# Patient Record
Sex: FEMALE | Race: WHITE | NOT HISPANIC OR LATINO | Employment: OTHER | URBAN - NONMETROPOLITAN AREA
[De-identification: names, ages, dates, MRNs, and addresses within clinical notes are randomized per-mention and may not be internally consistent; named-entity substitution may affect disease eponyms.]

---

## 2018-07-03 ENCOUNTER — HISTORICAL (OUTPATIENT)
Dept: ADMINISTRATIVE | Facility: HOSPITAL | Age: 73
End: 2018-07-03

## 2019-11-13 ENCOUNTER — HISTORICAL (OUTPATIENT)
Dept: ADMINISTRATIVE | Facility: HOSPITAL | Age: 74
End: 2019-11-13

## 2021-11-15 ENCOUNTER — HISTORICAL (OUTPATIENT)
Dept: ADMINISTRATIVE | Facility: HOSPITAL | Age: 76
End: 2021-11-15

## 2022-04-10 ENCOUNTER — HISTORICAL (OUTPATIENT)
Dept: ADMINISTRATIVE | Facility: HOSPITAL | Age: 77
End: 2022-04-10

## 2022-04-29 VITALS
SYSTOLIC BLOOD PRESSURE: 128 MMHG | BODY MASS INDEX: 21.56 KG/M2 | WEIGHT: 129.44 LBS | HEIGHT: 65 IN | DIASTOLIC BLOOD PRESSURE: 60 MMHG

## 2023-03-24 ENCOUNTER — HOSPITAL ENCOUNTER (OUTPATIENT)
Dept: RADIOLOGY | Facility: HOSPITAL | Age: 78
Discharge: HOME | End: 2023-03-24
Attending: FAMILY MEDICINE
Payer: MEDICARE

## 2023-03-24 DIAGNOSIS — M54.16 LUMBAR RADICULOPATHY: ICD-10-CM

## 2023-03-24 PROCEDURE — 72148 MRI LUMBAR SPINE W/O DYE: CPT | Mod: TC

## 2023-06-04 ENCOUNTER — HOSPITAL ENCOUNTER (INPATIENT)
Facility: HOSPITAL | Age: 78
LOS: 3 days | Discharge: NURSING, REHAB OR LONG-TERM CARE | DRG: 481 | End: 2023-06-08
Attending: STUDENT IN AN ORGANIZED HEALTH CARE EDUCATION/TRAINING PROGRAM | Admitting: INTERNAL MEDICINE
Payer: MEDICARE

## 2023-06-04 DIAGNOSIS — Z01.818 PRE-OP EXAM: ICD-10-CM

## 2023-06-04 DIAGNOSIS — S72.142A: Primary | ICD-10-CM
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
## 2023-06-04 DIAGNOSIS — Z01.818 PRE-OP EVALUATION: ICD-10-CM

## 2023-06-04 DIAGNOSIS — S72.8X2A OTHER FRACTURE OF LEFT FEMUR, INITIAL ENCOUNTER FOR CLOSED FRACTURE: ICD-10-CM

## 2023-06-04 PROCEDURE — 96374 THER/PROPH/DIAG INJ IV PUSH: CPT

## 2023-06-04 PROCEDURE — 99285 EMERGENCY DEPT VISIT HI MDM: CPT

## 2023-06-04 PROCEDURE — 63600175 PHARM REV CODE 636 W HCPCS: Performed by: NURSE PRACTITIONER

## 2023-06-04 PROCEDURE — 96375 TX/PRO/DX INJ NEW DRUG ADDON: CPT

## 2023-06-04 RX ORDER — HYDROMORPHONE HYDROCHLORIDE 2 MG/ML
1 INJECTION, SOLUTION INTRAMUSCULAR; INTRAVENOUS; SUBCUTANEOUS
Status: COMPLETED | OUTPATIENT
Start: 2023-06-04 | End: 2023-06-04

## 2023-06-04 RX ORDER — ONDANSETRON 2 MG/ML
4 INJECTION INTRAMUSCULAR; INTRAVENOUS
Status: COMPLETED | OUTPATIENT
Start: 2023-06-04 | End: 2023-06-04

## 2023-06-04 RX ADMIN — ONDANSETRON 4 MG: 2 INJECTION INTRAMUSCULAR; INTRAVENOUS at 11:06

## 2023-06-04 RX ADMIN — HYDROMORPHONE HYDROCHLORIDE 1 MG: 2 INJECTION INTRAMUSCULAR; INTRAVENOUS; SUBCUTANEOUS at 11:06

## 2023-06-05 ENCOUNTER — ANESTHESIA (OUTPATIENT)
Dept: SURGERY | Facility: HOSPITAL | Age: 78
DRG: 481 | End: 2023-06-05
Payer: MEDICARE

## 2023-06-05 ENCOUNTER — ANESTHESIA EVENT (OUTPATIENT)
Dept: SURGERY | Facility: HOSPITAL | Age: 78
DRG: 481 | End: 2023-06-05
Payer: MEDICARE

## 2023-06-05 PROBLEM — S72.8X2A OTHER FRACTURE OF LEFT FEMUR, INITIAL ENCOUNTER FOR CLOSED FRACTURE: Status: ACTIVE | Noted: 2023-06-05

## 2023-06-05 LAB
ABORH RETYPE: NORMAL
ALBUMIN SERPL-MCNC: 3.3 G/DL (ref 3.4–4.8)
ALBUMIN/GLOB SERPL: 1.4 RATIO (ref 1.1–2)
ALP SERPL-CCNC: 64 UNIT/L (ref 40–150)
ALT SERPL-CCNC: 16 UNIT/L (ref 0–55)
APTT PPP: 27.5 SECONDS (ref 23.2–33.7)
AST SERPL-CCNC: 12 UNIT/L (ref 5–34)
BASOPHILS # BLD AUTO: 0.04 X10(3)/MCL
BASOPHILS NFR BLD AUTO: 0.3 %
BILIRUBIN DIRECT+TOT PNL SERPL-MCNC: 0.3 MG/DL
BUN SERPL-MCNC: 18.3 MG/DL (ref 9.8–20.1)
CALCIUM SERPL-MCNC: 8.6 MG/DL (ref 8.4–10.2)
CHLORIDE SERPL-SCNC: 100 MMOL/L (ref 98–107)
CO2 SERPL-SCNC: 24 MMOL/L (ref 23–31)
CREAT SERPL-MCNC: 1.02 MG/DL (ref 0.55–1.02)
EOSINOPHIL # BLD AUTO: 0.1 X10(3)/MCL (ref 0–0.9)
EOSINOPHIL NFR BLD AUTO: 0.7 %
ERYTHROCYTE [DISTWIDTH] IN BLOOD BY AUTOMATED COUNT: 12.4 % (ref 11.5–17)
GFR SERPLBLD CREATININE-BSD FMLA CKD-EPI: 56 MLS/MIN/1.73/M2
GLOBULIN SER-MCNC: 2.4 GM/DL (ref 2.4–3.5)
GLUCOSE SERPL-MCNC: 240 MG/DL (ref 82–115)
GROUP & RH: NORMAL
HCT VFR BLD AUTO: 36.1 % (ref 37–47)
HGB BLD-MCNC: 12.3 G/DL (ref 12–16)
IMM GRANULOCYTES # BLD AUTO: 0.08 X10(3)/MCL (ref 0–0.04)
IMM GRANULOCYTES NFR BLD AUTO: 0.6 %
INDIRECT COOMBS GEL: NORMAL
INR BLD: 0.92 (ref 0–1.3)
LYMPHOCYTES # BLD AUTO: 2.5 X10(3)/MCL (ref 0.6–4.6)
LYMPHOCYTES NFR BLD AUTO: 18.4 %
MCH RBC QN AUTO: 31 PG (ref 27–31)
MCHC RBC AUTO-ENTMCNC: 34.1 G/DL (ref 33–36)
MCV RBC AUTO: 90.9 FL (ref 80–94)
MONOCYTES # BLD AUTO: 0.96 X10(3)/MCL (ref 0.1–1.3)
MONOCYTES NFR BLD AUTO: 7.1 %
NEUTROPHILS # BLD AUTO: 9.93 X10(3)/MCL (ref 2.1–9.2)
NEUTROPHILS NFR BLD AUTO: 72.9 %
NRBC BLD AUTO-RTO: 0 %
PLATELET # BLD AUTO: 270 X10(3)/MCL (ref 130–400)
PMV BLD AUTO: 8.4 FL (ref 7.4–10.4)
POCT GLUCOSE: 129 MG/DL (ref 70–110)
POCT GLUCOSE: 196 MG/DL (ref 70–110)
POCT GLUCOSE: 314 MG/DL (ref 70–110)
POTASSIUM SERPL-SCNC: 4.2 MMOL/L (ref 3.5–5.1)
PROT SERPL-MCNC: 5.7 GM/DL (ref 5.8–7.6)
PROTHROMBIN TIME: 12.3 SECONDS (ref 12.5–14.5)
RBC # BLD AUTO: 3.97 X10(6)/MCL (ref 4.2–5.4)
SODIUM SERPL-SCNC: 135 MMOL/L (ref 136–145)
SPECIMEN OUTDATE: NORMAL
WBC # SPEC AUTO: 13.61 X10(3)/MCL (ref 4.5–11.5)

## 2023-06-05 PROCEDURE — D9220A PRA ANESTHESIA: ICD-10-PCS | Mod: ANES,,, | Performed by: ANESTHESIOLOGY

## 2023-06-05 PROCEDURE — 80053 COMPREHEN METABOLIC PANEL: CPT | Performed by: NURSE PRACTITIONER

## 2023-06-05 PROCEDURE — 27245 PR OPEN FIX INTER/SUBTROCH FX,IMPLNT: ICD-10-PCS | Mod: LT,,, | Performed by: ORTHOPAEDIC SURGERY

## 2023-06-05 PROCEDURE — 25000003 PHARM REV CODE 250: Performed by: PHYSICIAN ASSISTANT

## 2023-06-05 PROCEDURE — 36000710: Performed by: ORTHOPAEDIC SURGERY

## 2023-06-05 PROCEDURE — 11000001 HC ACUTE MED/SURG PRIVATE ROOM

## 2023-06-05 PROCEDURE — D9220A PRA ANESTHESIA: ICD-10-PCS | Mod: CRNA,,, | Performed by: NURSE ANESTHETIST, CERTIFIED REGISTERED

## 2023-06-05 PROCEDURE — 85025 COMPLETE CBC W/AUTO DIFF WBC: CPT | Performed by: NURSE PRACTITIONER

## 2023-06-05 PROCEDURE — 25000003 PHARM REV CODE 250: Performed by: NURSE ANESTHETIST, CERTIFIED REGISTERED

## 2023-06-05 PROCEDURE — 27245 TREAT THIGH FRACTURE: CPT | Mod: AS,LT,, | Performed by: PHYSICIAN ASSISTANT

## 2023-06-05 PROCEDURE — 93005 ELECTROCARDIOGRAM TRACING: CPT

## 2023-06-05 PROCEDURE — D9220A PRA ANESTHESIA: Mod: CRNA,,, | Performed by: NURSE ANESTHETIST, CERTIFIED REGISTERED

## 2023-06-05 PROCEDURE — 63600175 PHARM REV CODE 636 W HCPCS: Performed by: INTERNAL MEDICINE

## 2023-06-05 PROCEDURE — 27201423 OPTIME MED/SURG SUP & DEVICES STERILE SUPPLY: Performed by: ORTHOPAEDIC SURGERY

## 2023-06-05 PROCEDURE — 85610 PROTHROMBIN TIME: CPT | Performed by: NURSE PRACTITIONER

## 2023-06-05 PROCEDURE — C1713 ANCHOR/SCREW BN/BN,TIS/BN: HCPCS | Performed by: ORTHOPAEDIC SURGERY

## 2023-06-05 PROCEDURE — 36000711: Performed by: ORTHOPAEDIC SURGERY

## 2023-06-05 PROCEDURE — 63600175 PHARM REV CODE 636 W HCPCS: Performed by: PHYSICIAN ASSISTANT

## 2023-06-05 PROCEDURE — 85730 THROMBOPLASTIN TIME PARTIAL: CPT | Performed by: NURSE PRACTITIONER

## 2023-06-05 PROCEDURE — 63600175 PHARM REV CODE 636 W HCPCS: Performed by: NURSE ANESTHETIST, CERTIFIED REGISTERED

## 2023-06-05 PROCEDURE — 27245 TREAT THIGH FRACTURE: CPT | Mod: LT,,, | Performed by: ORTHOPAEDIC SURGERY

## 2023-06-05 PROCEDURE — 63600175 PHARM REV CODE 636 W HCPCS: Performed by: NURSE PRACTITIONER

## 2023-06-05 PROCEDURE — 63600175 PHARM REV CODE 636 W HCPCS: Performed by: ANESTHESIOLOGY

## 2023-06-05 PROCEDURE — 71000033 HC RECOVERY, INTIAL HOUR: Performed by: ORTHOPAEDIC SURGERY

## 2023-06-05 PROCEDURE — 63600175 PHARM REV CODE 636 W HCPCS

## 2023-06-05 PROCEDURE — 27000221 HC OXYGEN, UP TO 24 HOURS

## 2023-06-05 PROCEDURE — 93010 EKG 12-LEAD: ICD-10-PCS | Mod: ,,, | Performed by: INTERNAL MEDICINE

## 2023-06-05 PROCEDURE — D9220A PRA ANESTHESIA: Mod: ANES,,, | Performed by: ANESTHESIOLOGY

## 2023-06-05 PROCEDURE — 37000009 HC ANESTHESIA EA ADD 15 MINS: Performed by: ORTHOPAEDIC SURGERY

## 2023-06-05 PROCEDURE — 86923 COMPATIBILITY TEST ELECTRIC: CPT | Performed by: INTERNAL MEDICINE

## 2023-06-05 PROCEDURE — 27245 PR OPEN FIX INTER/SUBTROCH FX,IMPLNT: ICD-10-PCS | Mod: AS,LT,, | Performed by: PHYSICIAN ASSISTANT

## 2023-06-05 PROCEDURE — 25000003 PHARM REV CODE 250: Performed by: INTERNAL MEDICINE

## 2023-06-05 PROCEDURE — 86900 BLOOD TYPING SEROLOGIC ABO: CPT | Performed by: ORTHOPAEDIC SURGERY

## 2023-06-05 PROCEDURE — 99223 PR INITIAL HOSPITAL CARE,LEVL III: ICD-10-PCS | Mod: 57,,, | Performed by: ORTHOPAEDIC SURGERY

## 2023-06-05 PROCEDURE — 25000003 PHARM REV CODE 250

## 2023-06-05 PROCEDURE — 93010 ELECTROCARDIOGRAM REPORT: CPT | Mod: ,,, | Performed by: INTERNAL MEDICINE

## 2023-06-05 PROCEDURE — C1769 GUIDE WIRE: HCPCS | Performed by: ORTHOPAEDIC SURGERY

## 2023-06-05 PROCEDURE — 63600175 PHARM REV CODE 636 W HCPCS: Performed by: ORTHOPAEDIC SURGERY

## 2023-06-05 PROCEDURE — 99223 1ST HOSP IP/OBS HIGH 75: CPT | Mod: 57,,, | Performed by: ORTHOPAEDIC SURGERY

## 2023-06-05 PROCEDURE — 37000008 HC ANESTHESIA 1ST 15 MINUTES: Performed by: ORTHOPAEDIC SURGERY

## 2023-06-05 DEVICE — SCREW LOK XL25 5X44MM: Type: IMPLANTABLE DEVICE | Site: HIP | Status: FUNCTIONAL

## 2023-06-05 DEVICE — SCREW XL25 IM NAIL 40X5MM: Type: IMPLANTABLE DEVICE | Site: HIP | Status: FUNCTIONAL

## 2023-06-05 RX ORDER — PIOGLITAZONEHYDROCHLORIDE 15 MG/1
15 TABLET ORAL NIGHTLY
COMMUNITY
Start: 2023-04-20

## 2023-06-05 RX ORDER — HYDROCHLOROTHIAZIDE 12.5 MG/1
12.5 TABLET ORAL DAILY
Status: DISCONTINUED | OUTPATIENT
Start: 2023-06-05 | End: 2023-06-07

## 2023-06-05 RX ORDER — PHENYLEPHRINE HCL IN 0.9% NACL 1 MG/10 ML
SYRINGE (ML) INTRAVENOUS
Status: DISCONTINUED | OUTPATIENT
Start: 2023-06-05 | End: 2023-06-05

## 2023-06-05 RX ORDER — HYDROMORPHONE HYDROCHLORIDE 2 MG/ML
1 INJECTION, SOLUTION INTRAMUSCULAR; INTRAVENOUS; SUBCUTANEOUS
Status: COMPLETED | OUTPATIENT
Start: 2023-06-05 | End: 2023-06-05

## 2023-06-05 RX ORDER — HYDROMORPHONE HYDROCHLORIDE 2 MG/ML
1 INJECTION, SOLUTION INTRAMUSCULAR; INTRAVENOUS; SUBCUTANEOUS EVERY 4 HOURS PRN
Status: DISCONTINUED | OUTPATIENT
Start: 2023-06-05 | End: 2023-06-05

## 2023-06-05 RX ORDER — ACETAMINOPHEN 325 MG/1
650 TABLET ORAL EVERY 4 HOURS PRN
Status: DISCONTINUED | OUTPATIENT
Start: 2023-06-05 | End: 2023-06-07

## 2023-06-05 RX ORDER — ACETAMINOPHEN 325 MG/1
650 TABLET ORAL EVERY 4 HOURS PRN
Status: DISCONTINUED | OUTPATIENT
Start: 2023-06-05 | End: 2023-06-08 | Stop reason: HOSPADM

## 2023-06-05 RX ORDER — METOPROLOL SUCCINATE 25 MG/1
25 TABLET, EXTENDED RELEASE ORAL DAILY
Status: DISCONTINUED | OUTPATIENT
Start: 2023-06-05 | End: 2023-06-08 | Stop reason: HOSPADM

## 2023-06-05 RX ORDER — FENTANYL CITRATE 50 UG/ML
INJECTION, SOLUTION INTRAMUSCULAR; INTRAVENOUS
Status: DISCONTINUED | OUTPATIENT
Start: 2023-06-05 | End: 2023-06-05

## 2023-06-05 RX ORDER — MAG HYDROX/ALUMINUM HYD/SIMETH 200-200-20
30 SUSPENSION, ORAL (FINAL DOSE FORM) ORAL EVERY 4 HOURS PRN
Status: DISCONTINUED | OUTPATIENT
Start: 2023-06-05 | End: 2023-06-08 | Stop reason: HOSPADM

## 2023-06-05 RX ORDER — DOCUSATE SODIUM 100 MG/1
100 CAPSULE, LIQUID FILLED ORAL DAILY
Status: DISCONTINUED | OUTPATIENT
Start: 2023-06-05 | End: 2023-06-08

## 2023-06-05 RX ORDER — HYDROMORPHONE HYDROCHLORIDE 2 MG/ML
0.2 INJECTION, SOLUTION INTRAMUSCULAR; INTRAVENOUS; SUBCUTANEOUS EVERY 5 MIN PRN
Status: DISCONTINUED | OUTPATIENT
Start: 2023-06-05 | End: 2023-06-05 | Stop reason: HOSPADM

## 2023-06-05 RX ORDER — VANCOMYCIN HYDROCHLORIDE 1 G/20ML
INJECTION, POWDER, LYOPHILIZED, FOR SOLUTION INTRAVENOUS
Status: DISCONTINUED | OUTPATIENT
Start: 2023-06-05 | End: 2023-06-05 | Stop reason: HOSPADM

## 2023-06-05 RX ORDER — LOSARTAN POTASSIUM AND HYDROCHLOROTHIAZIDE 12.5; 1 MG/1; MG/1
1 TABLET ORAL DAILY
Status: DISCONTINUED | OUTPATIENT
Start: 2023-06-05 | End: 2023-06-05

## 2023-06-05 RX ORDER — ISOSORBIDE MONONITRATE 30 MG/1
30 TABLET, EXTENDED RELEASE ORAL DAILY
Status: DISCONTINUED | OUTPATIENT
Start: 2023-06-05 | End: 2023-06-08 | Stop reason: HOSPADM

## 2023-06-05 RX ORDER — AMITRIPTYLINE HYDROCHLORIDE 25 MG/1
25 TABLET, FILM COATED ORAL NIGHTLY
Status: DISCONTINUED | OUTPATIENT
Start: 2023-06-05 | End: 2023-06-08 | Stop reason: HOSPADM

## 2023-06-05 RX ORDER — INSULIN ASPART 100 [IU]/ML
1-10 INJECTION, SOLUTION INTRAVENOUS; SUBCUTANEOUS
Status: DISCONTINUED | OUTPATIENT
Start: 2023-06-05 | End: 2023-06-08 | Stop reason: HOSPADM

## 2023-06-05 RX ORDER — LEVOTHYROXINE SODIUM 88 UG/1
88 TABLET ORAL EVERY MORNING
Status: DISCONTINUED | OUTPATIENT
Start: 2023-06-05 | End: 2023-06-08 | Stop reason: HOSPADM

## 2023-06-05 RX ORDER — GLIMEPIRIDE 2 MG/1
2 TABLET ORAL 2 TIMES DAILY
COMMUNITY
Start: 2023-05-08

## 2023-06-05 RX ORDER — SODIUM CHLORIDE, SODIUM LACTATE, POTASSIUM CHLORIDE, CALCIUM CHLORIDE 600; 310; 30; 20 MG/100ML; MG/100ML; MG/100ML; MG/100ML
INJECTION, SOLUTION INTRAVENOUS CONTINUOUS
Status: DISCONTINUED | OUTPATIENT
Start: 2023-06-05 | End: 2023-06-06

## 2023-06-05 RX ORDER — SODIUM CHLORIDE 0.9 % (FLUSH) 0.9 %
10 SYRINGE (ML) INJECTION
Status: DISCONTINUED | OUTPATIENT
Start: 2023-06-05 | End: 2023-06-05 | Stop reason: HOSPADM

## 2023-06-05 RX ORDER — AMITRIPTYLINE HYDROCHLORIDE 25 MG/1
25 TABLET, FILM COATED ORAL
Status: ON HOLD | COMMUNITY
Start: 2023-05-02 | End: 2023-06-08 | Stop reason: SDUPTHER

## 2023-06-05 RX ORDER — MORPHINE SULFATE 4 MG/ML
2 INJECTION, SOLUTION INTRAMUSCULAR; INTRAVENOUS EVERY 4 HOURS PRN
Status: DISCONTINUED | OUTPATIENT
Start: 2023-06-05 | End: 2023-06-05

## 2023-06-05 RX ORDER — LOSARTAN POTASSIUM AND HYDROCHLOROTHIAZIDE 12.5; 1 MG/1; MG/1
1 TABLET ORAL
COMMUNITY
Start: 2023-06-02

## 2023-06-05 RX ORDER — OXYCODONE HYDROCHLORIDE 5 MG/1
5 TABLET ORAL EVERY 6 HOURS PRN
Status: DISCONTINUED | OUTPATIENT
Start: 2023-06-05 | End: 2023-06-08 | Stop reason: HOSPADM

## 2023-06-05 RX ORDER — HYDROMORPHONE HYDROCHLORIDE 2 MG/ML
INJECTION, SOLUTION INTRAMUSCULAR; INTRAVENOUS; SUBCUTANEOUS
Status: COMPLETED
Start: 2023-06-05 | End: 2023-06-05

## 2023-06-05 RX ORDER — PROPOFOL 10 MG/ML
VIAL (ML) INTRAVENOUS
Status: DISCONTINUED | OUTPATIENT
Start: 2023-06-05 | End: 2023-06-05

## 2023-06-05 RX ORDER — ONDANSETRON HYDROCHLORIDE 2 MG/ML
INJECTION, SOLUTION INTRAMUSCULAR; INTRAVENOUS
Status: DISCONTINUED | OUTPATIENT
Start: 2023-06-05 | End: 2023-06-05

## 2023-06-05 RX ORDER — METHOCARBAMOL 500 MG/1
500 TABLET, FILM COATED ORAL 3 TIMES DAILY
Status: DISCONTINUED | OUTPATIENT
Start: 2023-06-05 | End: 2023-06-08 | Stop reason: HOSPADM

## 2023-06-05 RX ORDER — ISOSORBIDE MONONITRATE 30 MG/1
TABLET, EXTENDED RELEASE ORAL
Status: ON HOLD | COMMUNITY
Start: 2023-04-20 | End: 2023-06-08 | Stop reason: SDUPTHER

## 2023-06-05 RX ORDER — ONDANSETRON 2 MG/ML
4 INJECTION INTRAMUSCULAR; INTRAVENOUS EVERY 4 HOURS PRN
Status: DISCONTINUED | OUTPATIENT
Start: 2023-06-05 | End: 2023-06-08 | Stop reason: HOSPADM

## 2023-06-05 RX ORDER — METFORMIN HYDROCHLORIDE 500 MG/1
1000 TABLET, EXTENDED RELEASE ORAL 2 TIMES DAILY
COMMUNITY
Start: 2023-04-20

## 2023-06-05 RX ORDER — METHOCARBAMOL 100 MG/ML
1000 INJECTION, SOLUTION INTRAMUSCULAR; INTRAVENOUS
Status: COMPLETED | OUTPATIENT
Start: 2023-06-05 | End: 2023-06-05

## 2023-06-05 RX ORDER — ENOXAPARIN SODIUM 100 MG/ML
40 INJECTION SUBCUTANEOUS EVERY 24 HOURS
Status: DISCONTINUED | OUTPATIENT
Start: 2023-06-05 | End: 2023-06-08 | Stop reason: HOSPADM

## 2023-06-05 RX ORDER — SODIUM CHLORIDE 0.9 % (FLUSH) 0.9 %
10 SYRINGE (ML) INJECTION
Status: DISCONTINUED | OUTPATIENT
Start: 2023-06-05 | End: 2023-06-08 | Stop reason: HOSPADM

## 2023-06-05 RX ORDER — LEVOTHYROXINE SODIUM 88 UG/1
88 TABLET ORAL EVERY MORNING
COMMUNITY
Start: 2023-05-06

## 2023-06-05 RX ORDER — LIDOCAINE HYDROCHLORIDE 20 MG/ML
INJECTION, SOLUTION EPIDURAL; INFILTRATION; INTRACAUDAL; PERINEURAL
Status: DISCONTINUED | OUTPATIENT
Start: 2023-06-05 | End: 2023-06-05

## 2023-06-05 RX ORDER — MORPHINE SULFATE 4 MG/ML
4 INJECTION, SOLUTION INTRAMUSCULAR; INTRAVENOUS EVERY 6 HOURS PRN
Status: DISCONTINUED | OUTPATIENT
Start: 2023-06-05 | End: 2023-06-08 | Stop reason: HOSPADM

## 2023-06-05 RX ORDER — PROCHLORPERAZINE EDISYLATE 5 MG/ML
5 INJECTION INTRAMUSCULAR; INTRAVENOUS EVERY 6 HOURS PRN
Status: DISCONTINUED | OUTPATIENT
Start: 2023-06-05 | End: 2023-06-08 | Stop reason: HOSPADM

## 2023-06-05 RX ORDER — OXYCODONE HYDROCHLORIDE 10 MG/1
10 TABLET ORAL EVERY 4 HOURS PRN
Status: DISCONTINUED | OUTPATIENT
Start: 2023-06-05 | End: 2023-06-08 | Stop reason: HOSPADM

## 2023-06-05 RX ORDER — ROCURONIUM BROMIDE 10 MG/ML
INJECTION, SOLUTION INTRAVENOUS
Status: DISCONTINUED | OUTPATIENT
Start: 2023-06-05 | End: 2023-06-05

## 2023-06-05 RX ORDER — PRAVASTATIN SODIUM 40 MG/1
TABLET ORAL
Status: ON HOLD | COMMUNITY
Start: 2023-04-20 | End: 2023-06-08 | Stop reason: SDUPTHER

## 2023-06-05 RX ORDER — PRAVASTATIN SODIUM 40 MG/1
40 TABLET ORAL NIGHTLY
Status: DISCONTINUED | OUTPATIENT
Start: 2023-06-05 | End: 2023-06-08 | Stop reason: HOSPADM

## 2023-06-05 RX ORDER — CEFAZOLIN SODIUM 2 G/50ML
2 SOLUTION INTRAVENOUS
Status: COMPLETED | OUTPATIENT
Start: 2023-06-05 | End: 2023-06-06

## 2023-06-05 RX ORDER — LOSARTAN POTASSIUM 50 MG/1
100 TABLET ORAL DAILY
Status: DISCONTINUED | OUTPATIENT
Start: 2023-06-05 | End: 2023-06-07

## 2023-06-05 RX ORDER — METOPROLOL SUCCINATE 25 MG/1
25 TABLET, EXTENDED RELEASE ORAL
Status: ON HOLD | COMMUNITY
Start: 2023-05-09 | End: 2023-06-08 | Stop reason: SDUPTHER

## 2023-06-05 RX ADMIN — ROCURONIUM BROMIDE 5 MG: 10 SOLUTION INTRAVENOUS at 01:06

## 2023-06-05 RX ADMIN — OXYCODONE HYDROCHLORIDE 10 MG: 10 TABLET ORAL at 09:06

## 2023-06-05 RX ADMIN — Medication 100 MCG: at 02:06

## 2023-06-05 RX ADMIN — CEFAZOLIN SODIUM 2 G: 2 SOLUTION INTRAVENOUS at 02:06

## 2023-06-05 RX ADMIN — ONDANSETRON 4 MG: 2 INJECTION INTRAMUSCULAR; INTRAVENOUS at 06:06

## 2023-06-05 RX ADMIN — INSULIN ASPART 4 UNITS: 100 INJECTION, SOLUTION INTRAVENOUS; SUBCUTANEOUS at 10:06

## 2023-06-05 RX ADMIN — ROCURONIUM BROMIDE 45 MG: 10 SOLUTION INTRAVENOUS at 01:06

## 2023-06-05 RX ADMIN — Medication 150 MCG: at 02:06

## 2023-06-05 RX ADMIN — OXYCODONE HYDROCHLORIDE 10 MG: 10 TABLET ORAL at 04:06

## 2023-06-05 RX ADMIN — HYDROMORPHONE HYDROCHLORIDE 0.2 MG: 2 INJECTION INTRAMUSCULAR; INTRAVENOUS; SUBCUTANEOUS at 03:06

## 2023-06-05 RX ADMIN — PRAVASTATIN SODIUM 40 MG: 40 TABLET ORAL at 09:06

## 2023-06-05 RX ADMIN — SODIUM CHLORIDE, SODIUM GLUCONATE, SODIUM ACETATE, POTASSIUM CHLORIDE AND MAGNESIUM CHLORIDE: 526; 502; 368; 37; 30 INJECTION, SOLUTION INTRAVENOUS at 01:06

## 2023-06-05 RX ADMIN — FENTANYL CITRATE 50 MCG: 50 INJECTION, SOLUTION INTRAMUSCULAR; INTRAVENOUS at 01:06

## 2023-06-05 RX ADMIN — SODIUM CHLORIDE, POTASSIUM CHLORIDE, SODIUM LACTATE AND CALCIUM CHLORIDE: 600; 310; 30; 20 INJECTION, SOLUTION INTRAVENOUS at 06:06

## 2023-06-05 RX ADMIN — CEFAZOLIN SODIUM 2 G: 2 SOLUTION INTRAVENOUS at 09:06

## 2023-06-05 RX ADMIN — SUGAMMADEX 100 MG: 100 INJECTION, SOLUTION INTRAVENOUS at 03:06

## 2023-06-05 RX ADMIN — ONDANSETRON HYDROCHLORIDE 4 MG: 2 INJECTION, SOLUTION INTRAMUSCULAR; INTRAVENOUS at 02:06

## 2023-06-05 RX ADMIN — SODIUM CHLORIDE, POTASSIUM CHLORIDE, SODIUM LACTATE AND CALCIUM CHLORIDE: 600; 310; 30; 20 INJECTION, SOLUTION INTRAVENOUS at 07:06

## 2023-06-05 RX ADMIN — MORPHINE SULFATE 4 MG: 4 INJECTION, SOLUTION INTRAMUSCULAR; INTRAVENOUS at 01:06

## 2023-06-05 RX ADMIN — PROPOFOL 120 MG: 10 INJECTION, EMULSION INTRAVENOUS at 01:06

## 2023-06-05 RX ADMIN — HYDROMORPHONE HYDROCHLORIDE 1 MG: 2 INJECTION INTRAMUSCULAR; INTRAVENOUS; SUBCUTANEOUS at 01:06

## 2023-06-05 RX ADMIN — HYDROMORPHONE HYDROCHLORIDE 1 MG: 2 INJECTION, SOLUTION INTRAMUSCULAR; INTRAVENOUS; SUBCUTANEOUS at 08:06

## 2023-06-05 RX ADMIN — ENOXAPARIN SODIUM 40 MG: 40 INJECTION SUBCUTANEOUS at 04:06

## 2023-06-05 RX ADMIN — LIDOCAINE HYDROCHLORIDE 80 MG: 20 INJECTION, SOLUTION EPIDURAL; INFILTRATION; INTRACAUDAL; PERINEURAL at 01:06

## 2023-06-05 RX ADMIN — AMITRIPTYLINE HYDROCHLORIDE 25 MG: 25 TABLET, FILM COATED ORAL at 09:06

## 2023-06-05 RX ADMIN — METHOCARBAMOL 1000 MG: 100 INJECTION INTRAMUSCULAR; INTRAVENOUS at 01:06

## 2023-06-05 RX ADMIN — MORPHINE SULFATE 2 MG: 4 INJECTION INTRAVENOUS at 06:06

## 2023-06-05 RX ADMIN — METHOCARBAMOL 500 MG: 500 TABLET ORAL at 09:06

## 2023-06-05 NOTE — TRANSFER OF CARE
"Anesthesia Transfer of Care Note    Patient: Vickie Rahman    Procedure(s) Performed: Procedure(s) (LRB):  INSERTION, INTRAMEDULLARY JAMAL, FEMUR (Left)    Patient location: PACU    Anesthesia Type: general    Transport from OR: Transported from OR on room air with adequate spontaneous ventilation    Post pain: adequate analgesia    Post assessment: no apparent anesthetic complications    Post vital signs: stable    Level of consciousness: responds to stimulation and awake    Nausea/Vomiting: no nausea/vomiting    Complications: none    Transfer of care protocol was followed      Last vitals:   Visit Vitals  BP (!) 160/84   Pulse 84   Temp 36.5 °C (97.7 °F) (Oral)   Resp 18   Ht 5' 6" (1.676 m)   Wt 59 kg (130 lb)   SpO2 (!) 93%   BMI 20.98 kg/m²     "

## 2023-06-05 NOTE — ED PROVIDER NOTES
Encounter Date: 6/4/2023       History     Chief Complaint   Patient presents with    Fall     Pt fell down 3 steps, -LOC, c/o left hip pain, no shortening or rotation noted. +PMS. No other injuries or pain noted. No bleeding noted.      See MDM    The history is provided by the patient. No  was used.   Review of patient's allergies indicates:  No Known Allergies  Past Medical History:   Diagnosis Date    Coronary artery disease     Diabetes mellitus     Hypercholesteremia     Hypertension      Past Surgical History:   Procedure Laterality Date    BLADDER SURGERY      cardiac stents      EXCISIONAL HEMORRHOIDECTOMY      SHOULDER SURGERY       History reviewed. No pertinent family history.  Social History     Tobacco Use    Smoking status: Never    Smokeless tobacco: Never   Substance Use Topics    Alcohol use: Not Currently    Drug use: Not Currently     Review of Systems   Constitutional:  Negative for fever.   Respiratory:  Negative for cough and shortness of breath.    Cardiovascular:  Negative for chest pain.   Gastrointestinal:  Negative for abdominal pain.   Genitourinary:  Negative for difficulty urinating and dysuria.   Musculoskeletal:  Negative for gait problem.   Skin:  Negative for color change.   Neurological:  Negative for dizziness, speech difficulty and headaches.   Psychiatric/Behavioral:  Negative for hallucinations and suicidal ideas.    All other systems reviewed and are negative.    Physical Exam     Initial Vitals [06/04/23 2254]   BP Pulse Resp Temp SpO2   (!) 168/65 90 16 98.2 °F (36.8 °C) 96 %      MAP       --         Physical Exam    Nursing note and vitals reviewed.  Constitutional: She appears well-developed and well-nourished.   HENT:   Head: Normocephalic.   Eyes: EOM are normal.   Neck:   Normal range of motion.  Cardiovascular:  Normal rate, regular rhythm, normal heart sounds and intact distal pulses.           Pulmonary/Chest: Breath sounds normal. No  respiratory distress.   Abdominal: Abdomen is soft. Bowel sounds are normal. There is no abdominal tenderness.   Musculoskeletal:      Cervical back: Normal range of motion.      Comments: Tenderness left hip     Neurological: She is alert and oriented to person, place, and time.   Skin: Skin is warm and dry.   Psychiatric: She has a normal mood and affect. Her behavior is normal. Judgment and thought content normal.       ED Course   Procedures  Labs Reviewed   APTT   CBC W/ AUTO DIFFERENTIAL    Narrative:     The following orders were created for panel order CBC auto differential.  Procedure                               Abnormality         Status                     ---------                               -----------         ------                     CBC with Differential[[id number]]                                                         Please view results for these tests on the individual orders.   COMPREHENSIVE METABOLIC PANEL   PROTIME-INR   CBC WITH DIFFERENTIAL          Imaging Results              X-Ray Chest AP Portable (In process)                      X-Ray Hip 2 or 3 views Left (with Pelvis when performed) (In process)                      Medications   HYDROmorphone (PF) injection 1 mg (1 mg Intravenous Given 6/4/23 2331)   ondansetron injection 4 mg (4 mg Intravenous Given 6/4/23 2331)     Medical Decision Making:   Initial Assessment:   Historian:  Patient.  Patient is a 78-year-old female  that presents with fall that has been present tonight. Associated symptoms left hip pain. Surrounding information is nothing. Exacerbated by movement. Relieved by nothing. Patient treatment prior to arrival none. Risk factors include none. Other history pertaining to this complaint nothing.   Assessment:  See physical exam.    Differential Diagnosis:   Hip contusion, hip fracture, pelvic fracture, femur fracture  ED Management:  History was obtained.  Physical performed.  X-ray does show a proximal femur  fracture.  I did speak to orthopedic surgeon Dr. Vogt.  He would like patient to be NPO.  He was see patient in the morning.  He stated they could be admitted to Hospital Medicine.  Hospital Medicine was paged.  Spoke to Sadia REY with Hospital Medicine and they accept admission.  I did speak to the emergency room physician Dr. Oliver Corral.  He agrees with admission and will perform a face-to-face interaction with patient.  No social determinants that affect healthcare were noted.                        Clinical Impression:   Final diagnoses:  [W19.XXXA] Fall  [Z01.818] Pre-op exam        ED Disposition Condition    Admit Stable                Hardik Tai, ZANDRA  06/05/23 0056

## 2023-06-05 NOTE — OP NOTE
OPERATIVE REPORT    Patient: Vickie Rahman   : 1945    MRN: [id number]  Date: 2023      Surgeon:Ace Del Cid DO  Assistant: Tyrone Butterfield was essential, part of the procedure including deep hardware placement and deep closure.  No senior assistant was availible  Preoperative Diagnosis: : Closed left intertrochanteric femur fracture  Postoperative Diagnosis: Same  Procedure:  Treatment left intertrochanteric femur fracture with intramedullary nail CPT 17375  Anesthesiologist: No responsible provider has been recorded for the case.  OR Staff: Circulator: Edwin Finney RN; Phuong Keenan RN  Physician Assistant: Che Butterfield PA-C  Scrub Person: Divya Herbert  X-Ray Technologist: RT Cindy  Implants:   Implant Name Type Inv. Item Serial No.  Lot No. LRB No. Used Action   TI LAWRENCE TFNA     [id number] Left 1 Implanted   SCREW XL25 IM NAIL 40X5MM - [id number]  SCREW XL25 IM NAIL 40X5MM  [employer] [id number] Left 1 Implanted   SCREW HOLLIS XL25 5X44MM - [id number]  SCREW HOLLIS XL25 5X44MM  [employer] [id number] Left 1 Implanted     EBL: 100cc  Complications: None  Disposition: To PACU, stable    Indications: Vickie Rahman is a 78 y.o. female presenting with the aforementioned injuries. The procedure is indicated to best obtain and maintain stability of the femur while allowing early ROM.  The patient is awake and alert. After thorough discussion of the risks, benefits, expected outcomes, and alternatives to surgical intervention, the patient agreed to proceed with surgical treatment.  Specific risks discussed included, but were not limited to: superficial or deep infection, wound healing complications, DVT/PE, significant bleeding requiring transfusion, damage to named anatomic structures in the immediate area including named neurovascular structures, implant failure, and general risks of anesthesia.  The patient voiced understanding and written as well as verbal consent was  obtained by myself prior to the procedure.    Procedure in Detail:  The patient's left lower extremity was marked by me in the preoperative area.  She was taken to the operating room, and after satisfactory induction of anesthesia, the patient was placed in the supine position with a small bump under the ipsilateral hip.   The ipsilateral lower extremity was then sterilely prepped and draped in the usual sterile fashion.  Standard time out procedure was performed confirming the correct surgeon, site, side, patient ID, procedure, and administration of antibiotics.       A 3 cm longitudinal incision was made just proximal to the greater trochanter.  The subcutaneous tissue and gluteal fascia were incised.  A threaded guide pin was then placed in the tip of the greater trochanter and extended and introduced into the proximal femur under AP and lateral fluoroscopy.  The Synthes one-step reamer was then used to ream proximally. A ball-tipped guide phani was then placed through the entry site down to the physeal scar of the femur.  This was measured and then was reamed .  A Synthes TFN  appropiate size  was then passed over the guidewire, which was subsequently removed.  The proximal interlocking device was then placed and a 2-cm longitudinal incision was made over the lateral aspect of the proximal thigh and the fascia obinna was incised.  A threaded guide pin was then placed through the lateral cortex of the femur through the femoral neck and into the femoral head in the center-center position.  A helical blade was then placed under fluoroscopy and satisfactory position was noted on AP and lateral fluoroscopy and the setscrew was then set. The proximal interlocking device was then removed.    Distal interlocking screws were done with  two single lateral to medial interlocking screw under fluoroscopic guidance.  All wounds were then thoroughly irrigated.  Subcutaneous tissues were closed with interrupted inverted of 2-0  Monocryl.  Skin was approximated using skin staples.  Sterile dressing was applied.  General anesthesia was reversed and she was returned to the Postanesthesia Care Unit in stable condition.      All sponge and needle counts were correct at the end of the case.  I was present and participated in all aspects of the procedure.    Prognosis:  The patient will be kept WBAT on the ipsilateral extremity.  DVT prophylaxis is indicated for this patient and procedure.  The patient is at risk of pain, infection, and nonunion, and we will watch for all of these, amongst other issues, as the patient continues to heal.      This note/OR report was created with the assistance of  voice recognition software or phone  dictation.  There may be transcription errors as a result of using this technology however minimal. Effort has been made to assure accuracy of transcription but any obvious errors or omissions should be clarified with the author of the document.       Ace Del Cid, DO  Orthopedic Trauma Surgery

## 2023-06-05 NOTE — ANESTHESIA PROCEDURE NOTES
Intubation    Date/Time: 6/5/2023 1:58 PM  Performed by: Mckenzie Mo CRNA  Authorized by: Yann Holguin DO     Intubation:     Induction:  Intravenous    Intubated:  Postinduction    Mask Ventilation:  Easy mask    Attempts:  1    Attempted By:  CRNA    Method of Intubation:  Direct    Blade:  Stark 2    Laryngeal View Grade: Grade I - full view of cords      Difficult Airway Encountered?: No      Complications:  None    Airway Device:  Oral endotracheal tube    Airway Device Size:  7.0    Style/Cuff Inflation:  Cuffed (inflated to minimal occlusive pressure)    Tube secured:  22    Secured at:  The lips    Placement Verified By:  Capnometry    Complicating Factors:  None    Findings Post-Intubation:  BS equal bilateral and atraumatic/condition of teeth unchanged

## 2023-06-05 NOTE — ANESTHESIA PREPROCEDURE EVALUATION
06/05/2023  Vickie Rahman is a 78 y.o., female w/ history of CAD/stent 7 years ago, diabetes, hypertension and hyperlipidemia and CKD 3A present to the ED after a mechanical ground level fall with complaint of left hip pain.  Imaging revealed left intertrochanteric femur fracture, labs notable for reactive leukocytosis otherwise unremarkable.    .  Other Medical History   Coronary artery disease Diabetes mellitus   Hypertension Hypercholesteremia     Surgical History    cardiac stents BLADDER SURGERY   EXCISIONAL HEMORRHOIDECTOMY SHOULDER SURGERY         Pre-op Assessment    I have reviewed the Patient Summary Reports.     I have reviewed the Nursing Notes. I have reviewed the NPO Status.   I have reviewed the Medications.     Review of Systems  Anesthesia Hx:  No problems with previous Anesthesia    Social:  Non-Smoker    Cardiovascular:   Hypertension CAD  CABG/stent  ECG has been reviewed.    Endocrine:   Diabetes        Physical Exam  General: Well nourished, Cooperative, Alert and Oriented    Airway:  Mallampati: II   Mouth Opening: Normal  TM Distance: Normal  Neck ROM: Normal ROM    Dental:  Edentulous    Chest/Lungs:  Clear to auscultation, Normal Respiratory Rate    Heart:  Rate: Normal  Rhythm: Regular Rhythm  Sounds: Normal    Abdomen:  Normal, Soft, Nontender        Anesthesia Plan  Type of Anesthesia, risks & benefits discussed:    Anesthesia Type: Gen ETT  Intra-op Monitoring Plan: Standard ASA Monitors  Post Op Pain Control Plan: multimodal analgesia  Induction:  IV  Airway Plan: Direct  Informed Consent: Informed consent signed with the Patient and all parties understand the risks and agree with anesthesia plan.  All questions answered.   ASA Score: 3  Day of Surgery Review of History & Physical: H&P Update referred to the surgeon/provider.    Ready For Surgery From Anesthesia Perspective.      .

## 2023-06-05 NOTE — ANESTHESIA POSTPROCEDURE EVALUATION
Anesthesia Post Evaluation    Patient: Vickie Rahman    Procedure(s) Performed: Procedure(s) (LRB):  INSERTION, INTRAMEDULLARY JAMAL, FEMUR (Left)    Final Anesthesia Type: general      Patient location during evaluation: PACU  Patient participation: Yes- Able to Participate  Level of consciousness: awake and alert  Post-procedure vital signs: reviewed and stable  Pain management: adequate  Airway patency: patent  SOMMER mitigation strategies: Multimodal analgesia  PONV status at discharge: No PONV  Anesthetic complications: no      Cardiovascular status: blood pressure returned to baseline and hemodynamically stable  Respiratory status: unassisted  Hydration status: euvolemic  Follow-up not needed.          Vitals Value Taken Time   /55 06/05/23 1542   Temp  06/05/23 1546   Pulse 82 06/05/23 1545   Resp 21 06/05/23 1545   SpO2 98 % 06/05/23 1545   Vitals shown include unvalidated device data.      No case tracking events are documented in the log.      Pain/Carri Score: Pain Rating Prior to Med Admin: 8 (6/5/2023  1:16 PM)

## 2023-06-05 NOTE — H&P
[facility] Hospital Medicine   History & Physical Note      Patient Name: Vickie Rahman  : 1945  MRN: [id number]  PCP: Mau Dominique Iii, MD  Admitting Service: Hospital Medicine  Attending Physician: Taty Cuellar MD  Admission Date: 2023 - IP- Inpatient   Length of Stay: 0  History source: EMR, patient and/or patient's family  Code status: Full    Chief Complaint   Fall (Pt fell down 3 steps, -LOC, c/o left hip pain, no shortening or rotation noted. +PMS. No other injuries or pain noted. No bleeding noted. )      History of Present Illness   Mrs. Rahman is a 79 yo female with pmhx HTN, HLD, DM2, CAD/stent 7 yrs ago on ASA 81mg daily who presents to the ED with c/o left hip pain after sustaining a ground level fall, pt states she was walking up the stairs to her house when she tripped and felling landing on her left hip on the cement, no head trauma or LOC. She reports immediate pain to the area. At baseline, she ambulates independently and is independent of IADLs. She denies ROBERTO, CP, and is able to walk long distances and do simple house chores without getting SOB.     She was hemodynamically stable upon arrival, XR showed a left proximal femur fracture. Dr. Vogt was consulted and plans to take pt to OR today. Pt admitted to Hospitalist due to multiple medical comrobidities.         ROS   Except as documented, all other systems reviewed and negative    Past Medical History   Hypothyroidism  DM2  HTN  CAD/stent 7 yrs ago  HLD  CKD Stage IIIA    Past Surgical History     Past Surgical History:   Procedure Laterality Date    BLADDER SURGERY      cardiac stents      EXCISIONAL HEMORRHOIDECTOMY      SHOULDER SURGERY         Social History     Social History     Tobacco Use    Smoking status: Never    Smokeless tobacco: Never   Substance Use Topics    Alcohol use: Not Currently        Family History   Reviewed and negative    Allergies   Patient has no known allergies.    Home  "Medications     Prior to Admission medications    Not on File          Physical Exam   Vital Signs  Temp:  [97.7 °F (36.5 °C)-98.2 °F (36.8 °C)]   Pulse:  [83-90]   Resp:  [16-21]   BP: (106-174)/(58-88)   SpO2:  [95 %-97 %]    General: Appears comfortable  HEENT: NC/AT  Neck:  No JVD  Chest: CTABL  CVS: Regular rhythm. Normal S1/S2.  Abdomen: nondistended, normoactive BS, soft and non-tender.  MSK" left leg shortened and externally rotated, good cap refill, good pedal pulse  Skin: Warm and dry  Neuro: AAOx3, no focal neurological deficit  Psych: Cooperative    Labs     Recent Labs     06/05/23  0059   WBC 13.61*   RBC 3.97*   HGB 12.3   HCT 36.1*   MCV 90.9   MCH 31.0   MCHC 34.1   RDW 12.4        Recent Labs     06/05/23  0059   PROTIME 12.3*   INR 0.92   PTT 27.5      Recent Labs     06/05/23  0059   *   K 4.2   CHLORIDE 100   CO2 24   BUN 18.3   CREATININE 1.02   EGFRNORACEVR 56   GLUCOSE 240*   CALCIUM 8.6   ALBUMIN 3.3*   GLOBULIN 2.4   ALKPHOS 64   ALT 16   AST 12   BILITOT 0.3     No results for input(s): LACTIC in the last 72 hours.  No results for input(s): CPK, TROPONINI in the last 72 hours.       Microbiology Results (last 7 days)       ** No results found for the last 168 hours. **           Imaging     X-Ray Hip 2 or 3 views Left (with Pelvis when performed)    (Results Pending)   X-Ray Chest AP Portable    (Results Pending)   X-Ray Femur 2 View Left    (Results Pending)     Assessment & Plan   Left Proximal Femur Fracture s/p GLF  Leukocytosis, likely stress response  Essential HTN  DM Type II  Hypothyroidism  CAD s/p stent 7 yrs ago on ASA 81 mg daily    PLAN:  - NPO, analgesics. EKG pending  - Ortho consulted, plans to take to OR today  - Unable to verify home meds, nursing staff notified to complete home med rec  - AM Labs  - VTE Prophylaxis:SCD to unaffected leg    I, Sadia Ash, FNP-C have discussed this patients case with Dr. Cuellar who agrees with the diagnosis and treatment " plan.

## 2023-06-05 NOTE — CONSULTS
[facility] - Emergency Dept  Orthopedic Trauma  Consult Note    Patient Name: Vickie Rahman  MRN: [id number]  Admission Date: 6/4/2023  Hospital Length of Stay: 0 days  Attending Provider: George Ray MD  Primary Care Provider: Mau Dominique Iii, MD        Inpatient consult to Orthopedic Surgery  Consult performed by: Ace Del Cid DO  Consult ordered by: ZANDRA Holly      Subjective:         Chief Complaint:   Chief Complaint   Patient presents with    Fall     Pt fell down 3 steps, -LOC, c/o left hip pain, no shortening or rotation noted. +PMS. No other injuries or pain noted. No bleeding noted.         HPI:  Patient has Left hip pain status post ground level fall.  She is diagnosed with a intertrochanteric proximal femur fracture dull achy pain to the hip without radiation no previous injury.  Patient has no numbness or tingling.  Pain medication makes it better rest makes it better movement makes it worse.    Past Medical History:   Diagnosis Date    Coronary artery disease     Diabetes mellitus     Hypercholesteremia     Hypertension        Past Surgical History:   Procedure Laterality Date    BLADDER SURGERY      cardiac stents      EXCISIONAL HEMORRHOIDECTOMY      SHOULDER SURGERY         Review of patient's allergies indicates:  No Known Allergies    Current Facility-Administered Medications   Medication    acetaminophen tablet 650 mg    acetaminophen tablet 650 mg    aluminum-magnesium hydroxide-simethicone 200-200-20 mg/5 mL suspension 30 mL    HYDROmorphone (PF) injection 1 mg    insulin aspart U-100 injection 1-10 Units    lactated ringers infusion    morphine injection 2 mg    ondansetron injection 4 mg    prochlorperazine injection Soln 5 mg    sodium chloride 0.9% flush 10 mL    trazodone split tablet 25 mg     Current Outpatient Medications   Medication Sig    amitriptyline (ELAVIL) 25 MG tablet Take 25 mg by mouth.    glimepiride (AMARYL) 2 MG tablet Take 2 mg by mouth 2  "(two) times daily.    isosorbide mononitrate (IMDUR) 30 MG 24 hr tablet     losartan-hydrochlorothiazide 100-12.5 mg (HYZAAR) 100-12.5 mg Tab Take 1 tablet by mouth.    metFORMIN (GLUCOPHAGE-XR) 500 MG ER 24hr tablet     metoprolol succinate (TOPROL-XL) 25 MG 24 hr tablet Take 25 mg by mouth.    pioglitazone (ACTOS) 15 MG tablet     pravastatin (PRAVACHOL) 40 MG tablet     SYNTHROID 88 mcg tablet Take 88 mcg by mouth every morning.     Family History    None       Tobacco Use    Smoking status: Never    Smokeless tobacco: Never   Substance and Sexual Activity    Alcohol use: Not Currently    Drug use: Not Currently    Sexual activity: Not on file       ROS:  Constitutional: Denies fever chills  Eyes: No change in vision  ENT: No ringing or current infections  CV: No chest pain  Resp: No labored breathing  MSK: Pain evident at site of injury located in HPI,   Integ: No signs of abrasions or lacerations  Neuro: No numbness or tingling  Lymphatic: No swelling outside the area of injury   Objective:     Vital Signs (Most Recent):  Temp: 97.7 °F (36.5 °C) (06/05/23 0330)  Pulse: 82 (06/05/23 0633)  Resp: 18 (06/05/23 0633)  BP: 105/69 (06/05/23 0633)  SpO2: 98 % (06/05/23 0633) Vital Signs (24h Range):  Temp:  [97.7 °F (36.5 °C)-98.2 °F (36.8 °C)] 97.7 °F (36.5 °C)  Pulse:  [82-90] 82  Resp:  [16-21] 18  SpO2:  [95 %-98 %] 98 %  BP: (105-174)/(58-88) 105/69     Weight: 59 kg (130 lb)  Height: 5' 6" (167.6 cm)  Body mass index is 20.98 kg/m².    No intake or output data in the 24 hours ending 06/05/23 0718    Ortho/SPM Exam  General the patient is alert and oriented x3 no acute distress nontoxic-appearing appropriate affect.    Constitutional: Vital signs are examined and stable.  Resp: No signs of labored breathing               LLE: -Skin: painful log roll No signs of new abrasions or lacerations, no scars           -MSK: EHL/FHL, Gastroc/Tib anterior Strength 5/5           -Neuro:  Sensation intact to light touch L3-S1 " dermatomes           -Lymphatic: No signs of lymphadenopathy           -CV: Capillary refill is less than 2 seconds. DP/PT pulses 2/4. Compartments soft and compressible       Significant Labs:   Recent Lab Results         06/05/23  0556   06/05/23  0059        Albumin/Globulin Ratio   1.4       Albumin   3.3       Alkaline Phosphatase   64       ALT   16       aPTT   27.5  Comment: For Minimal Heparin Infusion, the goal aPTT 64-85 seconds corresponds to an anti-Xa of 0.3-0.5.    For Low Intensity and High Intensity Heparin, the goal aPTT  seconds corresponds to an anti-Xa of 0.3-0.7       AST   12       Baso #   0.04       Basophil %   0.3       BILIRUBIN TOTAL   0.3       BUN   18.3       Calcium   8.6       Chloride   100       CO2   24       Creatinine   1.02       eGFR   56       Eos #   0.10       Eosinophil %   0.7       Globulin, Total   2.4       Glucose   240       Hematocrit   36.1       Hemoglobin   12.3       Immature Grans (Abs)   0.08       Immature Granulocytes   0.6       INR   0.92       Lymph #   2.50       LYMPH %   18.4       MCH   31.0       MCHC   34.1       MCV   90.9       Mono #   0.96       Mono %   7.1       MPV   8.4       Neut #   9.93       Neut %   72.9       nRBC   0.0       Platelets   270       POCT Glucose 196         Potassium   4.2       PROTEIN TOTAL   5.7       Protime   12.3       RBC   3.97       RDW   12.4       Sodium   135       WBC   13.61             All pertinent labs within the past 24 hours have been reviewed.  Recent Lab Results         06/05/23  0556   06/05/23  0059        Albumin/Globulin Ratio   1.4       Albumin   3.3       Alkaline Phosphatase   64       ALT   16       aPTT   27.5  Comment: For Minimal Heparin Infusion, the goal aPTT 64-85 seconds corresponds to an anti-Xa of 0.3-0.5.    For Low Intensity and High Intensity Heparin, the goal aPTT  seconds corresponds to an anti-Xa of 0.3-0.7       AST   12       Baso #   0.04       Basophil %    0.3       BILIRUBIN TOTAL   0.3       BUN   18.3       Calcium   8.6       Chloride   100       CO2   24       Creatinine   1.02       eGFR   56       Eos #   0.10       Eosinophil %   0.7       Globulin, Total   2.4       Glucose   240       Hematocrit   36.1       Hemoglobin   12.3       Immature Grans (Abs)   0.08       Immature Granulocytes   0.6       INR   0.92       Lymph #   2.50       LYMPH %   18.4       MCH   31.0       MCHC   34.1       MCV   90.9       Mono #   0.96       Mono %   7.1       MPV   8.4       Neut #   9.93       Neut %   72.9       nRBC   0.0       Platelets   270       POCT Glucose 196         Potassium   4.2       PROTEIN TOTAL   5.7       Protime   12.3       RBC   3.97       RDW   12.4       Sodium   135       WBC   13.61                Significant Imaging: I have reviewed all pertinent imaging results/findings.  X-Ray Chest AP Portable    Result Date: 6/5/2023  EXAMINATION: XR CHEST AP PORTABLE CLINICAL HISTORY: Unspecified fall, initial encounter TECHNIQUE: Single view of the chest COMPARISON: 10/17/2020 FINDINGS: No focal opacification, pleural effusion, or pneumothorax. The cardiomediastinal silhouette is within normal limits. No acute osseous abnormality.     No acute cardiopulmonary process. Electronically signed by: Zachary Damon Date:    06/05/2023 Time:    06:35       Assessment/Plan:     Active Diagnoses:    Diagnosis Date Noted POA    PRINCIPAL PROBLEM:  Other fracture of left femur, initial encounter for closed fracture [S72.8X2A] 06/05/2023 Unknown      Problems Resolved During this Admission:           Patient has a left intertrochanteric femur fracture meeting surgical indications for stabilization.  Patient and family understand risks best procedure stated below.  This will allow immediate weight-bearing and excellent pain control.  We will get the patient to surgery within 24 hour window.   I explained that surgery and the nature of their condition are not without  risks. These include, but are not limited to, bleeding, infection, neurovascular compromise, malunion, nonunion, hardware complications, wound complications, scarring, cosmetic defects, need for later and/or repeated surgeries, pain, loss of ROM, loss of function, PTOA, deformity, stance/gait and/or functional abnormalities, thromboembolic complications, compartment syndrome, loss of limb, loss of life, anesthetic complications, and other imponderables. I explained that these can occur despite the adequacy of treatments rendered, and that their risks are heightened given the nature of their condition. They verbalized understanding. They would like to continue with surgery at this time. If appropriate family was involved with surgical discussion.           This note/OR report was created with the assistance of  voice recognition software or phone  dictation.  There may be transcription errors as a result of using this technology however minimal. Effort has been made to assure accuracy of transcription but any obvious errors or omissions should be clarified with the author of the document.       Ace Del Cid, DO   Orthopedic Trauma Surgery  [facility] - Emergency Dept

## 2023-06-06 LAB
ALBUMIN SERPL-MCNC: 2.5 G/DL (ref 3.4–4.8)
ALBUMIN/GLOB SERPL: 1.2 RATIO (ref 1.1–2)
ALP SERPL-CCNC: 49 UNIT/L (ref 40–150)
ALT SERPL-CCNC: 12 UNIT/L (ref 0–55)
AST SERPL-CCNC: 14 UNIT/L (ref 5–34)
BASOPHILS # BLD AUTO: 0.04 X10(3)/MCL
BASOPHILS NFR BLD AUTO: 0.5 %
BILIRUBIN DIRECT+TOT PNL SERPL-MCNC: 0.3 MG/DL
BUN SERPL-MCNC: 20.3 MG/DL (ref 9.8–20.1)
CALCIUM SERPL-MCNC: 7.6 MG/DL (ref 8.4–10.2)
CHLORIDE SERPL-SCNC: 101 MMOL/L (ref 98–107)
CO2 SERPL-SCNC: 24 MMOL/L (ref 23–31)
CREAT SERPL-MCNC: 1.2 MG/DL (ref 0.55–1.02)
EOSINOPHIL # BLD AUTO: 0.01 X10(3)/MCL (ref 0–0.9)
EOSINOPHIL NFR BLD AUTO: 0.1 %
ERYTHROCYTE [DISTWIDTH] IN BLOOD BY AUTOMATED COUNT: 13 % (ref 11.5–17)
GFR SERPLBLD CREATININE-BSD FMLA CKD-EPI: 46 MLS/MIN/1.73/M2
GLOBULIN SER-MCNC: 2.1 GM/DL (ref 2.4–3.5)
GLUCOSE SERPL-MCNC: 220 MG/DL (ref 82–115)
HCT VFR BLD AUTO: 28.1 % (ref 37–47)
HGB BLD-MCNC: 9.1 G/DL (ref 12–16)
IMM GRANULOCYTES # BLD AUTO: 0.03 X10(3)/MCL (ref 0–0.04)
IMM GRANULOCYTES NFR BLD AUTO: 0.4 %
LYMPHOCYTES # BLD AUTO: 1.46 X10(3)/MCL (ref 0.6–4.6)
LYMPHOCYTES NFR BLD AUTO: 17.7 %
MCH RBC QN AUTO: 30.4 PG (ref 27–31)
MCHC RBC AUTO-ENTMCNC: 32.4 G/DL (ref 33–36)
MCV RBC AUTO: 94 FL (ref 80–94)
MONOCYTES # BLD AUTO: 0.83 X10(3)/MCL (ref 0.1–1.3)
MONOCYTES NFR BLD AUTO: 10.1 %
NEUTROPHILS # BLD AUTO: 5.87 X10(3)/MCL (ref 2.1–9.2)
NEUTROPHILS NFR BLD AUTO: 71.2 %
NRBC BLD AUTO-RTO: 0 %
PLATELET # BLD AUTO: 235 X10(3)/MCL (ref 130–400)
PMV BLD AUTO: 9.1 FL (ref 7.4–10.4)
POCT GLUCOSE: 162 MG/DL (ref 70–110)
POCT GLUCOSE: 177 MG/DL (ref 70–110)
POCT GLUCOSE: 212 MG/DL (ref 70–110)
POTASSIUM SERPL-SCNC: 4.6 MMOL/L (ref 3.5–5.1)
PROT SERPL-MCNC: 4.6 GM/DL (ref 5.8–7.6)
RBC # BLD AUTO: 2.99 X10(6)/MCL (ref 4.2–5.4)
SODIUM SERPL-SCNC: 133 MMOL/L (ref 136–145)
WBC # SPEC AUTO: 8.24 X10(3)/MCL (ref 4.5–11.5)

## 2023-06-06 PROCEDURE — 97162 PT EVAL MOD COMPLEX 30 MIN: CPT

## 2023-06-06 PROCEDURE — 63600175 PHARM REV CODE 636 W HCPCS: Performed by: INTERNAL MEDICINE

## 2023-06-06 PROCEDURE — 25000003 PHARM REV CODE 250: Performed by: PHYSICIAN ASSISTANT

## 2023-06-06 PROCEDURE — 25000003 PHARM REV CODE 250: Performed by: INTERNAL MEDICINE

## 2023-06-06 PROCEDURE — 85025 COMPLETE CBC W/AUTO DIFF WBC: CPT | Performed by: PHYSICIAN ASSISTANT

## 2023-06-06 PROCEDURE — 63600175 PHARM REV CODE 636 W HCPCS: Performed by: PHYSICIAN ASSISTANT

## 2023-06-06 PROCEDURE — 80053 COMPREHEN METABOLIC PANEL: CPT | Performed by: PHYSICIAN ASSISTANT

## 2023-06-06 PROCEDURE — 51701 INSERT BLADDER CATHETER: CPT

## 2023-06-06 PROCEDURE — 25000003 PHARM REV CODE 250: Performed by: NURSE PRACTITIONER

## 2023-06-06 PROCEDURE — 63600175 PHARM REV CODE 636 W HCPCS: Performed by: NURSE PRACTITIONER

## 2023-06-06 PROCEDURE — 51798 US URINE CAPACITY MEASURE: CPT

## 2023-06-06 PROCEDURE — 11000001 HC ACUTE MED/SURG PRIVATE ROOM

## 2023-06-06 PROCEDURE — 97166 OT EVAL MOD COMPLEX 45 MIN: CPT

## 2023-06-06 PROCEDURE — 27000221 HC OXYGEN, UP TO 24 HOURS

## 2023-06-06 RX ORDER — SODIUM CHLORIDE 9 MG/ML
INJECTION, SOLUTION INTRAVENOUS CONTINUOUS
Status: ACTIVE | OUTPATIENT
Start: 2023-06-06 | End: 2023-06-06

## 2023-06-06 RX ORDER — GLIMEPIRIDE 1 MG/1
2 TABLET ORAL 2 TIMES DAILY
Status: DISCONTINUED | OUTPATIENT
Start: 2023-06-06 | End: 2023-06-06

## 2023-06-06 RX ORDER — GLIMEPIRIDE 1 MG/1
2 TABLET ORAL
Status: DISCONTINUED | OUTPATIENT
Start: 2023-06-06 | End: 2023-06-08 | Stop reason: HOSPADM

## 2023-06-06 RX ORDER — KETOROLAC TROMETHAMINE 30 MG/ML
15 INJECTION, SOLUTION INTRAMUSCULAR; INTRAVENOUS EVERY 6 HOURS
Status: DISCONTINUED | OUTPATIENT
Start: 2023-06-06 | End: 2023-06-08 | Stop reason: HOSPADM

## 2023-06-06 RX ADMIN — KETOROLAC TROMETHAMINE 15 MG: 30 INJECTION, SOLUTION INTRAMUSCULAR; INTRAVENOUS at 11:06

## 2023-06-06 RX ADMIN — SODIUM CHLORIDE, POTASSIUM CHLORIDE, SODIUM LACTATE AND CALCIUM CHLORIDE: 600; 310; 30; 20 INJECTION, SOLUTION INTRAVENOUS at 05:06

## 2023-06-06 RX ADMIN — KETOROLAC TROMETHAMINE 15 MG: 30 INJECTION, SOLUTION INTRAMUSCULAR; INTRAVENOUS at 05:06

## 2023-06-06 RX ADMIN — METHOCARBAMOL 500 MG: 500 TABLET ORAL at 09:06

## 2023-06-06 RX ADMIN — ENOXAPARIN SODIUM 40 MG: 40 INJECTION SUBCUTANEOUS at 04:06

## 2023-06-06 RX ADMIN — DOCUSATE SODIUM 100 MG: 100 CAPSULE, LIQUID FILLED ORAL at 09:06

## 2023-06-06 RX ADMIN — ACETAMINOPHEN 650 MG: 325 TABLET ORAL at 09:06

## 2023-06-06 RX ADMIN — INSULIN ASPART 4 UNITS: 100 INJECTION, SOLUTION INTRAVENOUS; SUBCUTANEOUS at 11:06

## 2023-06-06 RX ADMIN — CEFAZOLIN SODIUM 2 G: 2 SOLUTION INTRAVENOUS at 05:06

## 2023-06-06 RX ADMIN — LEVOTHYROXINE SODIUM 88 MCG: 88 TABLET ORAL at 07:06

## 2023-06-06 RX ADMIN — OXYCODONE HYDROCHLORIDE 5 MG: 5 TABLET ORAL at 03:06

## 2023-06-06 RX ADMIN — AMITRIPTYLINE HYDROCHLORIDE 25 MG: 25 TABLET, FILM COATED ORAL at 09:06

## 2023-06-06 RX ADMIN — SODIUM CHLORIDE: 9 INJECTION, SOLUTION INTRAVENOUS at 11:06

## 2023-06-06 RX ADMIN — GLIMEPIRIDE 2 MG: 1 TABLET ORAL at 05:06

## 2023-06-06 RX ADMIN — OXYCODONE HYDROCHLORIDE 5 MG: 5 TABLET ORAL at 09:06

## 2023-06-06 RX ADMIN — METOPROLOL SUCCINATE 25 MG: 25 TABLET, EXTENDED RELEASE ORAL at 09:06

## 2023-06-06 RX ADMIN — PRAVASTATIN SODIUM 40 MG: 40 TABLET ORAL at 09:06

## 2023-06-06 RX ADMIN — INSULIN ASPART 2 UNITS: 100 INJECTION, SOLUTION INTRAVENOUS; SUBCUTANEOUS at 04:06

## 2023-06-06 NOTE — PT/OT/SLP EVAL
Physical Therapy Evaluation    Patient Name:  Vickie Rahman   MRN:  [id number]    Recommendations:     Discharge Recommendations: rehabilitation facility   Discharge Equipment Recommendations: walker, rolling   Barriers to discharge: Impaired mobility    Assessment:     Vickie Rahman is a 78 y.o. female admitted with a medical diagnosis of fall with proximal L femur fx s/p IMN. She presents with the following impairments/functional limitations: weakness, impaired endurance, impaired functional mobility, gait instability, impaired balance, pain. Patient tolerated PT evaluation well, mobilizing with modA and increased time for bed mobility, transfers, and steps at EOB. Patient is appropriate for continued acute PT services with recommended d/c to inpatient rehab facility.     Rehab Prognosis: Good; patient would benefit from acute skilled PT services to address these deficits and reach maximum level of function.    Recent Surgery: Procedure(s) (LRB):  INSERTION, INTRAMEDULLARY JAMAL, FEMUR (Left) 1 Day Post-Op    Plan:     During this hospitalization, patient to be seen 6 x/week to address the identified rehab impairments via gait training, therapeutic activities, therapeutic exercises, neuromuscular re-education and progress toward the following goals:    Plan of Care Expires:  06/30/23    Subjective     Chief Complaint: pain at surgical site  Patient/Family Comments/goals: to get stronger  Pain/Comfort:  Pain Rating 1: 8/10  Location - Side 1: Left  Location 1: leg  Pain Addressed 1: Pre-medicate for activity, Reposition, Distraction    Patients cultural, spiritual, Confucianist conflicts given the current situation: no    Living Environment:  Pt lives with family in [place], 5 steps to enter with rail on L side.  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  Equipment used at home: none.  DME owned (not currently used): none.  Upon discharge, patient will have assistance from  family.    Objective:     Communicated with RN prior to session.  Patient found HOB elevated with PureWick, peripheral IV  upon PT entry to room.    General Precautions: Standard, fall  Orthopedic Precautions:LLE weight bearing as tolerated (LLE ROMAT)   Braces: N/A  Respiratory Status: Nasal cannula, flow 1.5 L/min  Blood Pressure: 108/57, HR: 98      Exams:  Sensation: -       Intact  RLE ROM: WNL  RLE Strength: WNL  LLE ROM: appears to be WFL, limited by pain  LLE Strength: appears to be 3 to 4/5, limited by pain  Skin integrity: Visible skin intact      Functional Mobility:  Bed Mobility:  Supine to Sit: moderate assistance  Transfers:  Sit to Stand:  moderate assistance with rolling walker  Gait: patient ambulated x2 side steps with modA, RW and increased time- limited by pain      AM-PAC 6 CLICK MOBILITY  Total Score:11       Treatment & Education:    Patient provided with verbal education regarding PT POC.  Understanding was verbalized.     Patient left HOB elevated with all lines intact, call button in reach, and RN notified.    GOALS:   Multidisciplinary Problems       Physical Therapy Goals          Problem: Physical Therapy    Goal Priority Disciplines Outcome Goal Variances Interventions   Physical Therapy Goal     PT, PT/OT Ongoing, Progressing     Description: Goals to be met by: 23     Patient will increase functional independence with mobility by performin. Supine to sit with Modified [place]  2. Sit to stand transfer with Stand-by Assistance  3. Gait  x 100 feet with Stand-by Assistance using Rolling Walker.                          History:     Past Medical History:   Diagnosis Date    Coronary artery disease     Diabetes mellitus     Hypercholesteremia     Hypertension        Past Surgical History:   Procedure Laterality Date    BLADDER SURGERY      cardiac stents      EXCISIONAL HEMORRHOIDECTOMY      INTRAMEDULLARY RODDING OF FEMUR Left 2023    Procedure: INSERTION,  INTRAMEDULLARY JAMAL, FEMUR;  Surgeon: Ace Del Cid DO;  Location: [facility];  Service: Orthopedics;  Laterality: Left;  supine hana table synthes c arm    SHOULDER SURGERY         Time Tracking:     PT Received On: 06/06/23  PT Start Time: 1022     PT Stop Time: 1046  PT Total Time (min): 24 min     Billable Minutes: Evaluation Mod complexity      06/06/2023

## 2023-06-06 NOTE — PLAN OF CARE
Problem: Physical Therapy  Goal: Physical Therapy Goal  Description: Goals to be met by: 23     Patient will increase functional independence with mobility by performin. Supine to sit with Modified [place]  2. Sit to stand transfer with Stand-by Assistance  3. Gait  x 100 feet with Stand-by Assistance using Rolling Walker.     Outcome: Ongoing, Progressing

## 2023-06-06 NOTE — PROGRESS NOTES
[facility] Medicine Progress Note        Chief Complaint: Inpatient Follow-up    HPI:   78-year-old female with significant history of HTN, HLD, chronic kidney disease stage IIIA, CAD status post PCI, type 2 diabetes mellitus, hypothyroidism presented to the ED complaining of left hip pain after sustaining a ground level fall.  Patient tripped and fell while walking up the stairs in her house landing on her left hip on cement.  At baseline patient is able to ambulate independently and is independent of all activities of daily living paid patient was afebrile, hemodynamically stable in the ED.  Imaging revealed left proximal femoral fracture.  Orthopedics consulted and they planned to take her to OR on 6/5.  Patient was taken to OR on 06/05 and she underwent intramedullary nailing.  Postop recommendations per Orthopedics paid on multimodal pain control.  Closely monitoring labs.      Interval Hx:   Patient seen at bedside.  Comfortably laying in bed.  Pain under control.  Hemodynamics stable.  No acute events overnight.  Awake, alert and fully oriented.    Objective/physical exam:  General: In no acute distress, afebrile  Chest: Clear to auscultation bilaterally  Heart: S1, S2, no appreciable murmur  Abdomen: Soft, nontender, BS +  MSK: Warm, no lower extremity edema, no clubbing or cyanosis  Neurologic: Alert and oriented x4,   VITAL SIGNS: 24 HRS MIN & MAX LAST   Temp  Min: 97.9 °F (36.6 °C)  Max: 98.6 °F (37 °C) 98.2 °F (36.8 °C)   BP  Min: 91/53  Max: 160/84 (!) 103/53   Pulse  Min: 80  Max: 193  105   Resp  Min: 14  Max: 23 18   SpO2  Min: 64 %  Max: 100 % 95 %       Recent Labs   Lab 06/06/23  0405   WBC 8.24   RBC 2.99*   HGB 9.1*   HCT 28.1*   MCV 94.0   MCH 30.4   MCHC 32.4*   RDW 13.0      MPV 9.1         Recent Labs   Lab 06/06/23  0405   *   K 4.6   CO2 24   BUN 20.3*   CREATININE 1.20*   CALCIUM 7.6*   ALBUMIN 2.5*   ALKPHOS 49   ALT 12   AST 14    BILITOT 0.3          Microbiology Results (last 7 days)       ** No results found for the last 168 hours. **             Scheduled Med:   amitriptyline  25 mg Oral QHS    docusate sodium  100 mg Oral Daily    enoxparin  40 mg Subcutaneous Q24H (prophylaxis, 1700)    losartan  100 mg Oral Daily    And    hydroCHLOROthiazide  12.5 mg Oral Daily    isosorbide mononitrate  30 mg Oral Daily    levothyroxine  88 mcg Oral QAM    methocarbamoL  500 mg Oral TID    metoprolol succinate  25 mg Oral Daily    pravastatin  40 mg Oral QHS          Assessment/Plan:    Closed left intertrochanteric femoral fracture status post intramedullary nailing 6/5, postop day 1.   Mechanical fall leading to above   Acute postop blood-loss anemia   Mild acute kidney injury   History of CAD status post PCI  Essential HTN-stable   HLD  Type 2 diabetes mellitus with hyperglycemia   LEONOR on CKD stage IIIA  Prophylaxis    Postop recommendations per Orthopedics  Continue multimodal pain control   Continue PT/OT   Continue bowel regimen   Hemoglobin is more than 9   No indication for transfusion   Mild acute kidney injury noted  DC Ringer lactate given hyperglycemia   Utilize normal saline at 75 cc/hour x1 bag  Sliding scale for diabetes mellitus  Resume glimepiride 2 mg b.i.d. which is her home dose  Continue Imdur, losartan HCTZ, Toprol-XL for hypertension, blood pressure stable   Will be cautious with losartan HCTZ given mild acute kidney injury   Continue other home meds-statin, amitriptyline, levothyroxine   DVT prophylaxis-Lovenox    Await PT/OT recommendations, will most likely need inpatient rehab    Yeny Dumont MD   06/06/2023

## 2023-06-06 NOTE — PLAN OF CARE
Pt fell at home fx hip now needs rehab  Has no equipment at home  PCP Dominique  Son and pt live together in mobile home in [place]  [phone]  FOC signed  Referral sent through [facility] and spoke with Shara. She will review and update me with their decision.

## 2023-06-06 NOTE — NURSING
Nurses Note -- 4 Eyes      6/5/23   1700 PM      Skin assessed during: Admit      [x] No Altered Skin Integrity Present    [x]Prevention Measures Documented      [] Yes- Altered Skin Integrity Present or Discovered   [] LDA Added if Not in Epic (Describe Wound)   [] New Altered Skin Integrity was Present on Admit and Documented in LDA   [] Wound Image Taken    Wound Care Consulted? No    Attending Nurse:  Nickie Lugo RN     Second RN/Staff Member:  Brianna Denis RN

## 2023-06-06 NOTE — PT/OT/SLP EVAL
Occupational Therapy  Evaluation    Name: Vickie Rahman  MRN: [id number]  Admitting Diagnosis: Fall: L femur intertrochanteric fx, s/p IMN  Recent Surgery: Procedure(s) (LRB):  INSERTION, INTRAMEDULLARY JAMAL, FEMUR (Left) 1 Day Post-Op    Recommendations:     Discharge Recommendations:rehab  Discharge Equipment Recommendations:  walker, rolling  Barriers to discharge:  None    Assessment:     Vickie Rahman is a 78 y.o. female with a medical diagnosis of  Fall: L femur intertrochanteric fx, s/p IMN. Performance deficits affecting function: weakness, impaired functional mobility, impaired endurance, impaired self care skills, pain.  Pt appeared motivated to participate in therapy today. Pt reported her daughter would come to [facility] to provide assistance upon d/c. Pt would benefit from rehab services upon d/c.     Rehab Prognosis: Good; patient would benefit from acute skilled OT services to address these deficits and reach maximum level of function.       Plan:     Patient to be seen 6 x/week to address the above listed problems via self-care/home management, therapeutic activities, therapeutic exercises  Plan of Care Expires: 06/20/23  Plan of Care Reviewed with: patient    Subjective     Patient/Family Comments/goals: to return home    Occupational Profile:  Living Environment: Pt reported she lives in a mobile home c her son. Pt reported she has ~5-6 steps to enter c a rail on L side. Pt has a standard tub/shower combo at home  Previous level of function: Independent in all ADLs and mobility  Equipment Used at Home: none  Assistance upon Discharge: Pt reported daughter could provide assistance for a few days upon d/c. Pt would benefit from rehab services upon d/c.     Pain/Comfort:  Pain Rating 1: 0/10    Patients cultural, spiritual, [employer]aritan conflicts given the current situation: no    Objective:     Patient found HOB elevated with telemetry, PureWick upon OT entry to room.    General Precautions:  Standard, fall  Orthopedic Precautions: LLE weight bearing as tolerated  Braces: N/A  Respiratory Status: Nasal cannula, flow 1 L/min    Occupational Performance:    Bed Mobility:    Patient completed Rolling/Turning to Left with  moderate assistance  Patient completed Scooting/Bridging with minimum assistance  Patient completed Supine to Sit with moderate assistance  Patient completed Sit to Supine with moderate assistance  Pt required mod A for bed mobility d/t resistance.     Functional Mobility/Transfers:  Patient completed Sit <> Stand Transfer with moderate assistance  with  rolling walker   Patient completed Bed <> Chair Transfer using Stand Pivot technique with moderate assistance with rolling walker  Patient completed Toilet Transfer Stand Pivot technique with maximal assistance with  rolling walker  Functional Mobility: Pt unable to ambulate d/t increased pain in standing. Pt transferred to chair with mod A to get to the bathroom.     Activities of Daily Living:  Lower Body Dressing: Pt attempted donning/doffing socks at bedside. Pt able to sanjay/doff R sock in figure 4 position independently. Pt unable to complete figure 4 position of LLE and unable to hinge forward to manipulate socks.     Toileting: contact guard assistance Pt completed toilet hygiene with CGA. Pt urinated at time of OT eval.     Cognitive/Visual Perceptual:  Cognitive/Psychosocial Skills:     -       Oriented to: Person, Place, Time, and Situation   -       Follows Commands/attention:Follows multistep  commands  -       Safety awareness/insight to disability: intact   -       Mood/Affect/Coping skills/emotional control: Appropriate to situation, Cooperative, Pleasant, and Guarded    Physical Exam:  Upper Extremity Range of Motion:     -       Right Upper Extremity: WFL  -       Left Upper Extremity: WFL  Upper Extremity Strength:    -       Right Upper Extremity: WFL  -       Left Upper Extremity: WFL    Therapeutic Positioning  Risk  for acquired pressure injuries is decreased due to ability to get to BSC/toilet with assist.    OT interventions performed during the course of today's session in an effort to prevent and/or reduce acquired pressure injuries:   Therapeutic positioning completed       OT recommendations for therapeutic positioning throughout hospitalization:   Follow [facility] Pressure Injury Prevention Protocol      Patient Education:  Patient provided with verbal education regarding OT role/goals/POC, post op precautions, fall prevention, safety awareness, and Discharge/DME recommendations.  Understanding was verbalized.     Patient left up in chair with all lines intact and call button in reach    GOALS:   Multidisciplinary Problems       Occupational Therapy Goals          Problem: Occupational Therapy    Goal Priority Disciplines Outcome Interventions   Occupational Therapy Goal     OT, PT/OT Ongoing, Progressing    Description: Goals to be met by: 6/20/23     Patient will increase functional independence with ADLs by performing:    UE Dressing with Modified [place].  LE Dressing with Modified [place].  Grooming while standing at sink with Modified [place].  Toileting from toilet with Modified [place] for hygiene and clothing management.   Toilet transfer to toilet with Modified [place].                         History:     Past Medical History:   Diagnosis Date    Coronary artery disease     Diabetes mellitus     Hypercholesteremia     Hypertension          Past Surgical History:   Procedure Laterality Date    BLADDER SURGERY      cardiac stents      EXCISIONAL HEMORRHOIDECTOMY      INTRAMEDULLARY RODDING OF FEMUR Left 6/5/2023    Procedure: INSERTION, INTRAMEDULLARY JAMAL, FEMUR;  Surgeon: Ace Del Cid DO;  Location: [facility];  Service: Orthopedics;  Laterality: Left;  supine hana table synthes c arm    SHOULDER SURGERY         Time Tracking:     OT Date of Treatment:    OT Start Time: 1429  OT Stop Time:  1455  OT Total Time (min): 26 min    Billable Minutes:Evaluation moderate complexity    6/6/2023

## 2023-06-06 NOTE — PLAN OF CARE
Problem: Occupational Therapy  Goal: Occupational Therapy Goal  Description: Goals to be met by: 6/20/23     Patient will increase functional independence with ADLs by performing:    UE Dressing with Modified [place].  LE Dressing with Modified [place].  Grooming while standing at sink with Modified [place].  Toileting from toilet with Modified [place] for hygiene and clothing management.   Toilet transfer to toilet with Modified [place].    Outcome: Ongoing, Progressing

## 2023-06-06 NOTE — PROGRESS NOTES
[facility]  Orthopedics  Progress Note    Patient Name: Vickie Rahman  MRN: [id number]  Admission Date: 6/4/2023  Hospital Length of Stay: 1 days  Attending Provider: Taty Cuellar MD  Primary Care Provider: Mau Dominique Iii, MD  Follow-up For: Procedure(s) (LRB):  INSERTION, INTRAMEDULLARY JAMAL, FEMUR (Left)    Post-Operative Day: 1 Day Post-Op  Subjective:     Principal Problem:Other fracture of left femur, initial encounter for closed fracture    Principal Orthopedic Problem: 1 Day Post-Op   IMN left IT femur fracture    Interval History: Patient doing well this morning. Endorses some pain at the surgical site. Pain improved since yesterday. Denies numbness and tingling distally. Has not been up to work with physical therapy.     Review of patient's allergies indicates:  No Known Allergies    Current Facility-Administered Medications   Medication    acetaminophen tablet 650 mg    acetaminophen tablet 650 mg    aluminum-magnesium hydroxide-simethicone 200-200-20 mg/5 mL suspension 30 mL    amitriptyline tablet 25 mg    docusate sodium capsule 100 mg    enoxaparin injection 40 mg    losartan tablet 100 mg    And    hydroCHLOROthiazide tablet 12.5 mg    insulin aspart U-100 injection 1-10 Units    isosorbide mononitrate 24 hr tablet 30 mg    lactated ringers infusion    levothyroxine tablet 88 mcg    methocarbamoL tablet 500 mg    metoprolol succinate (TOPROL-XL) 24 hr tablet 25 mg    morphine injection 4 mg    ondansetron injection 4 mg    oxyCODONE immediate release tablet 5 mg    oxyCODONE immediate release tablet Tab 10 mg    pravastatin tablet 40 mg    prochlorperazine injection Soln 5 mg    sodium chloride 0.9% flush 10 mL    trazodone split tablet 25 mg     Objective:     Vital Signs (Most Recent):  Temp: 98.2 °F (36.8 °C) (06/06/23 0700)  Pulse: 106 (06/06/23 0700)  Resp: 18 (06/06/23 0700)  BP: (!) 91/53 (06/06/23 0711)  SpO2: 95 % (06/06/23 0700) Vital Signs (24h Range):  Temp:  " [97.9 °F (36.6 °C)-98.6 °F (37 °C)] 98.2 °F (36.8 °C)  Pulse:  [] 106  Resp:  [14-23] 18  SpO2:  [64 %-100 %] 95 %  BP: ()/(47-84) 91/53     Weight: 59 kg (130 lb)  Height: 5' 6" (167.6 cm)  Body mass index is 20.98 kg/m².      Intake/Output Summary (Last 24 hours) at 6/6/2023 0838  Last data filed at 6/6/2023 0819  Gross per 24 hour   Intake 850 ml   Output 700 ml   Net 150 ml       Physical Exam:   Musculoskeletal:     Left lower extremity: Dressing CDI without obvious drainage; compartments are soft and compressible; Tolerates gentle passive ROM at the hip and knee; appropriate tenderness to palpation; dorsi/plantar flexes the foot; SILT distally; BCR distally; DP pulse palpable      Diagnostic Findings:   Significant Labs:   Recent Lab Results  (Last 5 results in the past 72 hours)        06/06/23  0405   06/05/23  2150   06/05/23  1519   06/05/23  1057   06/05/23  0720        Albumin/Globulin Ratio 1.2               ABO and RH       O POS         Albumin 2.5               Alkaline Phosphatase 49               ALT 12               AST 14               Baso # 0.04               Basophil % 0.5               BILIRUBIN TOTAL 0.3               BUN 20.3               Calcium 7.6               Chloride 101               CO2 24               Creatinine 1.20               eGFR 46               Eos # 0.01               Eosinophil % 0.1               Globulin, Total 2.1               Glucose 220               Group & Rh         O POS       Hematocrit 28.1               Hemoglobin 9.1               Immature Grans (Abs) 0.03               Immature Granulocytes 0.4               Indirect Steff GEL         NEG       Lymph # 1.46               LYMPH % 17.7               MCH 30.4               MCHC 32.4               MCV 94.0               Mono # 0.83               Mono % 10.1               MPV 9.1               Neut # 5.87               Neut % 71.2               nRBC 0.0               Platelets 235               " POCT Glucose   314   129           Potassium 4.6               PROTEIN TOTAL 4.6               RBC 2.99               RDW 13.0               Sodium 133               Specimen Outdate         06/08/2023 23:59       WBC 8.24                                       Significant Imaging: I have reviewed and interpreted all pertinent imaging results/findings.     Assessment/Plan:     Active Diagnoses:    Diagnosis Date Noted POA    PRINCIPAL PROBLEM:  Other fracture of left femur, initial encounter for closed fracture [S72.8X2A] 06/05/2023 Unknown      Problems Resolved During this Admission:   H&H stable following surgery. Doing very well overall.   WBAT and ROMAT LLE.   Daily dry dressing changes beginning tomorrow.   Work with therapy on mobilizing today. Likely DC to rehab facility   Lovenox for DVT ppx during stay. Okay for Xarelto upon discharge for DVT ppx.  Follow up with Dr. Del Cid in approx 3 weeks for wound check and repeat x rays.   Will continue to monitor through the acute post operative period.       The above findings, diagnostics, and treatment plan were discussed with Dr. Del Cid who is in agreement with the plan of care except as stated in additional documentation.      Che Butterfield PA-C  Orthopedic Trauma Surgery  [facility]

## 2023-06-07 LAB
ABO + RH BLD: NORMAL
ABO + RH BLD: NORMAL
ALBUMIN SERPL-MCNC: 2.3 G/DL (ref 3.4–4.8)
ALBUMIN/GLOB SERPL: 1 RATIO (ref 1.1–2)
ALP SERPL-CCNC: 65 UNIT/L (ref 40–150)
ALT SERPL-CCNC: 15 UNIT/L (ref 0–55)
AST SERPL-CCNC: 22 UNIT/L (ref 5–34)
BASOPHILS # BLD AUTO: 0.03 X10(3)/MCL
BASOPHILS NFR BLD AUTO: 0.4 %
BILIRUBIN DIRECT+TOT PNL SERPL-MCNC: 0.3 MG/DL
BLD PROD TYP BPU: NORMAL
BLD PROD TYP BPU: NORMAL
BLOOD UNIT EXPIRATION DATE: NORMAL
BLOOD UNIT EXPIRATION DATE: NORMAL
BLOOD UNIT TYPE CODE: 5100
BLOOD UNIT TYPE CODE: 5100
BUN SERPL-MCNC: 24.2 MG/DL (ref 9.8–20.1)
CALCIUM SERPL-MCNC: 7.5 MG/DL (ref 8.4–10.2)
CHLORIDE SERPL-SCNC: 103 MMOL/L (ref 98–107)
CO2 SERPL-SCNC: 24 MMOL/L (ref 23–31)
CREAT SERPL-MCNC: 1.16 MG/DL (ref 0.55–1.02)
CROSSMATCH INTERPRETATION: NORMAL
CROSSMATCH INTERPRETATION: NORMAL
DISPENSE STATUS: NORMAL
DISPENSE STATUS: NORMAL
EOSINOPHIL # BLD AUTO: 0.19 X10(3)/MCL (ref 0–0.9)
EOSINOPHIL NFR BLD AUTO: 2.7 %
ERYTHROCYTE [DISTWIDTH] IN BLOOD BY AUTOMATED COUNT: 12.6 % (ref 11.5–17)
GFR SERPLBLD CREATININE-BSD FMLA CKD-EPI: 48 MLS/MIN/1.73/M2
GLOBULIN SER-MCNC: 2.4 GM/DL (ref 2.4–3.5)
GLUCOSE SERPL-MCNC: 107 MG/DL (ref 82–115)
HCT VFR BLD AUTO: 23.3 % (ref 37–47)
HGB BLD-MCNC: 7.7 G/DL (ref 12–16)
IMM GRANULOCYTES # BLD AUTO: 0.04 X10(3)/MCL (ref 0–0.04)
IMM GRANULOCYTES NFR BLD AUTO: 0.6 %
LYMPHOCYTES # BLD AUTO: 2.25 X10(3)/MCL (ref 0.6–4.6)
LYMPHOCYTES NFR BLD AUTO: 31.8 %
MCH RBC QN AUTO: 30.7 PG (ref 27–31)
MCHC RBC AUTO-ENTMCNC: 33 G/DL (ref 33–36)
MCV RBC AUTO: 92.8 FL (ref 80–94)
MONOCYTES # BLD AUTO: 0.77 X10(3)/MCL (ref 0.1–1.3)
MONOCYTES NFR BLD AUTO: 10.9 %
NEUTROPHILS # BLD AUTO: 3.8 X10(3)/MCL (ref 2.1–9.2)
NEUTROPHILS NFR BLD AUTO: 53.6 %
NRBC BLD AUTO-RTO: 0 %
PLATELET # BLD AUTO: 210 X10(3)/MCL (ref 130–400)
PMV BLD AUTO: 9.1 FL (ref 7.4–10.4)
POCT GLUCOSE: 109 MG/DL (ref 70–110)
POCT GLUCOSE: 219 MG/DL (ref 70–110)
POTASSIUM SERPL-SCNC: 4.6 MMOL/L (ref 3.5–5.1)
PROT SERPL-MCNC: 4.7 GM/DL (ref 5.8–7.6)
RBC # BLD AUTO: 2.51 X10(6)/MCL (ref 4.2–5.4)
SODIUM SERPL-SCNC: 133 MMOL/L (ref 136–145)
UNIT NUMBER: NORMAL
UNIT NUMBER: NORMAL
WBC # SPEC AUTO: 7.08 X10(3)/MCL (ref 4.5–11.5)

## 2023-06-07 PROCEDURE — 25000003 PHARM REV CODE 250: Performed by: PHYSICIAN ASSISTANT

## 2023-06-07 PROCEDURE — 25000003 PHARM REV CODE 250: Performed by: INTERNAL MEDICINE

## 2023-06-07 PROCEDURE — 97530 THERAPEUTIC ACTIVITIES: CPT

## 2023-06-07 PROCEDURE — 25000003 PHARM REV CODE 250: Performed by: NURSE PRACTITIONER

## 2023-06-07 PROCEDURE — 80053 COMPREHEN METABOLIC PANEL: CPT | Performed by: PHYSICIAN ASSISTANT

## 2023-06-07 PROCEDURE — 63600175 PHARM REV CODE 636 W HCPCS: Performed by: INTERNAL MEDICINE

## 2023-06-07 PROCEDURE — 36430 TRANSFUSION BLD/BLD COMPNT: CPT

## 2023-06-07 PROCEDURE — 97535 SELF CARE MNGMENT TRAINING: CPT

## 2023-06-07 PROCEDURE — P9016 RBC LEUKOCYTES REDUCED: HCPCS | Performed by: INTERNAL MEDICINE

## 2023-06-07 PROCEDURE — 63600175 PHARM REV CODE 636 W HCPCS: Performed by: NURSE PRACTITIONER

## 2023-06-07 PROCEDURE — 85025 COMPLETE CBC W/AUTO DIFF WBC: CPT | Performed by: PHYSICIAN ASSISTANT

## 2023-06-07 PROCEDURE — 11000001 HC ACUTE MED/SURG PRIVATE ROOM

## 2023-06-07 RX ORDER — HYDROCODONE BITARTRATE AND ACETAMINOPHEN 500; 5 MG/1; MG/1
TABLET ORAL
Status: DISCONTINUED | OUTPATIENT
Start: 2023-06-07 | End: 2023-06-08 | Stop reason: HOSPADM

## 2023-06-07 RX ADMIN — DOCUSATE SODIUM 100 MG: 100 CAPSULE, LIQUID FILLED ORAL at 09:06

## 2023-06-07 RX ADMIN — METHOCARBAMOL 500 MG: 500 TABLET ORAL at 03:06

## 2023-06-07 RX ADMIN — GLIMEPIRIDE 2 MG: 1 TABLET ORAL at 03:06

## 2023-06-07 RX ADMIN — PRAVASTATIN SODIUM 40 MG: 40 TABLET ORAL at 09:06

## 2023-06-07 RX ADMIN — LEVOTHYROXINE SODIUM 88 MCG: 88 TABLET ORAL at 06:06

## 2023-06-07 RX ADMIN — KETOROLAC TROMETHAMINE 15 MG: 30 INJECTION, SOLUTION INTRAMUSCULAR; INTRAVENOUS at 06:06

## 2023-06-07 RX ADMIN — AMITRIPTYLINE HYDROCHLORIDE 25 MG: 25 TABLET, FILM COATED ORAL at 09:06

## 2023-06-07 RX ADMIN — OXYCODONE HYDROCHLORIDE 5 MG: 5 TABLET ORAL at 10:06

## 2023-06-07 RX ADMIN — ENOXAPARIN SODIUM 40 MG: 40 INJECTION SUBCUTANEOUS at 04:06

## 2023-06-07 RX ADMIN — METHOCARBAMOL 500 MG: 500 TABLET ORAL at 09:06

## 2023-06-07 RX ADMIN — GLIMEPIRIDE 2 MG: 1 TABLET ORAL at 06:06

## 2023-06-07 RX ADMIN — INSULIN ASPART 4 UNITS: 100 INJECTION, SOLUTION INTRAVENOUS; SUBCUTANEOUS at 05:06

## 2023-06-07 RX ADMIN — ACETAMINOPHEN 650 MG: 325 TABLET, FILM COATED ORAL at 09:06

## 2023-06-07 NOTE — PROGRESS NOTES
[facility]  Orthopedics  Progress Note    Patient Name: Vickie Rahman  MRN: [id number]  Admission Date: 6/4/2023  Hospital Length of Stay: 2 days  Attending Provider: Yeny Dumont MD  Primary Care Provider: Mau Dominique Iii, MD  Follow-up For: Procedure(s) (LRB):  INSERTION, INTRAMEDULLARY JAMAL, FEMUR (Left)    Post-Operative Day: 1 Day Post-Op  Subjective:     Principal Problem:Other fracture of left femur, initial encounter for closed fracture    Principal Orthopedic Problem: 2 Days Post-Op   IMN left IT femur fracture    Interval History: Patient doing well this morning. Endorses some pain at the surgical site which is improving. Denies numbness and tingling distally. Was able to work with therapy but states she wasn't able to walk much.    Review of patient's allergies indicates:  No Known Allergies    Current Facility-Administered Medications   Medication    0.9%  NaCl infusion (for blood administration)    acetaminophen tablet 650 mg    acetaminophen tablet 650 mg    aluminum-magnesium hydroxide-simethicone 200-200-20 mg/5 mL suspension 30 mL    amitriptyline tablet 25 mg    docusate sodium capsule 100 mg    enoxaparin injection 40 mg    glimepiride tablet 2 mg    insulin aspart U-100 injection 1-10 Units    isosorbide mononitrate 24 hr tablet 30 mg    ketorolac injection 15 mg    levothyroxine tablet 88 mcg    methocarbamoL tablet 500 mg    metoprolol succinate (TOPROL-XL) 24 hr tablet 25 mg    morphine injection 4 mg    ondansetron injection 4 mg    oxyCODONE immediate release tablet 5 mg    oxyCODONE immediate release tablet Tab 10 mg    pravastatin tablet 40 mg    prochlorperazine injection Soln 5 mg    sodium chloride 0.9% flush 10 mL    trazodone split tablet 25 mg     Objective:     Vital Signs (Most Recent):  Temp: 99.5 °F (37.5 °C) (06/07/23 1430)  Pulse: 106 (06/07/23 1430)  Resp: 18 (06/07/23 1430)  BP: 138/69 (06/07/23 1430)  SpO2: 96 % (06/07/23 0711) Vital Signs  "(24h Range):  Temp:  [97.7 °F (36.5 °C)-99.5 °F (37.5 °C)] 99.5 °F (37.5 °C)  Pulse:  [] 106  Resp:  [18-20] 18  SpO2:  [92 %-98 %] 96 %  BP: ()/(53-74) 138/69     Weight: 59 kg (130 lb)  Height: 5' 6" (167.6 cm)  Body mass index is 20.98 kg/m².      Intake/Output Summary (Last 24 hours) at 6/7/2023 1444  Last data filed at 6/7/2023 1345  Gross per 24 hour   Intake 938.33 ml   Output 500 ml   Net 438.33 ml         Physical Exam:   Musculoskeletal:     Left lower extremity: Dressing CDI without obvious drainage; compartments are soft and compressible; Tolerates gentle passive ROM at the hip and knee; appropriate tenderness to palpation; dorsi/plantar flexes the foot; SILT distally; BCR distally; DP pulse palpable      Diagnostic Findings:   Significant Labs:   Recent Lab Results  (Last 5 results in the past 72 hours)        06/07/23  0406   06/07/23  0405   06/06/23  2148   06/06/23  1624   06/06/23  1052        Albumin/Globulin Ratio   1.0             ABO and RH               Albumin   2.3             Alkaline Phosphatase   65             ALT   15             AST   22             Baso # 0.03               Basophil % 0.4               BILIRUBIN TOTAL   0.3             BUN   24.2             Calcium   7.5             Chloride   103             CO2   24             Creatinine   1.16             eGFR   48             Eos # 0.19               Eosinophil % 2.7               Globulin, Total   2.4             Glucose   107             Hematocrit 23.3               Hemoglobin 7.7               Immature Grans (Abs) 0.04               Immature Granulocytes 0.6               Lymph # 2.25               LYMPH % 31.8               MCH 30.7               MCHC 33.0               MCV 92.8               Mono # 0.77               Mono % 10.9               MPV 9.1               Neut # 3.80               Neut % 53.6               nRBC 0.0               Platelets 210               POCT Glucose     162   177   212       " Potassium   4.6             PROTEIN TOTAL   4.7             RBC 2.51               RDW 12.6               Sodium   133             WBC 7.08                                       Significant Imaging: I have reviewed and interpreted all pertinent imaging results/findings.     Assessment/Plan:     Active Diagnoses:    Diagnosis Date Noted POA    PRINCIPAL PROBLEM:  Other fracture of left femur, initial encounter for closed fracture [S72.8X2A] 06/05/2023 Unknown      Problems Resolved During this Admission:   H&H down to 7.7 this morning. Asymptomatic. Continue to monitor.   WBAT and ROMAT LLE.   Daily dry dressing changes beginning today as needed  Work with therapy on mobilizing today. Likely DC to rehab facility   Lovenox for DVT ppx during stay. Okay for Xarelto 10 mg once daily upon discharge for DVT ppx.  Follow up with Dr. Del Cid in approx 3 weeks for wound check and repeat x rays.   Will continue to monitor through the acute post operative period.       The above findings, diagnostics, and treatment plan were discussed with Dr. Del Cid who is in agreement with the plan of care except as stated in additional documentation.      Che Butterfield PA-C  Orthopedic Trauma Surgery  [facility]

## 2023-06-07 NOTE — PROGRESS NOTES
"Inpatient Nutrition Evaluation    Admit Date: 6/4/2023   Total duration of encounter: 3 days    Nutrition Recommendation/Prescription     - continue diabetic diet    Nutrition Assessment     Chart Review    Reason Seen: continuous nutrition monitoring    Malnutrition Screening Tool Results                Diagnosis:  Closed left intertrochanteric femoral fracture status post intramedullary nailing 6/5, postop day 2.   Mechanical fall leading to above   Acute postop blood-loss anemia -worse today  Mild acute kidney injury -improved    Relevant Medical History:  HTN, HLD, chronic kidney disease stage IIIA, CAD status post PCI, type 2 diabetes mellitus, hypothyroidism     Nutrition-Related Medications: docusate sodium, glimepiride    Nutrition-Related Labs:  6/7: Na 133, BUN 24.2, Crea1.16, GFR 48, Ca7.5    Diet Order: Diet diabetic  Oral Supplement Order: none  Appetite/Oral Intake: fair/50-75% of meals  Factors Affecting Nutritional Intake: none identified  Food/Yarsanism/Cultural Preferences: none reported  Food Allergies: no known food allergies    Skin Integrity: incision  Wound(s):       Comments    6/7 pt reports good appetite at home, currently eating fair; no unintentional weight loss; no GI concerns    Anthropometrics    Height: 5' 6" (167.6 cm) Height Method: Stated  Last Weight: 59 kg (130 lb) (06/04/23 2254) Weight Method: Stated  BMI (Calculated): 21  BMI Classification: normal (BMI 18.5-24.9)     Ideal Body Weight (IBW), Female: 130 lb     % Ideal Body Weight, Female (lb): 100 %                             Usual Weight Provided By: patient denies unintentional weight loss    Wt Readings from Last 5 Encounters:   06/04/23 59 kg (130 lb)   04/22/20 58.7 kg (129 lb 6.6 oz)     Weight Change(s) Since Admission:  Admit Weight: 59 kg (130 lb) (06/04/23 2254)      Patient Education    Not applicable.    Monitoring & Evaluation     Dietitian will monitor food and beverage intake.  Nutrition Risk/Follow-Up: low " (follow-up in 5-7 days)  Patients assigned 'low nutrition risk' status do not qualify for a full nutritional assessment but will be monitored and re-evaluated in a 5-7 day time period. Please consult if re-evaluation needed sooner.

## 2023-06-07 NOTE — PT/OT/SLP PROGRESS
Occupational Therapy   Treatment    Name: Vickie Rahman  MRN: [id number]  Admitting Diagnosis:   Fall: L femur intertrochanteric fx, s/p IMN 2 Days Post-Op    Recommendations:     Discharge Recommendations: rehab  Discharge Equipment Recommendations:  walker, rolling  Barriers to discharge:  None    Assessment:     Vickie Rahman is a 78 y.o. female with a medical diagnosis of Fall: L femur intertrochanteric fx, s/p IMN . Performance deficits affecting function are weakness, impaired functional mobility, impaired endurance, impaired self care skills, pain. Pt was found in bed eating banana upon entrance to room. Pt appeared motivated to participate in therapy today. Pt would benefit from  services upon d/c.      Rehab Prognosis:  Good; patient would benefit from acute skilled OT services to address these deficits and reach maximum level of function.       Plan:     Patient to be seen 6 x/week to address the above listed problems via self-care/home management, therapeutic activities, therapeutic exercises  Plan of Care Expires: 06/20/23  Plan of Care Reviewed with: patient    Subjective     Pain/Comfort:  Pain Rating 1: 0/10    Objective:     Patient found HOB elevated with telemetry upon OT entry to room.    General Precautions: Standard, fall    Orthopedic Precautions:N/A  Braces: N/A  Respiratory Status: Nasal cannula, flow 1 L/min     Occupational Performance:     Bed Mobility:    Patient completed Rolling/Turning to Left with  minimum assistance  Patient completed Scooting/Bridging with minimum assistance  Patient completed Supine to Sit with minimum assistance  Patient completed Sit to Supine with minimum assistance   Pt required min A for bed mobility to guide LLE.     Functional Mobility/Transfers:  Patient completed Sit <> Stand Transfer with moderate assistance  with  rolling walker   Patient completed Bed <> Chair Transfer using Step Transfer technique with moderate assistance with rolling walker  Functional  Mobility: Pt demonstrated pivoting movements of BLE when transferring to chair. Pt educated to try taking steps and moving ankles. Pt required mod A for STS and transfer to chair with rolling walker.     Activities of Daily Living:  Grooming: stand by assistance Pt completed hair combing while seated in chair with SBA.  Lower Body Dressing: modified independence Pt completed donning/doffing socks with use of sock aide and reacher while seated in chair.       Therapeutic Positioning    OT interventions performed during the course of today's session in an effort to prevent and/or reduce acquired pressure injuries:   Therapeutic positioning completed       Patient Education:  Patient provided with verbal education regarding OT role/goals/POC, post op precautions, fall prevention, safety awareness, and Discharge/DME recommendations.  Understanding was verbalized.      Patient left up in chair with all lines intact and call button in reach    GOALS:   Multidisciplinary Problems       Occupational Therapy Goals          Problem: Occupational Therapy    Goal Priority Disciplines Outcome Interventions   Occupational Therapy Goal     OT, PT/OT Ongoing, Progressing    Description: Goals to be met by: 6/20/23     Patient will increase functional independence with ADLs by performing:    UE Dressing with Modified [place].  LE Dressing with Modified [place].  Grooming while standing at sink with Modified [place].  Toileting from toilet with Modified [place] for hygiene and clothing management.   Toilet transfer to toilet with Modified [place].                         Time Tracking:     OT Date of Treatment: 06/07/23  OT Start Time: 1109  OT Stop Time: 1132  OT Total Time (min): 23 min    Billable Minutes:Self Care/Home Management 2 units    OT/TAMMY: OT     Number of TAMMY visits since last OT visit: 0    6/7/2023

## 2023-06-07 NOTE — PLAN OF CARE
Accepted [facility] Dr Clarence Ramirez. They will call nurse for report and pt is in ambulance will call. Therapy confirms ambulance is needed as pt has not been able to sit up just for a brief period due to pain.   Nursing will let me know when [facility] calls her for report and we will notify ambulance

## 2023-06-07 NOTE — PROGRESS NOTES
[facility]        Chief Complaint: Inpatient Follow-up    HPI:   78-year-old female with significant history of HTN, HLD, chronic kidney disease stage IIIA, CAD status post PCI, type 2 diabetes mellitus, hypothyroidism presented to the ED complaining of left hip pain after sustaining a ground level fall.  Patient tripped and fell while walking up the stairs in her house landing on her left hip on cement.  At baseline patient is able to ambulate independently and is independent of all activities of daily living paid patient was afebrile, hemodynamically stable in the ED.  Imaging revealed left proximal femoral fracture.  Orthopedics consulted and they planned to take her to OR on 6/5.  Patient was taken to OR on 06/05 and she underwent intramedullary nailing.  Postop recommendations per Orthopedics, on multimodal pain control.  Closely monitoring labs.  Postop anemia noted, but more than 9 on 06/06.       Interval Hx:   Patient seen at bedside.  Patient is comfortably laying in bed.  Pain is under control . hemodynamics stable today morning.  Had few episodes of borderline hypotension with systolic dropping to 90s.    Objective/physical exam:  General: In no acute distress, afebrile  Chest: Clear to auscultation bilaterally  Heart: S1, S2, no appreciable murmur  Abdomen: Soft, nontender, BS +  MSK: Warm, no lower extremity edema, no clubbing or cyanosis  Neurologic: Alert and oriented x4,   VITAL SIGNS: 24 HRS MIN & MAX LAST   Temp  Min: 97.7 °F (36.5 °C)  Max: 98.7 °F (37.1 °C) 97.8 °F (36.6 °C)   BP  Min: 91/53  Max: 111/74 110/62   Pulse  Min: 86  Max: 108  87   Resp  Min: 18  Max: 20 18   SpO2  Min: 92 %  Max: 98 % 98 %       Recent Labs   Lab 06/07/23  0406   WBC 7.08   RBC 2.51*   HGB 7.7*   HCT 23.3*   MCV 92.8   MCH 30.7   MCHC 33.0   RDW 12.6      MPV 9.1         Recent Labs   Lab 06/07/23  0405   *   K 4.6   CO2 24   BUN 24.2*    CREATININE 1.16*   CALCIUM 7.5*   ALBUMIN 2.3*   ALKPHOS 65   ALT 15   AST 22   BILITOT 0.3          Microbiology Results (last 7 days)       ** No results found for the last 168 hours. **             Scheduled Med:   amitriptyline  25 mg Oral QHS    docusate sodium  100 mg Oral Daily    enoxparin  40 mg Subcutaneous Q24H (prophylaxis, 1700)    glimepiride  2 mg Oral BID AC    losartan  100 mg Oral Daily    And    hydroCHLOROthiazide  12.5 mg Oral Daily    isosorbide mononitrate  30 mg Oral Daily    ketorolac  15 mg Intravenous Q6H    levothyroxine  88 mcg Oral QAM    methocarbamoL  500 mg Oral TID    metoprolol succinate  25 mg Oral Daily    pravastatin  40 mg Oral QHS          Assessment/Plan:    Closed left intertrochanteric femoral fracture status post intramedullary nailing 6/5, postop day 2.   Mechanical fall leading to above   Acute postop blood-loss anemia -worse today  Mild acute kidney injury -improved  History of CAD status post PCI  Essential HTN-stable   HLD  Type 2 diabetes mellitus with hyperglycemia -improving  LEONOR on CKD stage IIIA  Prophylaxis    Postop recommendations per Orthopedics  Continue multimodal pain control   Continue PT/OT   Continue bowel regimen   Hemoglobin dropped to less than 8 today and therefore I will transfuse with 2 units PRBC  Found him on IV fluids, DC IV fluids   Noted mild hyponatremia   I will hold losartan HCTZ for today   Patient is also on Imdur, Toprol-XL, will be cautious given intermittent borderline hypotension, hold for blood pressure less than 100/60  CBG is better on Sliding scale for diabetes mellitus and home glimepiride 2 mg b.i.d.  Continue other home meds-statin, amitriptyline, levothyroxine   DVT prophylaxis-Lovenox      Critical care time-35 minutes  Critical care diagnosis-acute postop blood-loss anemia requiring PRBC transfusion  Critical care interventions: Hands-on evaluation, review of labs/radiographs/records and discussions with patient      Case  management consult for inpatient rehab    Yeny Dumont MD   06/07/2023

## 2023-06-07 NOTE — PT/OT/SLP PROGRESS
Physical Therapy Treatment    Patient Name:  Vickie Rahman   MRN:  [id number]    Recommendations:     Discharge Recommendations: rehabilitation facility  Discharge Equipment Recommendations: walker, rolling  Barriers to discharge: Impaired mobility    Assessment:     Vickie Rahman is a 78 y.o. female admitted with a medical diagnosis of Other fracture of left femur, initial encounter for closed fracture, s/p IMN. She presents with the following impairments/functional limitations: weakness, impaired endurance, impaired functional mobility, gait instability, impaired balance, pain. Patient tolerated PT treatment well, progressing towards functional PT goals.     Rehab Prognosis: Good; patient would benefit from acute skilled PT services to address these deficits and reach maximum level of function.    Recent Surgery: Procedure(s) (LRB):  INSERTION, INTRAMEDULLARY JAMAL, FEMUR (Left) 2 Days Post-Op    Plan:     During this hospitalization, patient to be seen 6 x/week to address the identified rehab impairments via gait training, therapeutic activities, therapeutic exercises, neuromuscular re-education and progress toward the following goals:    Plan of Care Expires:  06/30/23    Subjective     Chief Complaint: none stated  Patient/Family Comments/goals: to get stronger  Pain/Comfort:  Pain Rating 1: 0/10      Objective:     Communicated with RN prior to session.  Patient found up in chair with peripheral IV upon PT entry to room.     General Precautions: Standard, fall  Orthopedic Precautions: LLE weight bearing as tolerated (LLE ROM as tolerated)  Braces: N/A  Respiratory Status: Room air  Skin Integrity: Visible skin intact    Therapeutic Activities/Exercises:  Patient found up in chair. Patient performed sit<>stand w/ modA and RW. Patient ambulated 4ft from chair to toilet, performed toilet transfer with modA. Patient ambulated 10ft with modA, RW, and step-to gait pattern. Patient performed sit<>stand and stand pivot  transfer from chair to bed with modA, cues for safety and technique. Pt returned to supine with HOB elevated with modA for positioning.     Education:  Patient provided with verbal education regarding PT POC, safety with mobility.  Understanding was verbalized.     Patient left HOB elevated with all lines intact, call button in reach, and RN notified.    GOALS:   Multidisciplinary Problems       Physical Therapy Goals          Problem: Physical Therapy    Goal Priority Disciplines Outcome Goal Variances Interventions   Physical Therapy Goal     PT, PT/OT Ongoing, Progressing     Description: Goals to be met by: 23     Patient will increase functional independence with mobility by performin. Supine to sit with Modified [place]  2. Sit to stand transfer with Stand-by Assistance  3. Gait  x 100 feet with Stand-by Assistance using Rolling Walker.                          Time Tracking:     PT Received On: 23  PT Start Time: 1245     PT Stop Time: 1308  PT Total Time (min): 23 min     Billable Minutes: Therapeutic Activity 23min    Treatment Type: Treatment  PT/PTA: PT     Number of PTA visits since last PT visit: 1     2023

## 2023-06-08 VITALS
WEIGHT: 130 LBS | HEART RATE: 90 BPM | OXYGEN SATURATION: 98 % | TEMPERATURE: 98 F | HEIGHT: 66 IN | DIASTOLIC BLOOD PRESSURE: 70 MMHG | SYSTOLIC BLOOD PRESSURE: 122 MMHG | RESPIRATION RATE: 20 BRPM | BODY MASS INDEX: 20.89 KG/M2

## 2023-06-08 LAB
ALBUMIN SERPL-MCNC: 2.1 G/DL (ref 3.4–4.8)
ALBUMIN/GLOB SERPL: 0.8 RATIO (ref 1.1–2)
ALP SERPL-CCNC: 95 UNIT/L (ref 40–150)
ALT SERPL-CCNC: 36 UNIT/L (ref 0–55)
AST SERPL-CCNC: 33 UNIT/L (ref 5–34)
BASOPHILS # BLD AUTO: 0.03 X10(3)/MCL
BASOPHILS NFR BLD AUTO: 0.5 %
BILIRUBIN DIRECT+TOT PNL SERPL-MCNC: 0.6 MG/DL
BUN SERPL-MCNC: 16.3 MG/DL (ref 9.8–20.1)
CALCIUM SERPL-MCNC: 7.5 MG/DL (ref 8.4–10.2)
CHLORIDE SERPL-SCNC: 104 MMOL/L (ref 98–107)
CO2 SERPL-SCNC: 25 MMOL/L (ref 23–31)
CREAT SERPL-MCNC: 0.94 MG/DL (ref 0.55–1.02)
EOSINOPHIL # BLD AUTO: 0.22 X10(3)/MCL (ref 0–0.9)
EOSINOPHIL NFR BLD AUTO: 3.8 %
ERYTHROCYTE [DISTWIDTH] IN BLOOD BY AUTOMATED COUNT: 13.5 % (ref 11.5–17)
GFR SERPLBLD CREATININE-BSD FMLA CKD-EPI: >60 MLS/MIN/1.73/M2
GLOBULIN SER-MCNC: 2.5 GM/DL (ref 2.4–3.5)
GLUCOSE SERPL-MCNC: 101 MG/DL (ref 82–115)
HCT VFR BLD AUTO: 29.8 % (ref 37–47)
HGB BLD-MCNC: 10.3 G/DL (ref 12–16)
IMM GRANULOCYTES # BLD AUTO: 0.05 X10(3)/MCL (ref 0–0.04)
IMM GRANULOCYTES NFR BLD AUTO: 0.9 %
LYMPHOCYTES # BLD AUTO: 1.89 X10(3)/MCL (ref 0.6–4.6)
LYMPHOCYTES NFR BLD AUTO: 33 %
MCH RBC QN AUTO: 31.3 PG (ref 27–31)
MCHC RBC AUTO-ENTMCNC: 34.6 G/DL (ref 33–36)
MCV RBC AUTO: 90.6 FL (ref 80–94)
MONOCYTES # BLD AUTO: 0.44 X10(3)/MCL (ref 0.1–1.3)
MONOCYTES NFR BLD AUTO: 7.7 %
NEUTROPHILS # BLD AUTO: 3.09 X10(3)/MCL (ref 2.1–9.2)
NEUTROPHILS NFR BLD AUTO: 54.1 %
NRBC BLD AUTO-RTO: 0 %
PLATELET # BLD AUTO: 227 X10(3)/MCL (ref 130–400)
PMV BLD AUTO: 8.4 FL (ref 7.4–10.4)
POTASSIUM SERPL-SCNC: 4.2 MMOL/L (ref 3.5–5.1)
PROT SERPL-MCNC: 4.6 GM/DL (ref 5.8–7.6)
RBC # BLD AUTO: 3.29 X10(6)/MCL (ref 4.2–5.4)
SODIUM SERPL-SCNC: 134 MMOL/L (ref 136–145)
WBC # SPEC AUTO: 5.72 X10(3)/MCL (ref 4.5–11.5)

## 2023-06-08 PROCEDURE — 97530 THERAPEUTIC ACTIVITIES: CPT | Mod: CO

## 2023-06-08 PROCEDURE — 85025 COMPLETE CBC W/AUTO DIFF WBC: CPT | Performed by: PHYSICIAN ASSISTANT

## 2023-06-08 PROCEDURE — 25000003 PHARM REV CODE 250: Performed by: INTERNAL MEDICINE

## 2023-06-08 PROCEDURE — 80053 COMPREHEN METABOLIC PANEL: CPT | Performed by: PHYSICIAN ASSISTANT

## 2023-06-08 PROCEDURE — 63600175 PHARM REV CODE 636 W HCPCS: Performed by: INTERNAL MEDICINE

## 2023-06-08 PROCEDURE — 97530 THERAPEUTIC ACTIVITIES: CPT | Mod: CQ

## 2023-06-08 PROCEDURE — 25000003 PHARM REV CODE 250: Performed by: PHYSICIAN ASSISTANT

## 2023-06-08 PROCEDURE — 27000221 HC OXYGEN, UP TO 24 HOURS

## 2023-06-08 RX ORDER — ISOSORBIDE MONONITRATE 30 MG/1
30 TABLET, EXTENDED RELEASE ORAL DAILY
Start: 2023-06-08

## 2023-06-08 RX ORDER — AMOXICILLIN 250 MG
2 CAPSULE ORAL NIGHTLY
Status: DISCONTINUED | OUTPATIENT
Start: 2023-06-08 | End: 2023-06-08 | Stop reason: HOSPADM

## 2023-06-08 RX ORDER — METHOCARBAMOL 500 MG/1
500 TABLET, FILM COATED ORAL 3 TIMES DAILY
Qty: 30 TABLET | Refills: 0
Start: 2023-06-08 | End: 2023-06-18

## 2023-06-08 RX ORDER — METOPROLOL SUCCINATE 25 MG/1
25 TABLET, EXTENDED RELEASE ORAL DAILY
Start: 2023-06-08

## 2023-06-08 RX ORDER — PRAVASTATIN SODIUM 40 MG/1
40 TABLET ORAL DAILY
Start: 2023-06-08

## 2023-06-08 RX ORDER — AMITRIPTYLINE HYDROCHLORIDE 25 MG/1
25 TABLET, FILM COATED ORAL NIGHTLY
Start: 2023-06-08

## 2023-06-08 RX ADMIN — OXYCODONE HYDROCHLORIDE 5 MG: 5 TABLET ORAL at 11:06

## 2023-06-08 RX ADMIN — DOCUSATE SODIUM 100 MG: 100 CAPSULE, LIQUID FILLED ORAL at 08:06

## 2023-06-08 RX ADMIN — METHOCARBAMOL 500 MG: 500 TABLET ORAL at 08:06

## 2023-06-08 RX ADMIN — METOPROLOL SUCCINATE 25 MG: 25 TABLET, EXTENDED RELEASE ORAL at 08:06

## 2023-06-08 RX ADMIN — GLIMEPIRIDE 2 MG: 1 TABLET ORAL at 06:06

## 2023-06-08 RX ADMIN — ISOSORBIDE MONONITRATE 30 MG: 30 TABLET, EXTENDED RELEASE ORAL at 08:06

## 2023-06-08 RX ADMIN — KETOROLAC TROMETHAMINE 15 MG: 30 INJECTION, SOLUTION INTRAMUSCULAR; INTRAVENOUS at 11:06

## 2023-06-08 RX ADMIN — LEVOTHYROXINE SODIUM 88 MCG: 88 TABLET ORAL at 06:06

## 2023-06-08 NOTE — NURSING
Patient to [facility] per [facility] staff via stretcher. No complaints voiced no distress noted patient denies pain discomfort SOB

## 2023-06-08 NOTE — PT/OT/SLP PROGRESS
Physical Therapy Treatment    Patient Name:  Vickie Rahman   MRN:  [id number]    Recommendations:     Discharge Recommendations: rehabilitation facility  Discharge Equipment Recommendations: walker, rolling  Barriers to discharge: Impaired mobility    Assessment:     Vickie Rahman is a 78 y.o. female admitted with a medical diagnosis of Other fracture of left femur, initial encounter for closed fracture.  She presents with the following impairments/functional limitations: weakness, impaired endurance, impaired functional mobility, gait instability, impaired balance, pain .    Rehab Prognosis: Good; patient would benefit from acute skilled PT services to address these deficits and reach maximum level of function.    Recent Surgery: Procedure(s) (LRB):  INSERTION, INTRAMEDULLARY JAMAL, FEMUR (Left) 3 Days Post-Op    Plan:     During this hospitalization, patient to be seen 6 x/week to address the identified rehab impairments via gait training, therapeutic activities, therapeutic exercises, neuromuscular re-education and progress toward the following goals:    Plan of Care Expires:  06/30/23    Subjective     Chief Complaint: pain in LLE  Patient/Family Comments/goals:   Pain/Comfort:  Pain Rating 1:  (began to c/o pain with mobility)  Location - Side 1: Left  Location 1: leg  Pain Addressed 1: Reposition, Distraction      Objective:     Communicated with RN prior to session.  Patient found HOB elevated with   upon PT entry to room.     General Precautions: Standard, fall  Orthopedic Precautions: LLE weight bearing as tolerated (LLE ROM as tolerated)  Braces: N/A  Respiratory Status: Nasal cannula, flow 2 L/min  Skin Integrity: Visible skin intact      Functional Mobility:  Bed Mobility:     Supine to Sit: maximal assistance  Transfers:     Sit to Stand:  moderate assistance with rolling walker  Gait: pt attempted to take steps during transfers however was not able to clear feet from floor to take step.     Therapeutic  Activities/Exercises:  Stand pivot from bed to BSC with RW, MIN A. (+) Voiding noted. Sit to stand activity to assist with pericare.   Pt attempted to take steps toward the bed however only able to pivot > bed. Attempted lateral steps near EOB.   Stand pivot from bed to chair with RW, MIN A.   Educated pt to tolerate at least 2hr sitting UIC with TAMMY to put back to bed.     Education:  Patient provided with verbal education regarding pt POC and skin breakdown prevention.  Understanding was verbalized.     Patient left up in chair with all lines intact, call button in reach, and TAMMY notified..    GOALS:   Multidisciplinary Problems       Physical Therapy Goals          Problem: Physical Therapy    Goal Priority Disciplines Outcome Goal Variances Interventions   Physical Therapy Goal     PT, PT/OT Ongoing, Progressing     Description: Goals to be met by: 23     Patient will increase functional independence with mobility by performin. Supine to sit with Modified [place]  2. Sit to stand transfer with Stand-by Assistance  3. Gait  x 100 feet with Stand-by Assistance using Rolling Walker.                          Time Tracking:     PT Received On:    PT Start Time: 1009     PT Stop Time: 1026  PT Total Time (min): 17 min     Billable Minutes: Therapeutic Activity 17    Treatment Type: Treatment  PT/PTA: PTA     Number of PTA visits since last PT visit: 2     2023

## 2023-06-08 NOTE — PT/OT/SLP PROGRESS
Occupational Therapy   Treatment    Name: Vickie Rahman  MRN: [id number]  Admitting Diagnosis:  Other fracture of left femur, initial encounter for closed fracture  3 Days Post-Op    Recommendations:     Discharge Recommendations: rehabilitation facility  Discharge Equipment Recommendations:  walker, rolling  Barriers to discharge:       Assessment:     Vickie Rahman is a 78 y.o. female with a medical diagnosis of Other fracture of left femur, initial encounter for closed fracture.  She presents with pain while sitting UIC. Offered to notify RN for pain meds, reluctant but agreeable. RN notified. Performance deficits affecting function are weakness, gait instability, impaired endurance, decreased lower extremity function, impaired self care skills, impaired functional mobility, pain, orthopedic precautions, decreased ROM.     Rehab Prognosis:  Good; patient would benefit from acute skilled OT services to address these deficits and reach maximum level of function.       Plan:     Patient to be seen 6 x/week to address the above listed problems via self-care/home management, therapeutic activities, therapeutic exercises  Plan of Care Expires: 06/20/23  Plan of Care Reviewed with: patient    Subjective     Pain/Comfort:  Location - Side 1: Left  Location 1: leg  Pain Addressed 1: Reposition, Distraction    Objective:     Communicated with: Patient found up in chair with peripheral IV upon OT entry to room.    General Precautions: Standard, fall    Orthopedic Precautions:N/A  Braces: N/A  Respiratory Status: Room air  Vital Signs: Respiratory Status: on room air     Occupational Performance:     Bed Mobility:    Patient completed Sit to Supine with moderate assistance 2/2 pain    Functional Mobility/Transfers:  Patient completed Bed <> Chair Transfer using Stand Pivot technique with minimum assistance with rolling walker. Cues for proper clearance and advancement of BLE.       Therapeutic Positioning    OT interventions  "performed during the course of today's session in an effort to prevent and/or reduce acquired pressure injuries:   Therapeutic positioning completed     Skin assessment: all bony prominences were assessed    Findings: no redness or breakdown noted    [facility] 6 Click ADL:      Patient Education:  Patient provided with verbal education regarding pressure ulcer prevention.   Declined positioning, reporting that it feels "uncomfortable." Also declined floating heels with pillows.      Patient left HOB elevated with all lines intact and call button in reach    GOALS:   Multidisciplinary Problems       Occupational Therapy Goals          Problem: Occupational Therapy    Goal Priority Disciplines Outcome Interventions   Occupational Therapy Goal     OT, PT/OT Ongoing, Progressing    Description: Goals to be met by: 6/20/23     Patient will increase functional independence with ADLs by performing:    UE Dressing with Modified [place].  LE Dressing with Modified [place].  Grooming while standing at sink with Modified [place].  Toileting from toilet with Modified [place] for hygiene and clothing management.   Toilet transfer to toilet with Modified [place].                         Time Tracking:     OT Date of Treatment: 06/08/23  OT Start Time: 1129  OT Stop Time: 1140  OT Total Time (min): 11 min    Billable Minutes:Therapeutic Activity 11    OT/TAMMY: TAMMY     Number of TAMMY visits since last OT visit: 1 6/8/2023     "

## 2023-06-08 NOTE — PLAN OF CARE
Donavan thakurab /Dr Deric Siu have accepted pt for today.  aware and will do dc order and med rec.

## 2023-06-08 NOTE — PROGRESS NOTES
[facility] MEDICINE  PROGRESS NOTE        CHIEF COMPLAINT   Hospital follow up    HOSPITAL COURSE   78-year-old female with significant history of HTN, HLD, chronic kidney disease stage IIIA, CAD status post PCI, type 2 diabetes mellitus, hypothyroidism presented to the ED complaining of left hip pain after sustaining a ground level fall.  Patient tripped and fell while walking up the stairs in her house landing on her left hip on cement.  At baseline patient is able to ambulate independently and is independent of all activities of daily living paid patient was afebrile, hemodynamically stable in the ED.  Imaging revealed left proximal femoral fracture.  Orthopedics consulted and they planned to take her to OR on 6/5.  Patient was taken to OR on 06/05 and she underwent intramedullary nailing.  Postop recommendations per Orthopedics, on multimodal pain control.  Closely monitoring labs.  Postop anemia noted, but more than 9 on 06/06.     Today  Seen and examined this morning.  No issues at this time.  Remains on slight supplemental oxygen.  Encouraged her to do incentive spirometry.        OBJECTIVE/PHYSICAL EXAM     VITAL SIGNS (MOST RECENT):  Temp: 98.2 °F (36.8 °C) (06/08/23 0646)  Pulse: 84 (06/08/23 0646)  Resp: 18 (06/08/23 0646)  BP: (!) 164/80 (06/08/23 0646)  SpO2: 96 % (06/08/23 0646) VITAL SIGNS (24 HOUR RANGE):  Temp:  [97.8 °F (36.6 °C)-99.6 °F (37.6 °C)] 98.2 °F (36.8 °C)  Pulse:  [] 84  Resp:  [16-19] 18  SpO2:  [93 %-97 %] 96 %  BP: (112-164)/(62-80) 164/80   GENERAL: In no acute distress, afebrile  HEENT:  CHEST: Clear to auscultation bilaterally  HEART: S1, S2, no appreciable murmur  ABDOMEN: Soft, nontender, BS +  MSK: Warm, no lower extremity edema, no clubbing or cyanosis  NEUROLOGIC: Alert and oriented x4, moving all extremities with good strength   INTEGUMENTARY:  PSYCHIATRY:        ASSESSMENT/PLAN   Closed left intertrochanteric femur  fracture-status post intramedullary nailing June 5   Mechanical fall   Postoperative anemia requiring blood transfusion   Mild acute kidney injury-resolved    History of:  CAD PCI stent, HTN, HLD, dm 2, CKD 3 a      Orthopedic surgery following.  Follow-up with Dr. Del Cid in 3 weeks.  Okay for Xarelto 10 upon discharge.  Continue PT OT  Wean oxygen  Case management following.  Pending placement.    DVT prophylaxis:  Lovenox 40    Anticipated discharge and disposition:   __________________________________________________________________________    LABS/MICRO/MEDS/DIAGNOSTICS       LABS  Recent Labs     06/08/23  0654   *   K 4.2   CHLORIDE 104   CO2 25   BUN 16.3   CREATININE 0.94   GLUCOSE 101   CALCIUM 7.5*   ALKPHOS 95   AST 33   ALT 36   ALBUMIN 2.1*     Recent Labs     06/08/23  0654   WBC 5.72   RBC 3.29*   HCT 29.8*   MCV 90.6          MICROBIOLOGY  Microbiology Results (last 7 days)       ** No results found for the last 168 hours. **               MEDICATIONS   amitriptyline  25 mg Oral QHS    docusate sodium  100 mg Oral Daily    enoxparin  40 mg Subcutaneous Q24H (prophylaxis, 1700)    glimepiride  2 mg Oral BID AC    isosorbide mononitrate  30 mg Oral Daily    ketorolac  15 mg Intravenous Q6H    levothyroxine  88 mcg Oral QAM    methocarbamoL  500 mg Oral TID    metoprolol succinate  25 mg Oral Daily    pravastatin  40 mg Oral QHS         INFUSIONS         DIAGNOSTIC TESTS  X-Ray Femur 2 View Left   Final Result      X-Ray Femur 2 View Left   Final Result      As above.         Electronically signed by: Zachary Damon   Date:    06/05/2023   Time:    07:20      X-Ray Chest AP Portable   Final Result      No acute cardiopulmonary process.         Electronically signed by: Zachary Damon   Date:    06/05/2023   Time:    06:35      X-Ray Hip 2 or 3 views Left (with Pelvis when performed)   Final Result      Intertrochanteric left femoral fracture.         Electronically signed by: Latasha Hudson    Date:    06/05/2023   Time:    09:57           No results found for: EF         Case related differential diagnoses have been reviewed; assessment and plan has been documented. I have personally reviewed the labs and test results that are currently available; I have reviewed the patients medication list. I have reviewed the consulting providers recommendations. I have reviewed or attempted to review medical records based upon their availability.  All of the patient's and/or family's questions have been addressed and answered to the best of my ability.  I will continue to monitor closely and make adjustments to medical management as needed.  This document was created using M*Modal Fluency Direct.  Transcription errors may have been made.  Please contact me if any questions may rise regarding documentation to clarify transcription.        Nash Carrillo MD   Internal Medicine  Department of Hospital Medicine [facility] - [facility]

## 2023-06-14 NOTE — PHYSICIAN QUERY
PT Name: Vickie Rahman  MR #: [id number]     DOCUMENTATION CLARIFICATION     CDS/: Leatha Aalrcon RN        Contact information: [email or web address]  This form is a permanent document in the medical record.    Query Date: June 14, 2023    By submitting this query, we are merely seeking further clarification of documentation.  Please utilize your independent clinical judgment when addressing the question(s) below.    The Medical Record contains the following:   Indicator Supporting Clinical Findings Location in Medical Record    Kidney (Renal) Insufficiency     x Kidney (Renal) Failure/Injury  Mild LEONOR  6/7 HM PN    Nephrotoxic Agents     x BUN/Creatinine           GFR  6/5  BUN/Creatinine- 18.3/1.02   GFR - 56    6/6  BUN/Creatinine  - 20.3/1.20   GFR - 46    6/7  BUN/Creatinine  - 24.2/1.16  GFR - 48   Labs    Urine: Casts         Eosinophils      Urine Output      Dehydration      Nausea/Vomiting      Dialysis/CRRT      Treatment     x Other  Mrs. Rahman is a 77 yo female with pmhx HTN, HLD, DM2, CAD/stent 7 yrs ago on ASA 81mg daily who presents to the ED with c/o left hip pain after sustaining a ground level fall, pt states she was walking up the stairs to her house when she tripped and felling landing on her left hip on the cement, no head trauma or LOC.      Pdx - Left intertrochanteric femur fracture     Procedures  6/5  Treatment left intertrochanteric femur fracture with intramedullary nail  6/8  PN       [facility] approved diagnostic criteria for acute kidney injury is based on KDIGO criteria:    An increase in serum creatinine > 0.3mg/dl within 48 hours  OR  Increase in serum creatinine to > 1.5x baseline, which is known or presumed to have occurred within the prior 7 days  OR  Urine volume <0.5 ml/kg/hr for 6 hours       The clinical guidelines noted above are only a system guideline. It does not replace the providers clinical judgment.     Provider, please specify the diagnosis  or diagnoses associated with above clinical findings.     [  x  ] Acute Kidney Failure/Injury ruled out      [    ] Other Acute Kidney Failure/Injury (please clarify with clinical indicators): ____________     [    ] Other (please specify): _______________________________       Please document in your progress notes daily for the duration of treatment until resolved and include in your discharge summary.    References:   KD[employer]O Clinical Practice Guideline for Acute Kidney Injury. (2012, March). Retrieved October 21, 2020, from https://kdigo.org/wp-content/uploads/2016/10/JNVFM-6446-GRX-Guideline-English.pdf    TONEY Graves MD, AKASH Gómez MD, & ADELA Almaguer MD. (1960). Renal medullary necrosis [Abstract]. The American Journal of Medicine, 29(1), 132-156. Doi:https://www.sciencedirect.com/science/article/abs/pii/8887879027452415    ADELA Bailey MD, & HELIO Scales MD, MS. (2020, June 18). Definition and staging of chronic kidney disease in adults ([id number] AKASH Najera MD, ScD & [id number] RISHI Cuevas MD, MSc, Eds.). Retrieved October 21, 2020, from https://www.[employer]e/contents/definition-and-staging-of-chronic-kidney-disease-in-adults?search=ckd%20staging&source=search_result&selectedTitle=1~150&usage_type=default&display_rank=1     MALINDA Monaco MD, FACP. (2015, Marjorie 15). Acute kidney injury revisited. Retrieved October 21, 2020, from https://acphospitalist.org/archives/2015/06/coding-acute-kidney-injury.htm    TANNER Valdes MD. (2019, July). Renal Cortical Necrosis. Retrieved October 21, 2020, from [email or web address].com/professional/genitourinary-disorders/renovascular-disorders/renal-cortical-necrosis    Form No. 60650

## 2023-06-26 ENCOUNTER — OFFICE VISIT (OUTPATIENT)
Dept: ORTHOPEDICS | Facility: CLINIC | Age: 78
End: 2023-06-26
Payer: MEDICARE

## 2023-06-26 ENCOUNTER — HOSPITAL ENCOUNTER (OUTPATIENT)
Dept: RADIOLOGY | Facility: CLINIC | Age: 78
Discharge: HOME | End: 2023-06-26
Attending: ORTHOPAEDIC SURGERY
Payer: MEDICARE

## 2023-06-26 VITALS
HEIGHT: 66 IN | DIASTOLIC BLOOD PRESSURE: 75 MMHG | HEART RATE: 95 BPM | BODY MASS INDEX: 20.9 KG/M2 | WEIGHT: 130.06 LBS | SYSTOLIC BLOOD PRESSURE: 143 MMHG | RESPIRATION RATE: 18 BRPM

## 2023-06-26 DIAGNOSIS — S72.8X2D OTHER CLOSED FRACTURE OF LEFT FEMUR WITH ROUTINE HEALING, UNSPECIFIED PORTION OF FEMUR, SUBSEQUENT ENCOUNTER: Primary | ICD-10-CM

## 2023-06-26 DIAGNOSIS — S72.8X2D OTHER CLOSED FRACTURE OF LEFT FEMUR WITH ROUTINE HEALING, UNSPECIFIED PORTION OF FEMUR, SUBSEQUENT ENCOUNTER: ICD-10-CM

## 2023-06-26 PROCEDURE — 73552 XR FEMUR 2 VIEW LEFT: ICD-10-PCS | Mod: LT,,, | Performed by: ORTHOPAEDIC SURGERY

## 2023-06-26 PROCEDURE — 99024 POSTOP FOLLOW-UP VISIT: CPT | Mod: POP,,, | Performed by: PHYSICIAN ASSISTANT

## 2023-06-26 PROCEDURE — 99024 PR POST-OP FOLLOW-UP VISIT: ICD-10-PCS | Mod: POP,,, | Performed by: PHYSICIAN ASSISTANT

## 2023-06-26 PROCEDURE — 73552 X-RAY EXAM OF FEMUR 2/>: CPT | Mod: LT,,, | Performed by: ORTHOPAEDIC SURGERY

## 2023-06-26 NOTE — PROGRESS NOTES
Subjective:       Patient ID: Vickie Rahman is a 78 y.o. female.  Chief Complaint   Patient presents with    Left Hip - Post-op Evaluation     3 week f/u IMN LEFT IT FEMUR FX, AMBULATING WITH WALKER.          HPI    Patient presents for 3 week follow up IMN left IT femur fracture. She is doing very well overall, ambulating without difficulty with use of a walker. She is at home and has home physical therapy coming to the house. 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 I do not think this is unreasonable. States mild pain at night which is uncomfortable, otherwise she has tolerated it well. Denies numbness and tingling distally.     ROS:  Constitutional: Denies fever chills  Eyes: No change in vision  ENT: No ringing or current infections  CV: No chest pain  Resp: No labored breathing  MSK: Pain evident at site of injury located in HPI,   Integ: No signs of abrasions or lacerations  Neuro: No numbness or tingling  Lymphatic: No swelling outside the area of injury     Current Outpatient Medications on File Prior to Visit   Medication Sig Dispense Refill    amitriptyline (ELAVIL) 25 MG tablet Take 1 tablet (25 mg total) by mouth every evening.      glimepiride (AMARYL) 2 MG tablet Take 2 mg by mouth 2 (two) times daily.      isosorbide mononitrate (IMDUR) 30 MG 24 hr tablet Take 1 tablet (30 mg total) by mouth once daily.      losartan-hydrochlorothiazide 100-12.5 mg (HYZAAR) 100-12.5 mg Tab Take 1 tablet by mouth.      metFORMIN (GLUCOPHAGE-XR) 500 MG ER 24hr tablet       metoprolol succinate (TOPROL-XL) 25 MG 24 hr tablet Take 1 tablet (25 mg total) by mouth once daily.      pioglitazone (ACTOS) 15 MG tablet       pravastatin (PRAVACHOL) 40 MG tablet Take 1 tablet (40 mg total) by mouth once daily.      rivaroxaban (XARELTO) 10 mg Tab Take 1 tablet (10 mg total) by mouth daily with dinner or evening meal. For dvt prevention post surgery      SYNTHROID 88 mcg tablet Take 88 mcg by mouth every  "morning.       No current facility-administered medications on file prior to visit.          Objective:      BP (!) 143/75   Pulse 95   Resp 18   Ht 5' 6" (1.676 m)   Wt 59 kg (130 lb 1.1 oz)   BMI 20.99 kg/m²   Physical Exam  General the patient is alert and oriented x3 no acute distress nontoxic-appearing appropriate affect.    Constitutional: Vital signs are examined and stable.  Resp: No signs of labored breathing    Musculoskeletal:     Left lower extremity: Incisions healing well without erythema, drainage, signs of dehiscence; staples removed today without complication; compartments are soft and compressible; No pain with ROM at the hip, knee, or ankle; appropriate tenderness to palpation; dorsi/plantar flexes the foot; SILT distally; BCR distally; DP pulse 2+      Body mass index is 20.99 kg/m².  Ideal body weight: 59.3 kg (130 lb 11.7 oz)  Hemoglobin A1c   Date Value Ref Range Status   06/09/2023 6.5 (H) 4.0 - 6.0 % Final     Hgb   Date Value Ref Range Status   06/13/2023 9.7 (L) 11.8 - 16.0 g/dL Final   06/09/2023 9.9 (L) 11.8 - 16.0 g/dL Final     Hct   Date Value Ref Range Status   06/13/2023 28.1 (L) 36.0 - 48.0 % Final   06/09/2023 28.7 (L) 36.0 - 48.0 % Final     No results found for: IRON  No components found for: FROLATE  No results found for: [id number]  WBC   Date Value Ref Range Status   06/13/2023 7.54 4.00 - 11.50 x10(3)/mcL Final   06/09/2023 6.42 4.00 - 11.50 x10(3)/mcL Final       Radiology: 3 view x ray left femur: hardware intact without signs of loosening or failure. No bony consolidation as expected in the acute post operative period. Fracture alignment well maintained, no significant collapse        Assessment:         1. Other closed fracture of left femur with routine healing, unspecified portion of femur, subsequent encounter  X-Ray Femur 2 View Left              Plan:         Follow up in about 2 months (around 8/26/2023), or if symptoms worsen or fail to improve.    Vickie was " seen today for post-op evaluation.    Diagnoses and all orders for this visit:    Other closed fracture of left femur with routine healing, unspecified portion of femur, subsequent encounter  -     X-Ray Femur 2 View Left; Future        -Continue to mobilize with home physical therapy. WBAT and ROMAT left hip. Continue to use walker as needed for balance. 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 Mobilize often.  - May shower and wet incisions, pat dry, no ointments or creams.   - Follow up in approx 6-8 weeks for repeat x rays and evaluation. Call for outpatient therapy order as needed as she transitions out of home therapy. Overall I am happy with her care at this time.   -ED precautions given    The above findings, diagnostics, and treatment plan were discussed with Dr. Del Cid who is in agreement with the plan of care except as stated in additional documentation.       Che Butterfield PA-C          Future Appointments   Date Time Provider Department Center   8/17/2023  8:00 AM Ace Del Cid DO [facility]

## 2023-08-17 ENCOUNTER — HOSPITAL ENCOUNTER (OUTPATIENT)
Dept: RADIOLOGY | Facility: CLINIC | Age: 78
Discharge: HOME | End: 2023-08-17
Attending: ORTHOPAEDIC SURGERY
Payer: MEDICARE

## 2023-08-17 ENCOUNTER — OFFICE VISIT (OUTPATIENT)
Dept: ORTHOPEDICS | Facility: CLINIC | Age: 78
End: 2023-08-17
Payer: MEDICARE

## 2023-08-17 VITALS
HEART RATE: 79 BPM | WEIGHT: 130 LBS | TEMPERATURE: 97 F | SYSTOLIC BLOOD PRESSURE: 187 MMHG | DIASTOLIC BLOOD PRESSURE: 91 MMHG | BODY MASS INDEX: 20.89 KG/M2 | HEIGHT: 66 IN

## 2023-08-17 DIAGNOSIS — S72.8X2D OTHER CLOSED FRACTURE OF LEFT FEMUR WITH ROUTINE HEALING, UNSPECIFIED PORTION OF FEMUR, SUBSEQUENT ENCOUNTER: ICD-10-CM

## 2023-08-17 DIAGNOSIS — S72.8X2D OTHER CLOSED FRACTURE OF LEFT FEMUR WITH ROUTINE HEALING, UNSPECIFIED PORTION OF FEMUR, SUBSEQUENT ENCOUNTER: Primary | ICD-10-CM

## 2023-08-17 PROCEDURE — 99024 PR POST-OP FOLLOW-UP VISIT: ICD-10-PCS | Mod: POP,,, | Performed by: ORTHOPAEDIC SURGERY

## 2023-08-17 PROCEDURE — 73552 X-RAY EXAM OF FEMUR 2/>: CPT | Mod: LT,,, | Performed by: ORTHOPAEDIC SURGERY

## 2023-08-17 PROCEDURE — 73552 XR FEMUR 2 VIEW LEFT: ICD-10-PCS | Mod: LT,,, | Performed by: ORTHOPAEDIC SURGERY

## 2023-08-17 PROCEDURE — 99024 POSTOP FOLLOW-UP VISIT: CPT | Mod: POP,,, | Performed by: ORTHOPAEDIC SURGERY

## 2023-08-17 RX ORDER — LATANOPROST 50 UG/ML
SOLUTION/ DROPS OPHTHALMIC
COMMUNITY
Start: 2023-08-14 | End: 2024-03-14 | Stop reason: CLARIF

## 2023-08-17 NOTE — PROGRESS NOTES
Subjective:       Patient ID: Vickie Rahman is a 78 y.o. female.  Chief Complaint   Patient presents with    Left Femur - Follow-up     9.5 week f/u from imn left It femur fx. Denies pain or discomfort. States she completed home health physical therapy/         Follow-up        Patient presents for 10 week follow up IMN left IT femur fracture. She is doing very well overall, ambulating without difficulty with use of a Cane.  Patient is back to work as a seem stressed in [employer].  She is doing very well.  Some dull achy left hip pain but overall gets around efficiently.  No numbness or tingling.  ROS:  Constitutional: Denies fever chills  Eyes: No change in vision  ENT: No ringing or current infections  CV: No chest pain  Resp: No labored breathing  MSK: Pain evident at site of injury located in HPI,   Integ: No signs of abrasions or lacerations  Neuro: No numbness or tingling  Lymphatic: No swelling outside the area of injury     Current Outpatient Medications on File Prior to Visit   Medication Sig Dispense Refill    amitriptyline (ELAVIL) 25 MG tablet Take 1 tablet (25 mg total) by mouth every evening.      glimepiride (AMARYL) 2 MG tablet Take 2 mg by mouth 2 (two) times daily.      isosorbide mononitrate (IMDUR) 30 MG 24 hr tablet Take 1 tablet (30 mg total) by mouth once daily.      losartan-hydrochlorothiazide 100-12.5 mg (HYZAAR) 100-12.5 mg Tab Take 1 tablet by mouth.      metFORMIN (GLUCOPHAGE-XR) 500 MG ER 24hr tablet       metoprolol succinate (TOPROL-XL) 25 MG 24 hr tablet Take 1 tablet (25 mg total) by mouth once daily.      pioglitazone (ACTOS) 15 MG tablet       pravastatin (PRAVACHOL) 40 MG tablet Take 1 tablet (40 mg total) by mouth once daily.      rivaroxaban (XARELTO) 10 mg Tab Take 1 tablet (10 mg total) by mouth daily with dinner or evening meal. For dvt prevention post surgery      SYNTHROID 88 mcg tablet Take 88 mcg by mouth every morning.      latanoprost 0.005 % ophthalmic solution  "Place into both eyes.       No current facility-administered medications on file prior to visit.          Objective:      BP (!) 187/91   Pulse 79   Temp 97.4 °F (36.3 °C)   Ht 5' 6" (1.676 m)   Wt 59 kg (130 lb)   LMP  (LMP Unknown)   BMI 20.98 kg/m²   Physical Exam  General the patient is alert and oriented x3 no acute distress nontoxic-appearing appropriate affect.    Constitutional: Vital signs are examined and stable.  Resp: No signs of labored breathing    Musculoskeletal:     Left lower extremity:  Incisions mature compartments are soft and compressible; No pain with ROM at the hip, knee, or ankle; appropriate tenderness to palpation; dorsi/plantar flexes the foot; SILT distally; BCR distally; DP pulse 2+      Body mass index is 20.98 kg/m².  Ideal body weight: 59.3 kg (130 lb 11.7 oz)  Hemoglobin A1c   Date Value Ref Range Status   06/09/2023 6.5 (H) 4.0 - 6.0 % Final     Hgb   Date Value Ref Range Status   06/13/2023 9.7 (L) 11.8 - 16.0 g/dL Final   06/09/2023 9.9 (L) 11.8 - 16.0 g/dL Final     Hct   Date Value Ref Range Status   06/13/2023 28.1 (L) 36.0 - 48.0 % Final   06/09/2023 28.7 (L) 36.0 - 48.0 % Final     No results found for: "IRON"  No components found for: "FROLATE"  No results found for: "[id number]"  WBC   Date Value Ref Range Status   06/13/2023 7.54 4.00 - 11.50 x10(3)/mcL Final   06/09/2023 6.42 4.00 - 11.50 x10(3)/mcL Final       Radiology: 3 view x ray left femur: hardware intact without signs of loosening or failure.  Healing evident on the medial and lateral side of the fracture        Assessment:         1. Other closed fracture of left femur with routine healing, unspecified portion of femur, subsequent encounter  X-Ray Femur 2 View Left              Plan:         No follow-ups on file.    Vickie was seen today for follow-up.    Diagnoses and all orders for this visit:    Other closed fracture of left femur with routine healing, unspecified portion of femur, subsequent " encounter  -     X-Ray Femur 2 View Left; Future        Patient is doing very well getting around with a cane and back to work.  She is very active.  Politely declined physical therapy at this time.  She ambulates efficiently for her age and type of fracture.  We did discuss the risk of nonunion.  She is showing signs of union callus on the medial side.  If it goes on to nonunion she may need a diaphyseal fitting stem.  Follow-up 2 months for continued x-rays        Future Appointments   Date Time Provider Department Center   8/17/2023  8:00 AM Ace Del Cid DO [facility] [place]   10/18/2023  8:15 AM Ace Del Cid DO [facility] [place]

## 2023-10-18 ENCOUNTER — HOSPITAL ENCOUNTER (OUTPATIENT)
Dept: RADIOLOGY | Facility: CLINIC | Age: 78
Discharge: HOME | End: 2023-10-18
Attending: ORTHOPAEDIC SURGERY
Payer: MEDICARE

## 2023-10-18 ENCOUNTER — OFFICE VISIT (OUTPATIENT)
Dept: ORTHOPEDICS | Facility: CLINIC | Age: 78
End: 2023-10-18
Payer: MEDICARE

## 2023-10-18 VITALS
HEART RATE: 65 BPM | HEIGHT: 66 IN | DIASTOLIC BLOOD PRESSURE: 79 MMHG | BODY MASS INDEX: 20.57 KG/M2 | WEIGHT: 128 LBS | SYSTOLIC BLOOD PRESSURE: 163 MMHG

## 2023-10-18 DIAGNOSIS — S72.8X2D OTHER CLOSED FRACTURE OF LEFT FEMUR WITH ROUTINE HEALING, UNSPECIFIED PORTION OF FEMUR, SUBSEQUENT ENCOUNTER: ICD-10-CM

## 2023-10-18 DIAGNOSIS — S72.8X2D OTHER CLOSED FRACTURE OF LEFT FEMUR WITH ROUTINE HEALING, UNSPECIFIED PORTION OF FEMUR, SUBSEQUENT ENCOUNTER: Primary | ICD-10-CM

## 2023-10-18 PROCEDURE — 99213 PR OFFICE/OUTPT VISIT, EST, LEVL III, 20-29 MIN: ICD-10-PCS | Mod: ,,, | Performed by: ORTHOPAEDIC SURGERY

## 2023-10-18 PROCEDURE — 73552 XR FEMUR 2 VIEW LEFT: ICD-10-PCS | Mod: LT,,, | Performed by: ORTHOPAEDIC SURGERY

## 2023-10-18 PROCEDURE — 73552 X-RAY EXAM OF FEMUR 2/>: CPT | Mod: LT,,, | Performed by: ORTHOPAEDIC SURGERY

## 2023-10-18 PROCEDURE — 99213 OFFICE O/P EST LOW 20 MIN: CPT | Mod: ,,, | Performed by: ORTHOPAEDIC SURGERY

## 2023-10-18 NOTE — PROGRESS NOTES
Subjective:       Patient ID: Vickie Rahman is a 78 y.o. female.  Chief Complaint   Patient presents with    Follow-up     Follow up IMN left IT femur FX 6/5/23. No complaints of pain or swelling. Not taking any medicine. States that she is exercises at home.         Follow-up        Patient presents for 3mo follow up IMN left IT femur fracture. She is doing very well overall ambulating without assist at baseline appears to be well very happy with the care at this time no dull achy pain no numbness tingling  ROS:  Constitutional: Denies fever chills  Eyes: No change in vision  ENT: No ringing or current infections  CV: No chest pain  Resp: No labored breathing  MSK: Pain evident at site of injury located in HPI,   Integ: No signs of abrasions or lacerations  Neuro: No numbness or tingling  Lymphatic: No swelling outside the area of injury     Current Outpatient Medications on File Prior to Visit   Medication Sig Dispense Refill    amitriptyline (ELAVIL) 25 MG tablet Take 1 tablet (25 mg total) by mouth every evening.      glimepiride (AMARYL) 2 MG tablet Take 2 mg by mouth 2 (two) times daily.      isosorbide mononitrate (IMDUR) 30 MG 24 hr tablet Take 1 tablet (30 mg total) by mouth once daily.      latanoprost 0.005 % ophthalmic solution Place into both eyes.      losartan-hydrochlorothiazide 100-12.5 mg (HYZAAR) 100-12.5 mg Tab Take 1 tablet by mouth.      metFORMIN (GLUCOPHAGE-XR) 500 MG ER 24hr tablet       metoprolol succinate (TOPROL-XL) 25 MG 24 hr tablet Take 1 tablet (25 mg total) by mouth once daily.      pioglitazone (ACTOS) 15 MG tablet       pravastatin (PRAVACHOL) 40 MG tablet Take 1 tablet (40 mg total) by mouth once daily.      rivaroxaban (XARELTO) 10 mg Tab Take 1 tablet (10 mg total) by mouth daily with dinner or evening meal. For dvt prevention post surgery      SYNTHROID 88 mcg tablet Take 88 mcg by mouth every morning.       No current facility-administered medications on file prior to visit.  "         Objective:      BP (!) 163/79   Pulse 65   Ht 5' 6" (1.676 m)   Wt 58.1 kg (128 lb)   BMI 20.66 kg/m²   Physical Exam  General the patient is alert and oriented x3 no acute distress nontoxic-appearing appropriate affect.    Constitutional: Vital signs are examined and stable.  Resp: No signs of labored breathing    Musculoskeletal:     Left lower extremity:  Incisions mature compartments are soft and compressible; No pain with ROM at the hip, knee, or ankle; appropriate tenderness to palpation; dorsi/plantar flexes the foot; SILT distally; BCR distally; DP pulse 2+      Body mass index is 20.66 kg/m².  Ideal body weight: 59.3 kg (130 lb 11.7 oz)  Hemoglobin A1c   Date Value Ref Range Status   06/09/2023 6.5 (H) 4.0 - 6.0 % Final     Hgb   Date Value Ref Range Status   06/13/2023 9.7 (L) 11.8 - 16.0 g/dL Final   06/09/2023 9.9 (L) 11.8 - 16.0 g/dL Final     Hct   Date Value Ref Range Status   06/13/2023 28.1 (L) 36.0 - 48.0 % Final   06/09/2023 28.7 (L) 36.0 - 48.0 % Final     No results found for: "IRON"  No components found for: "FROLATE"  No results found for: "[id number]"  WBC   Date Value Ref Range Status   06/13/2023 7.54 4.00 - 11.50 x10(3)/mcL Final   06/09/2023 6.42 4.00 - 11.50 x10(3)/mcL Final       Radiology: 3 view x ray left femur: hardware intact without signs of loosening or failure.  Healed fracture        Assessment:         1. Other closed fracture of left femur with routine healing, unspecified portion of femur, subsequent encounter  X-Ray Femur 2 View Left              Plan:         No follow-ups on file.    Vickie was seen today for follow-up.    Diagnoses and all orders for this visit:    Other closed fracture of left femur with routine healing, unspecified portion of femur, subsequent encounter  -     X-Ray Femur 2 View Left; Future        Patient is doing very well.  She is very happy with the care at this time no pain on ambulation.  Follow up as needed.  All patient's questions " answered.    This note/OR report was created with the assistance of  voice recognition software or phone  dictation.  There may be transcription errors as a result of using this technology however minimal. Effort has been made to assure accuracy of transcription but any obvious errors or omissions should be clarified with the author of the document.       Ace Del Cid, DO  Orthopedic Trauma Surgery         No future appointments.

## 2023-12-30 ENCOUNTER — HOSPITAL ENCOUNTER (EMERGENCY)
Facility: HOSPITAL | Age: 78
Discharge: HOME | End: 2023-12-30
Attending: INTERNAL MEDICINE
Payer: MEDICARE

## 2023-12-30 VITALS
WEIGHT: 125 LBS | TEMPERATURE: 99 F | SYSTOLIC BLOOD PRESSURE: 124 MMHG | OXYGEN SATURATION: 96 % | HEIGHT: 65 IN | BODY MASS INDEX: 20.83 KG/M2 | DIASTOLIC BLOOD PRESSURE: 68 MMHG | RESPIRATION RATE: 18 BRPM | HEART RATE: 104 BPM

## 2023-12-30 DIAGNOSIS — R00.0 TACHYCARDIA: ICD-10-CM

## 2023-12-30 DIAGNOSIS — R05.9 COUGH: ICD-10-CM

## 2023-12-30 DIAGNOSIS — U07.1 COVID-19 VIRUS DETECTED: ICD-10-CM

## 2023-12-30 DIAGNOSIS — U07.1 COVID-19: ICD-10-CM

## 2023-12-30 LAB
ALBUMIN SERPL-MCNC: 3.8 G/DL (ref 3.4–4.8)
ALBUMIN/GLOB SERPL: 1.3 RATIO (ref 1.1–2)
ALP SERPL-CCNC: 70 UNIT/L (ref 40–150)
ALT SERPL-CCNC: 13 UNIT/L (ref 0–55)
AST SERPL-CCNC: 19 UNIT/L (ref 5–34)
BASOPHILS # BLD AUTO: 0.03 X10(3)/MCL
BASOPHILS NFR BLD AUTO: 0.3 %
BILIRUB SERPL-MCNC: 0.5 MG/DL
BUN SERPL-MCNC: 16 MG/DL (ref 9.8–20.1)
CALCIUM SERPL-MCNC: 9.2 MG/DL (ref 8.4–10.2)
CHLORIDE SERPL-SCNC: 103 MMOL/L (ref 98–107)
CO2 SERPL-SCNC: 20 MMOL/L (ref 23–31)
CREAT SERPL-MCNC: 0.94 MG/DL (ref 0.55–1.02)
D DIMER PPP IA.FEU-MCNC: 0.39 UG/ML FEU (ref 0–0.5)
EOSINOPHIL # BLD AUTO: 0.07 X10(3)/MCL (ref 0–0.9)
EOSINOPHIL NFR BLD AUTO: 0.8 %
ERYTHROCYTE [DISTWIDTH] IN BLOOD BY AUTOMATED COUNT: 12.8 % (ref 11.5–17)
GFR SERPLBLD CREATININE-BSD FMLA CKD-EPI: >60 MLS/MIN/1.73/M2
GLOBULIN SER-MCNC: 2.9 GM/DL (ref 2.4–3.5)
GLUCOSE SERPL-MCNC: 200 MG/DL (ref 82–115)
HCT VFR BLD AUTO: 38.7 % (ref 37–47)
HGB BLD-MCNC: 13.2 G/DL (ref 12–16)
IMM GRANULOCYTES # BLD AUTO: 0.05 X10(3)/MCL (ref 0–0.04)
IMM GRANULOCYTES NFR BLD AUTO: 0.5 %
INFLUENZA A (OHS): NEGATIVE
INFLUENZA B (OHS): NEGATIVE
LACTATE SERPL-SCNC: 1.3 MMOL/L (ref 0.5–2.2)
LIPASE SERPL-CCNC: 24 U/L
LYMPHOCYTES # BLD AUTO: 1.72 X10(3)/MCL (ref 0.6–4.6)
LYMPHOCYTES NFR BLD AUTO: 18.7 %
MAGNESIUM SERPL-MCNC: 1.5 MG/DL (ref 1.6–2.6)
MCH RBC QN AUTO: 30.1 PG (ref 27–31)
MCHC RBC AUTO-ENTMCNC: 34.1 G/DL (ref 33–36)
MCV RBC AUTO: 88.4 FL (ref 80–94)
MONOCYTES # BLD AUTO: 0.81 X10(3)/MCL (ref 0.1–1.3)
MONOCYTES NFR BLD AUTO: 8.8 %
NEUTROPHILS # BLD AUTO: 6.5 X10(3)/MCL (ref 2.1–9.2)
NEUTROPHILS NFR BLD AUTO: 70.9 %
PLATELET # BLD AUTO: 272 X10(3)/MCL (ref 130–400)
PMV BLD AUTO: 8.5 FL (ref 7.4–10.4)
POTASSIUM SERPL-SCNC: 3.8 MMOL/L (ref 3.5–5.1)
PROT SERPL-MCNC: 6.7 GM/DL (ref 5.8–7.6)
RBC # BLD AUTO: 4.38 X10(6)/MCL (ref 4.2–5.4)
SARS-COV-2 RDRP RESP QL NAA+PROBE: POSITIVE
SODIUM SERPL-SCNC: 135 MMOL/L (ref 136–145)
TROPONIN I SERPL-MCNC: 0.01 NG/ML (ref 0–0.04)
WBC # SPEC AUTO: 9.18 X10(3)/MCL (ref 4.5–11.5)

## 2023-12-30 PROCEDURE — 93005 ELECTROCARDIOGRAM TRACING: CPT

## 2023-12-30 PROCEDURE — 96366 THER/PROPH/DIAG IV INF ADDON: CPT

## 2023-12-30 PROCEDURE — 85025 COMPLETE CBC W/AUTO DIFF WBC: CPT | Performed by: NURSE PRACTITIONER

## 2023-12-30 PROCEDURE — 63600175 PHARM REV CODE 636 W HCPCS: Performed by: STUDENT IN AN ORGANIZED HEALTH CARE EDUCATION/TRAINING PROGRAM

## 2023-12-30 PROCEDURE — 96361 HYDRATE IV INFUSION ADD-ON: CPT

## 2023-12-30 PROCEDURE — 85379 FIBRIN DEGRADATION QUANT: CPT | Performed by: NURSE PRACTITIONER

## 2023-12-30 PROCEDURE — 87635 SARS-COV-2 COVID-19 AMP PRB: CPT | Performed by: NURSE PRACTITIONER

## 2023-12-30 PROCEDURE — 93005 ELECTROCARDIOGRAM TRACING: CPT | Performed by: INTERNAL MEDICINE

## 2023-12-30 PROCEDURE — 83735 ASSAY OF MAGNESIUM: CPT | Performed by: STUDENT IN AN ORGANIZED HEALTH CARE EDUCATION/TRAINING PROGRAM

## 2023-12-30 PROCEDURE — 25500020 PHARM REV CODE 255: Performed by: NURSE PRACTITIONER

## 2023-12-30 PROCEDURE — 80053 COMPREHEN METABOLIC PANEL: CPT | Performed by: NURSE PRACTITIONER

## 2023-12-30 PROCEDURE — 83605 ASSAY OF LACTIC ACID: CPT | Performed by: NURSE PRACTITIONER

## 2023-12-30 PROCEDURE — 25000003 PHARM REV CODE 250: Performed by: NURSE PRACTITIONER

## 2023-12-30 PROCEDURE — 84484 ASSAY OF TROPONIN QUANT: CPT | Performed by: NURSE PRACTITIONER

## 2023-12-30 PROCEDURE — 83690 ASSAY OF LIPASE: CPT | Performed by: NURSE PRACTITIONER

## 2023-12-30 PROCEDURE — 99285 EMERGENCY DEPT VISIT HI MDM: CPT | Mod: 25

## 2023-12-30 PROCEDURE — 87502 INFLUENZA DNA AMP PROBE: CPT | Performed by: NURSE PRACTITIONER

## 2023-12-30 PROCEDURE — 96365 THER/PROPH/DIAG IV INF INIT: CPT

## 2023-12-30 RX ORDER — ACETAMINOPHEN 325 MG/1
650 TABLET ORAL
Status: COMPLETED | OUTPATIENT
Start: 2023-12-30 | End: 2023-12-30

## 2023-12-30 RX ORDER — IBUPROFEN 600 MG/1
600 TABLET ORAL
Status: COMPLETED | OUTPATIENT
Start: 2023-12-30 | End: 2023-12-30

## 2023-12-30 RX ORDER — NIRMATRELVIR AND RITONAVIR 300-100 MG
KIT ORAL
Qty: 1 TABLET | Refills: 0 | Status: SHIPPED | OUTPATIENT
Start: 2023-12-30

## 2023-12-30 RX ORDER — MAGNESIUM SULFATE HEPTAHYDRATE 40 MG/ML
2 INJECTION, SOLUTION INTRAVENOUS
Status: COMPLETED | OUTPATIENT
Start: 2023-12-30 | End: 2023-12-30

## 2023-12-30 RX ADMIN — IOPAMIDOL 100 ML: 755 INJECTION, SOLUTION INTRAVENOUS at 06:12

## 2023-12-30 RX ADMIN — SODIUM CHLORIDE 1000 ML: 9 INJECTION, SOLUTION INTRAVENOUS at 05:12

## 2023-12-30 RX ADMIN — IBUPROFEN 600 MG: 600 TABLET, FILM COATED ORAL at 04:12

## 2023-12-30 RX ADMIN — ACETAMINOPHEN 650 MG: 325 TABLET, FILM COATED ORAL at 05:12

## 2023-12-30 RX ADMIN — MAGNESIUM SULFATE HEPTAHYDRATE 2 G: 40 INJECTION, SOLUTION INTRAVENOUS at 05:12

## 2023-12-30 NOTE — ED PROVIDER NOTES
Encounter Date: 12/30/2023       History     Chief Complaint   Patient presents with    Influenza     Flu like symptoms   started today         Patient is a 78-year-old female presents emerged department with nasal congestion body aches and fever that started yesterday afternoon.  She states it was mainly just nasal congestion runny nose yesterday and gradually she is felt worse denies mild cough the body aches and chills.  She denies any shortness of breath or chest pain.  She denies any nausea vomiting diarrhea.  She denies any other complaints or associated symptoms at this time.      Review of patient's allergies indicates:  No Known Allergies  Past Medical History:   Diagnosis Date    Coronary artery disease     Diabetes mellitus     Hypercholesteremia     Hypertension      Past Surgical History:   Procedure Laterality Date    BLADDER SURGERY      cardiac stents      EXCISIONAL HEMORRHOIDECTOMY      INTRAMEDULLARY RODDING OF FEMUR Left 6/5/2023    Procedure: INSERTION, INTRAMEDULLARY JAMAL, FEMUR;  Surgeon: Ace Del Cid DO;  Location: [facility];  Service: Orthopedics;  Laterality: Left;  supine hana table synthes c arm    SHOULDER SURGERY       Family History   Family history unknown: Yes     Social History     Tobacco Use    Smoking status: Never    Smokeless tobacco: Never   Substance Use Topics    Alcohol use: Not Currently    Drug use: Not Currently     Review of Systems   Constitutional:  Negative for activity change, appetite change and fever.   HENT:  Positive for congestion and rhinorrhea. Negative for dental problem, sinus pressure, sinus pain and sore throat.    Eyes:  Negative for discharge and itching.   Respiratory:  Positive for cough. Negative for apnea, chest tightness and shortness of breath.    Cardiovascular:  Negative for chest pain.   Gastrointestinal:  Negative for abdominal distention, abdominal pain and nausea.   Genitourinary:  Negative for dysuria, vaginal bleeding, vaginal discharge and  vaginal pain.   Musculoskeletal:  Positive for myalgias. Negative for back pain.   Skin:  Negative for rash.   Neurological:  Negative for dizziness, facial asymmetry and weakness.   Hematological:  Does not bruise/bleed easily.   Psychiatric/Behavioral:  Negative for agitation and behavioral problems.    All other systems reviewed and are negative.      Physical Exam     Initial Vitals [12/30/23 1549]   BP Pulse Resp Temp SpO2   (!) 191/97 (!) 126 18 100 °F (37.8 °C) 99 %      MAP       --         Physical Exam    Nursing note and vitals reviewed.  Constitutional: Vital signs are normal. She appears well-developed and well-nourished.  Non-toxic appearance. She does not have a sickly appearance.   HENT:   Head: Normocephalic and atraumatic.   Right Ear: External ear normal.   Left Ear: External ear normal.   Eyes: Conjunctivae, EOM and lids are normal. Pupils are equal, round, and reactive to light. Lids are everted and swept, no foreign bodies found.   Neck: Trachea normal and phonation normal. Neck supple. No thyroid mass and no thyromegaly present.   Normal range of motion.   Full passive range of motion without pain.     Cardiovascular:  Normal rate, regular rhythm, S1 normal, S2 normal, normal heart sounds, intact distal pulses and normal pulses.           Pulmonary/Chest: Breath sounds normal.   Abdominal: Abdomen is soft. There is no abdominal tenderness.   Musculoskeletal:      Cervical back: Full passive range of motion without pain, normal range of motion and neck supple.     Lymphadenopathy:     She has no cervical adenopathy.   Neurological: She is alert and oriented to person, place, and time. She has normal strength.   Skin: Skin is warm, dry and intact. Capillary refill takes less than 2 seconds.   Psychiatric: She has a normal mood and affect. Her speech is normal and behavior is normal. Judgment normal. Cognition and memory are normal.         ED Course   Procedures  Labs Reviewed   SARS-COV-2 RNA  AMPLIFICATION, QUAL - Abnormal; Notable for the following components:       Result Value    SARS COV-2 MOLECULAR Positive (*)     All other components within normal limits    Narrative:     The IDNOW COVID-19 assay is a rapid molecular in vitro diagnostic test utilizing an isothermal nucleic acid amplification technology intended for the qualitative detection of nucleic acid from the SARS-CoV-2 viral RNA in direct nasal, nasopharyngeal or throat swabs from individuals who are suspected of COVID-19 by their healthcare provider.   COMPREHENSIVE METABOLIC PANEL - Abnormal; Notable for the following components:    Sodium Level 135 (*)     Carbon Dioxide 20 (*)     Glucose Level 200 (*)     All other components within normal limits   CBC WITH DIFFERENTIAL - Abnormal; Notable for the following components:    IG# 0.05 (*)     All other components within normal limits   MAGNESIUM - Abnormal; Notable for the following components:    Magnesium Level 1.50 (*)     All other components within normal limits   RAPID INFLUENZA A/B - Normal   D DIMER, QUANTITATIVE - Normal   TROPONIN I - Normal   LACTIC ACID, PLASMA - Normal   LIPASE - Normal   CBC W/ AUTO DIFFERENTIAL    Narrative:     The following orders were created for panel order CBC auto differential.  Procedure                               Abnormality         Status                     ---------                               -----------         ------                     CBC with Differential[[id number]]       Abnormal            Final result                 Please view results for these tests on the individual orders.     EKG Readings: (Independently Interpreted)   EKG done at 5:18 p.m. reads wide QRS tachycardia with right bundle branch block.  Abnormal ECG.  Ventral rate of 127 beats per minute.  Seen interpreted by Dr. Juan Jose Vanegas.        Imaging Results              CT Chest With Contrast (Preliminary result)  Result time 12/30/23 19:18:41      Preliminary result  by Kurtis Burk MD (12/30/23 19:18:41)                   Narrative:    START OF REPORT:  Technique: CT Scan of the chest was performed with intravenous contrast with direct axial images as well as sagittal and coronal reconstruction images.    Dosage Information: Automated Exposure Control was utilized 99.78 mGy.cm.    Comparison: None.    Clinical History: Sob.    Findings:  Soft Tissues: Unremarkable.  Axilla: A few prominent lymph nodes are seen in the left and right axilla.  Neck: There are a few calcified nodules in the right thyroid lobe.  Mediastinum: The mediastinal structures are within normal limits.  Heart: Mild cardiomegaly is seen. Coronary artery calcification is seen.  Aorta: Aortic calcification is seen in the thoracic aorta.  Pulmonary Arteries: Unremarkable.  Lungs: There is moderate non specific dependent change at the lung bases. Otherwise clear lungs with no focal infiltrate or consolidation.  Pleura: No effusions or pneumothorax are identified.  Bony Structures:  Spine: Spondylotic changes are seen in the thoracic spine.  Abdomen: Fatty infiltration of the liver is present. There is moderate pericholecystic fluid with the visualized gallbladder nondistended and without definitive gallstones. This may reflect hepatic dysfunction or infection.      Impression:  1. There is moderate pericholecystic fluid with the visualized gallbladder nondistended and without definitive gallstones. This may reflect hepatic dysfunction or infection. Correlate with clinical and laboratory findings as regards additional evaluation and follow-up.  2. No acute intrathoracic process identified. Details and other findings as discussed above.                                         X-Ray Chest 1 View (In process)                      Medications   ibuprofen tablet 600 mg (600 mg Oral Given 12/30/23 1614)   sodium chloride 0.9% bolus 1,000 mL 1,000 mL (0 mLs Intravenous Stopped 12/30/23 1820)   magnesium sulfate 2g in  water 50mL IVPB (premix) (0 g Intravenous Stopped 12/30/23 1944)   acetaminophen tablet 650 mg (650 mg Oral Given 12/30/23 1748)   iopamidoL (ISOVUE-370) injection 100 mL (100 mLs Intravenous Given 12/30/23 1848)     Medical Decision Making  Patient is a 78-year-old female presents emerged department with nasal congestion body aches and fever that started yesterday afternoon.  She states it was mainly just nasal congestion runny nose yesterday and gradually she is felt worse denies mild cough the body aches and chills.  She denies any shortness of breath or chest pain.  She denies any nausea vomiting diarrhea.  She denies any other complaints or associated symptoms at this time.        Problems Addressed:  COVID-19: acute illness or injury     Details: Patient had full workup here with CT of chest labs and swabs in his positive for COVID.  Paxlovid sent to patient's pharmacy for diagnosis of COVID.  Strict ED return precautions discussed for any change or worsening symptoms.  Instructed patient to rest and hydrate next 12:48 p.m..  Tachycardia: acute illness or injury     Details: Tachycardia was improved with fever control and fluids.  Sent home with instructions to continue hydrate and to return emerged department should her condition change or worsen.    Amount and/or Complexity of Data Reviewed  Labs: ordered. Decision-making details documented in ED Course.  Radiology: ordered. Decision-making details documented in ED Course.    Risk  OTC drugs.  Prescription drug management.               ED Course as of 12/30/23 2018   Sat Dec 30, 2023   1824 Patient is very pleasant elderly female, speaking in full sentences and appears to be in no distress. CXR without significant infiltrates, SaO2 of 98% on RA. HR remains elevated in 120s to 130s in setting of fever. Will obtain CT lungs with contrast prior to final dispo. Possible admit vs home with trial paxlovid and close monitoring  [MP]   1840 Lactate, Hair: 1.3 [MP]    1908 Pulse: 108 [MP]   1908 Temp: 99.1 °F (37.3 °C) [MP]   1908 Prelim CT looks mild. HR improving with reduction in fever [MP]   1934 Soft abdomen, no tenderness, reports good appetite [MP]   1942 Dr. Skinner has been extensively involved in this patient's care and did have face-to-face time with this patient.  Me and the patient did have a additional discussion about results of her CT in the findings of some fluid around her gallbladder.  She has not having pain now in her abdomen she has not been having any nausea vomiting or changes in her appetite.  Will give her on-call surgeon for follow-up outpatient leave.  Will review medical records for medication list to put her on a dose of Paxlovid to go home with.  Strict ED return precautions discussed for any changing worsening symptoms with patient and family both verbalized understanding of. [SL]   1948 Risks benefits of Paxlovid discussed with patient as well as contraindication in medication.  Patient still elected to continue with this prescription. [SL]      ED Course User Index  [MP] Derrell Holloway, DO  [SL] Kalia Talbot FNP                           Clinical Impression:  Final diagnoses:  [U07.1] COVID-19  [R05.9] Cough  [R00.0] Tachycardia          ED Disposition Condition    Discharge Stable          ED Prescriptions       Medication Sig Dispense Start Date End Date Auth. Provider    nirmatrelvir-ritonavir (PAXLOVID) 300 mg (150 mg x 2)-100 mg copackaged tablets (EUA) Take 3 tablets by mouth 2 (two) times daily. Each dose contains 2 nirmatrelvir (pink tablets) and 1 ritonavir (white tablet). Take all 3 tablets together 1 tablet 12/30/2023 -- Kalia Talbot FNP          Follow-up Information       Follow up With Specialties Details Why Contact Info    Mau Dominique III, MD Family Medicine Schedule an appointment as soon as possible for a visit in 3 days For ER Follow Up. [address] [id number]  [phone]                Kalia Talbot, ZANDRA  12/30/23 1953       Kalia Talbot, ZANDRA  12/30/23 2018

## 2024-01-10 ENCOUNTER — HOSPITAL ENCOUNTER (OUTPATIENT)
Dept: RADIOLOGY | Facility: HOSPITAL | Age: 79
Discharge: HOME | End: 2024-01-10
Attending: FAMILY MEDICINE
Payer: MEDICARE

## 2024-01-10 DIAGNOSIS — R10.9 STOMACH ACHE: ICD-10-CM

## 2024-01-10 PROCEDURE — 76705 ECHO EXAM OF ABDOMEN: CPT | Mod: TC

## 2024-03-14 ENCOUNTER — HOSPITAL ENCOUNTER (INPATIENT)
Facility: HOSPITAL | Age: 79
LOS: 4 days | Discharge: HOME HEALTH | DRG: 536 | End: 2024-03-19
Attending: FAMILY MEDICINE | Admitting: FAMILY MEDICINE
Payer: MEDICARE

## 2024-03-14 DIAGNOSIS — S32.82XA MULTIPLE CLOSED FRACTURES OF PELVIS WITHOUT DISRUPTION OF PELVIC RING, INITIAL ENCOUNTER: Primary | ICD-10-CM

## 2024-03-14 LAB
ALBUMIN SERPL-MCNC: 4.3 G/DL (ref 3.4–5)
ALBUMIN/GLOB SERPL: 1.8 RATIO
ALP SERPL-CCNC: 60 UNIT/L (ref 50–144)
ALT SERPL-CCNC: 17 UNIT/L (ref 1–45)
ANION GAP SERPL CALC-SCNC: 5 MEQ/L (ref 2–13)
APPEARANCE UR: CLEAR
AST SERPL-CCNC: 24 UNIT/L (ref 14–36)
BASOPHILS # BLD AUTO: 0.04 X10(3)/MCL (ref 0.01–0.08)
BASOPHILS NFR BLD AUTO: 0.4 % (ref 0.1–1.2)
BILIRUB SERPL-MCNC: 0.5 MG/DL (ref 0–1)
BILIRUB UR QL STRIP.AUTO: NEGATIVE
BUN SERPL-MCNC: 16 MG/DL (ref 7–20)
CALCIUM SERPL-MCNC: 9.1 MG/DL (ref 8.4–10.2)
CHLORIDE SERPL-SCNC: 105 MMOL/L (ref 98–110)
CO2 SERPL-SCNC: 28 MMOL/L (ref 21–32)
COLOR UR AUTO: YELLOW
CREAT SERPL-MCNC: 0.89 MG/DL (ref 0.66–1.25)
CREAT/UREA NIT SERPL: 18 (ref 12–20)
EOSINOPHIL # BLD AUTO: 0.16 X10(3)/MCL (ref 0.04–0.36)
EOSINOPHIL NFR BLD AUTO: 1.6 % (ref 0.7–7)
ERYTHROCYTE [DISTWIDTH] IN BLOOD BY AUTOMATED COUNT: 13.2 % (ref 11–14.5)
GFR SERPLBLD CREATININE-BSD FMLA CKD-EPI: 66 MLS/MIN/1.73/M2
GLOBULIN SER-MCNC: 2.4 GM/DL (ref 2–3.9)
GLUCOSE SERPL-MCNC: 102 MG/DL (ref 70–115)
GLUCOSE UR QL STRIP.AUTO: NEGATIVE
HCT VFR BLD AUTO: 37.2 % (ref 36–48)
HGB BLD-MCNC: 13 G/DL (ref 11.8–16)
IMM GRANULOCYTES # BLD AUTO: 0.12 X10(3)/MCL (ref 0–0.03)
IMM GRANULOCYTES NFR BLD AUTO: 1.2 % (ref 0–0.5)
KETONES UR QL STRIP.AUTO: NEGATIVE
LEUKOCYTE ESTERASE UR QL STRIP.AUTO: NEGATIVE
LYMPHOCYTES # BLD AUTO: 2.21 X10(3)/MCL (ref 1.16–3.74)
LYMPHOCYTES NFR BLD AUTO: 21.4 % (ref 20–55)
MCH RBC QN AUTO: 31.2 PG (ref 27–34)
MCHC RBC AUTO-ENTMCNC: 34.9 G/DL (ref 31–37)
MCV RBC AUTO: 89.2 FL (ref 79–99)
MONOCYTES # BLD AUTO: 0.69 X10(3)/MCL (ref 0.24–0.36)
MONOCYTES NFR BLD AUTO: 6.7 % (ref 4.7–12.5)
NEUTROPHILS # BLD AUTO: 7.1 X10(3)/MCL (ref 1.56–6.13)
NEUTROPHILS NFR BLD AUTO: 68.7 % (ref 37–73)
NITRITE UR QL STRIP.AUTO: NEGATIVE
NRBC BLD AUTO-RTO: 0 %
PH UR STRIP.AUTO: 6.5 [PH]
PLATELET # BLD AUTO: 284 X10(3)/MCL (ref 140–371)
PMV BLD AUTO: 8.4 FL (ref 9.4–12.4)
POCT GLUCOSE: 174 MG/DL (ref 70–110)
POCT GLUCOSE: 202 MG/DL (ref 70–110)
POTASSIUM SERPL-SCNC: 3.4 MMOL/L (ref 3.5–5.1)
PROT SERPL-MCNC: 6.7 GM/DL (ref 6.3–8.2)
PROT UR QL STRIP.AUTO: NEGATIVE
RBC # BLD AUTO: 4.17 X10(6)/MCL (ref 4–5.1)
RBC UR QL AUTO: NEGATIVE
SODIUM SERPL-SCNC: 138 MMOL/L (ref 135–145)
SP GR UR STRIP.AUTO: <=1.005 (ref 1–1.03)
UROBILINOGEN UR STRIP-ACNC: 0.2
WBC # SPEC AUTO: 10.32 X10(3)/MCL (ref 4–11.5)

## 2024-03-14 PROCEDURE — 81003 URINALYSIS AUTO W/O SCOPE: CPT | Performed by: FAMILY MEDICINE

## 2024-03-14 PROCEDURE — 80053 COMPREHEN METABOLIC PANEL: CPT | Performed by: FAMILY MEDICINE

## 2024-03-14 PROCEDURE — 63600175 PHARM REV CODE 636 W HCPCS: Performed by: INTERNAL MEDICINE

## 2024-03-14 PROCEDURE — 63600175 PHARM REV CODE 636 W HCPCS: Performed by: FAMILY MEDICINE

## 2024-03-14 PROCEDURE — 96374 THER/PROPH/DIAG INJ IV PUSH: CPT

## 2024-03-14 PROCEDURE — 94761 N-INVAS EAR/PLS OXIMETRY MLT: CPT

## 2024-03-14 PROCEDURE — G0378 HOSPITAL OBSERVATION PER HR: HCPCS

## 2024-03-14 PROCEDURE — 51702 INSERT TEMP BLADDER CATH: CPT

## 2024-03-14 PROCEDURE — 25000003 PHARM REV CODE 250: Performed by: FAMILY MEDICINE

## 2024-03-14 PROCEDURE — 85025 COMPLETE CBC W/AUTO DIFF WBC: CPT | Performed by: FAMILY MEDICINE

## 2024-03-14 PROCEDURE — 96372 THER/PROPH/DIAG INJ SC/IM: CPT | Performed by: INTERNAL MEDICINE

## 2024-03-14 PROCEDURE — 99285 EMERGENCY DEPT VISIT HI MDM: CPT | Mod: 25

## 2024-03-14 RX ORDER — ONDANSETRON HYDROCHLORIDE 2 MG/ML
4 INJECTION, SOLUTION INTRAVENOUS EVERY 6 HOURS PRN
Status: DISCONTINUED | OUTPATIENT
Start: 2024-03-14 | End: 2024-03-19 | Stop reason: HOSPADM

## 2024-03-14 RX ORDER — ASPIRIN 81 MG/1
81 TABLET ORAL DAILY
Status: DISCONTINUED | OUTPATIENT
Start: 2024-03-14 | End: 2024-03-14

## 2024-03-14 RX ORDER — HYDRALAZINE HYDROCHLORIDE 20 MG/ML
10 INJECTION INTRAMUSCULAR; INTRAVENOUS
Status: COMPLETED | OUTPATIENT
Start: 2024-03-14 | End: 2024-03-14

## 2024-03-14 RX ORDER — SODIUM CHLORIDE 0.9 % (FLUSH) 0.9 %
10 SYRINGE (ML) INJECTION EVERY 8 HOURS PRN
Status: DISCONTINUED | OUTPATIENT
Start: 2024-03-14 | End: 2024-03-19 | Stop reason: HOSPADM

## 2024-03-14 RX ORDER — METOPROLOL SUCCINATE 25 MG/1
25 TABLET, EXTENDED RELEASE ORAL DAILY
Status: DISCONTINUED | OUTPATIENT
Start: 2024-03-14 | End: 2024-03-14

## 2024-03-14 RX ORDER — ASPIRIN 81 MG/1
81 TABLET ORAL NIGHTLY
Status: DISCONTINUED | OUTPATIENT
Start: 2024-03-15 | End: 2024-03-19 | Stop reason: HOSPADM

## 2024-03-14 RX ORDER — IBUPROFEN 200 MG
24 TABLET ORAL
Status: DISCONTINUED | OUTPATIENT
Start: 2024-03-14 | End: 2024-03-19 | Stop reason: HOSPADM

## 2024-03-14 RX ORDER — HYDROCODONE BITARTRATE AND ACETAMINOPHEN 5; 325 MG/1; MG/1
1 TABLET ORAL EVERY 6 HOURS PRN
Status: DISCONTINUED | OUTPATIENT
Start: 2024-03-14 | End: 2024-03-19 | Stop reason: HOSPADM

## 2024-03-14 RX ORDER — AMITRIPTYLINE HYDROCHLORIDE 25 MG/1
25 TABLET, FILM COATED ORAL NIGHTLY
Status: DISCONTINUED | OUTPATIENT
Start: 2024-03-14 | End: 2024-03-19 | Stop reason: HOSPADM

## 2024-03-14 RX ORDER — LOSARTAN POTASSIUM 50 MG/1
100 TABLET ORAL DAILY
Status: DISCONTINUED | OUTPATIENT
Start: 2024-03-14 | End: 2024-03-19 | Stop reason: HOSPADM

## 2024-03-14 RX ORDER — HYDROCHLOROTHIAZIDE 12.5 MG/1
12.5 TABLET ORAL DAILY
Status: DISCONTINUED | OUTPATIENT
Start: 2024-03-14 | End: 2024-03-19 | Stop reason: HOSPADM

## 2024-03-14 RX ORDER — ISOSORBIDE MONONITRATE 30 MG/1
30 TABLET, EXTENDED RELEASE ORAL DAILY
Status: DISCONTINUED | OUTPATIENT
Start: 2024-03-14 | End: 2024-03-19 | Stop reason: HOSPADM

## 2024-03-14 RX ORDER — METOPROLOL SUCCINATE 25 MG/1
25 TABLET, EXTENDED RELEASE ORAL NIGHTLY
Status: DISCONTINUED | OUTPATIENT
Start: 2024-03-15 | End: 2024-03-19 | Stop reason: HOSPADM

## 2024-03-14 RX ORDER — GLUCAGON 1 MG
1 KIT INJECTION
Status: DISCONTINUED | OUTPATIENT
Start: 2024-03-14 | End: 2024-03-19 | Stop reason: HOSPADM

## 2024-03-14 RX ORDER — ACETAMINOPHEN 325 MG/1
650 TABLET ORAL EVERY 8 HOURS PRN
Status: DISCONTINUED | OUTPATIENT
Start: 2024-03-14 | End: 2024-03-19 | Stop reason: HOSPADM

## 2024-03-14 RX ORDER — HYDRALAZINE HYDROCHLORIDE 20 MG/ML
10 INJECTION INTRAMUSCULAR; INTRAVENOUS EVERY 6 HOURS PRN
Status: DISCONTINUED | OUTPATIENT
Start: 2024-03-14 | End: 2024-03-19 | Stop reason: HOSPADM

## 2024-03-14 RX ORDER — LEVOTHYROXINE SODIUM 88 UG/1
88 TABLET ORAL EVERY MORNING
Status: DISCONTINUED | OUTPATIENT
Start: 2024-03-15 | End: 2024-03-19 | Stop reason: HOSPADM

## 2024-03-14 RX ORDER — IBUPROFEN 200 MG
16 TABLET ORAL
Status: DISCONTINUED | OUTPATIENT
Start: 2024-03-14 | End: 2024-03-19 | Stop reason: HOSPADM

## 2024-03-14 RX ORDER — ASPIRIN 81 MG/1
81 TABLET ORAL NIGHTLY
COMMUNITY

## 2024-03-14 RX ORDER — INSULIN ASPART 100 [IU]/ML
0-5 INJECTION, SOLUTION INTRAVENOUS; SUBCUTANEOUS
Status: DISCONTINUED | OUTPATIENT
Start: 2024-03-14 | End: 2024-03-19 | Stop reason: HOSPADM

## 2024-03-14 RX ORDER — PRAVASTATIN SODIUM 20 MG/1
40 TABLET ORAL NIGHTLY
Status: DISCONTINUED | OUTPATIENT
Start: 2024-03-15 | End: 2024-03-19 | Stop reason: HOSPADM

## 2024-03-14 RX ORDER — PRAVASTATIN SODIUM 20 MG/1
40 TABLET ORAL DAILY
Status: DISCONTINUED | OUTPATIENT
Start: 2024-03-14 | End: 2024-03-14

## 2024-03-14 RX ADMIN — AMITRIPTYLINE HYDROCHLORIDE 25 MG: 25 TABLET, FILM COATED ORAL at 08:03

## 2024-03-14 RX ADMIN — HYDROCODONE BITARTRATE AND ACETAMINOPHEN 1 TABLET: 5; 325 TABLET ORAL at 10:03

## 2024-03-14 RX ADMIN — INSULIN ASPART 1 UNITS: 100 INJECTION, SOLUTION INTRAVENOUS; SUBCUTANEOUS at 09:03

## 2024-03-14 RX ADMIN — HYDRALAZINE HYDROCHLORIDE 10 MG: 20 INJECTION INTRAMUSCULAR; INTRAVENOUS at 12:03

## 2024-03-14 RX ADMIN — HYDROCODONE BITARTRATE AND ACETAMINOPHEN 1 TABLET: 5; 325 TABLET ORAL at 03:03

## 2024-03-14 NOTE — SUBJECTIVE & OBJECTIVE
Past Medical History:   Diagnosis Date    Coronary artery disease     Diabetes mellitus     Hypercholesteremia     Hypertension        Past Surgical History:   Procedure Laterality Date    BLADDER SURGERY      cardiac stents      EXCISIONAL HEMORRHOIDECTOMY      INTRAMEDULLARY RODDING OF FEMUR Left 6/5/2023    Procedure: INSERTION, INTRAMEDULLARY JAMAL, FEMUR;  Surgeon: Ace Del Cid DO;  Location: [facility];  Service: Orthopedics;  Laterality: Left;  supine hana table synthes c arm    SHOULDER SURGERY         Review of patient's allergies indicates:  No Known Allergies    No current facility-administered medications on file prior to encounter.     Current Outpatient Medications on File Prior to Encounter   Medication Sig    amitriptyline (ELAVIL) 25 MG tablet Take 1 tablet (25 mg total) by mouth every evening.    aspirin (ECOTRIN) 81 MG EC tablet Take 81 mg by mouth once daily.    glimepiride (AMARYL) 2 MG tablet Take 2 mg by mouth 2 (two) times daily.    isosorbide mononitrate (IMDUR) 30 MG 24 hr tablet Take 1 tablet (30 mg total) by mouth once daily.    losartan-hydrochlorothiazide 100-12.5 mg (HYZAAR) 100-12.5 mg Tab Take 1 tablet by mouth.    metFORMIN (GLUCOPHAGE-XR) 500 MG ER 24hr tablet     metoprolol succinate (TOPROL-XL) 25 MG 24 hr tablet Take 1 tablet (25 mg total) by mouth once daily. (Patient taking differently: Take 50 mg by mouth once daily.)    pioglitazone (ACTOS) 15 MG tablet     pravastatin (PRAVACHOL) 40 MG tablet Take 1 tablet (40 mg total) by mouth once daily.    SYNTHROID 88 mcg tablet Take 88 mcg by mouth every morning.    [DISCONTINUED] latanoprost 0.005 % ophthalmic solution Place into both eyes.    [DISCONTINUED] nirmatrelvir-ritonavir (PAXLOVID) 300 mg (150 mg x 2)-100 mg copackaged tablets (EUA) Take 3 tablets by mouth 2 (two) times daily. Each dose contains 2 nirmatrelvir (pink tablets) and 1 ritonavir (white tablet). Take all 3 tablets together    [DISCONTINUED] rivaroxaban (XARELTO) 10  mg Tab Take 1 tablet (10 mg total) by mouth daily with dinner or evening meal. For dvt prevention post surgery     Family History    Family history is unknown by patient.       Tobacco Use    Smoking status: Never    Smokeless tobacco: Never   Substance and Sexual Activity    Alcohol use: Not Currently    Drug use: Not Currently    Sexual activity: Not on file     Review of Systems   Constitutional:  Negative for activity change, appetite change and fever.   Respiratory:  Negative for chest tightness, shortness of breath and wheezing.    Cardiovascular:  Negative for chest pain.   Gastrointestinal:  Negative for abdominal pain, constipation, diarrhea, nausea and vomiting.   Genitourinary:  Negative for dysuria.   Musculoskeletal:  Positive for arthralgias, gait problem, joint swelling and myalgias.   Skin:  Negative for rash and wound.   Neurological:  Positive for weakness. Negative for tremors and headaches.     Objective:     Vital Signs (Most Recent):  Temp: 97.8 °F (36.6 °C) (03/14/24 1420)  Pulse: 103 (03/14/24 1420)  Resp: 16 (03/14/24 1519)  BP: (!) 166/92 (03/14/24 1420)  SpO2: 96 % (03/14/24 1420) Vital Signs (24h Range):  Temp:  [97.8 °F (36.6 °C)-98.2 °F (36.8 °C)] 97.8 °F (36.6 °C)  Pulse:  [] 103  Resp:  [16-20] 16  SpO2:  [96 %-99 %] 96 %  BP: (156-213)/() 166/92     Weight: 55.5 kg (122 lb 6.4 oz)  Body mass index is 20.37 kg/m².     Physical Exam  Constitutional:       General: She is not in acute distress.     Appearance: Normal appearance. She is normal weight. She is not ill-appearing.   HENT:      Head: Normocephalic and atraumatic.   Eyes:      General: No scleral icterus.     Extraocular Movements: Extraocular movements intact.   Cardiovascular:      Rate and Rhythm: Normal rate and regular rhythm.      Pulses: Normal pulses.      Heart sounds: Normal heart sounds.   Pulmonary:      Effort: Pulmonary effort is normal.      Breath sounds: Normal breath sounds.   Abdominal:       General: Abdomen is flat. Bowel sounds are normal.      Palpations: Abdomen is soft.   Musculoskeletal:         General: Tenderness present.      Right lower leg: No edema.      Left lower leg: No edema.      Comments: Left hip bruising and painful to touch hip and toward medially of pubic area   Skin:     General: Skin is warm and dry.      Capillary Refill: Capillary refill takes less than 2 seconds.      Findings: No erythema, lesion or rash.   Neurological:      General: No focal deficit present.      Mental Status: She is alert and oriented to person, place, and time.   Psychiatric:         Mood and Affect: Mood normal.         Behavior: Behavior normal.         Thought Content: Thought content normal.                Significant Labs: All pertinent labs within the past 24 hours have been reviewed.  CBC:   Recent Labs   Lab 03/14/24  1219   WBC 10.32   HGB 13.0   HCT 37.2        CMP:   Recent Labs   Lab 03/14/24  1219      K 3.4*   CO2 28   BUN 16.0   CREATININE 0.89   CALCIUM 9.1   ALBUMIN 4.3   BILITOT 0.5   ALKPHOS 60   AST 24   ALT 17       Significant Imaging: I have reviewed all pertinent imaging results/findings within the past 24 hours.

## 2024-03-14 NOTE — HPI
80 yo F with h/o prior left hip fx June 2023, fell after getting tangled up with her large standard poodle pet and tripped landing on her left side and having severe acute onset pain in left hip.  She was seen in ER and found to have 2 pubic rami fractures.  Hip is intact.  She is c/o pain.  PMHX HTN and DM.

## 2024-03-14 NOTE — Clinical Note
Diagnosis: Multiple closed fractures of pelvis without disruption of pelvic ring, initial encounter [[id number]]   Future Attending Provider: PREM LATHAM [[id number]]

## 2024-03-14 NOTE — ED PROVIDER NOTES
Encounter Date: 3/14/2024       History     Chief Complaint   Patient presents with    Fall     PRESENTS TO ED PER POV WITH C/O FALL W/O HEAD INJURY 30M PTA. PATIENT STATES WAS TRIPPED BY DOGS AND LANDED ON LT. SIDE. C/O LT HIP PAIN     Patient presents to the emergency room after falling at home.  She was tripped by her 2 dogs and fell onto her left hip.  There was no head injury involved.  She does have a previous left hip replacement, and she can not bear weight on her left leg.  Head but did strike her left shoulder.  This is not bothering her.    The history is provided by the patient and a relative.     Review of patient's allergies indicates:  No Known Allergies  Past Medical History:   Diagnosis Date    Coronary artery disease     Diabetes mellitus     Hypercholesteremia     Hypertension      Past Surgical History:   Procedure Laterality Date    BLADDER SURGERY      cardiac stents      EXCISIONAL HEMORRHOIDECTOMY      INTRAMEDULLARY RODDING OF FEMUR Left 6/5/2023    Procedure: INSERTION, INTRAMEDULLARY JAMAL, FEMUR;  Surgeon: Ace Del Cid DO;  Location: [facility];  Service: Orthopedics;  Laterality: Left;  supine hana table synthes c arm    SHOULDER SURGERY       Family History   Family history unknown: Yes     Social History     Tobacco Use    Smoking status: Never    Smokeless tobacco: Never   Substance Use Topics    Alcohol use: Not Currently    Drug use: Not Currently     Review of Systems   Constitutional:  Negative for fever.   HENT:  Negative for sore throat.    Respiratory:  Negative for shortness of breath.    Cardiovascular:  Negative for chest pain.   Gastrointestinal:  Negative for nausea.   Genitourinary:  Negative for dysuria.   Musculoskeletal:  Negative for back pain and neck pain.   Skin:  Negative for rash.   Neurological:  Negative for weakness.   Hematological:  Does not bruise/bleed easily.   All other systems reviewed and are negative.      Physical Exam     Initial Vitals [03/14/24 1043]    BP Pulse Resp Temp SpO2   (!) 213/106 91 18 98.1 °F (36.7 °C) 98 %      MAP       --         Physical Exam    Nursing note and vitals reviewed.  Constitutional: She appears well-developed and well-nourished.   HENT:   Head: Normocephalic and atraumatic.   Eyes: EOM are normal. Pupils are equal, round, and reactive to light.   Neck: Neck supple.   Normal range of motion.  Cardiovascular:  Normal rate, regular rhythm and normal heart sounds.           Pulmonary/Chest: Breath sounds normal.   Abdominal: Abdomen is soft. Bowel sounds are normal. There is no abdominal tenderness.   Musculoskeletal:         General: No edema. Normal range of motion.      Cervical back: Normal range of motion and neck supple.      Comments: Posterior aspect of left shoulder with small abrasion.  Areas not tender with full range of motion of her left arm.    Left leg is moderately tender to the lateral aspect of the hip.  Left knee and left ankle are stable and nontender.  She has distal capillary refill less than 3 seconds sensation is fully intact.     Neurological: She is alert and oriented to person, place, and time.   Skin: Skin is warm and dry. Capillary refill takes less than 2 seconds.   Psychiatric: She has a normal mood and affect.         ED Course   Procedures  Labs Reviewed   CBC W/ AUTO DIFFERENTIAL    Narrative:     The following orders were created for panel order CBC Auto Differential.  Procedure                               Abnormality         Status                     ---------                               -----------         ------                     CBC with Differential[[id number]]                                                        Please view results for these tests on the individual orders.   COMPREHENSIVE METABOLIC PANEL   URINALYSIS   CBC WITH DIFFERENTIAL          Imaging Results              X-Ray Hip 2 or 3 views Left (with Pelvis when performed) (Final result)  Result time 03/14/24 11:38:59      Final  result by Filiberto Valenzuela III, MD (03/14/24 11:38:59)                   Impression:      1. Chronic changes are present as described above.  See above comments.  2. Acute, nondisplaced fractures are noted involving the left superior and inferior pubic ramus as described above.      Electronically signed by: Filiberto Valenzuela  Date:    03/14/2024  Time:    11:38               Narrative:    EXAMINATION:  STUDY: XR HIP WITH PELVIS WHEN PERFORMED, 2 OR 3 VIEWS LEFT    CLINICAL HISTORY AND TECHNIQUE:  Trauma    COMPARISON:  06/05/2023    FINDINGS:  There is moderate demineralization of the skeletal structures with moderate degenerative changes noted involving the lower lumbar spine and to a lesser extent both SI joints and both hips.  The patient is post healed ORIF of the proximal left femur.  Minimal cortical irregularity is noted involving the left superior and inferior pubic ramus near the junction of the medial and middle thirds.  I see no additional fractures or dislocations.                                       Medications   hydrALAZINE injection 10 mg (10 mg Intravenous Given 3/14/24 1216)     Medical Decision Making  Patient unable to walk with fractured pelvis.  We will have to admit for pain control and probable rehab or skilled nursing facility.    Amount and/or Complexity of Data Reviewed  Radiology: ordered.                                      Clinical Impression:  Final diagnoses:  [S32.82XA] Multiple closed fractures of pelvis without disruption of pelvic ring, initial encounter (Primary)          ED Disposition Condition    Observation Stable                Tristan Hernandez MD  03/14/24 1220

## 2024-03-14 NOTE — NURSING
Patient arrived to the floor via stretcher accompanied by transport personnel. Patient in stable condition and denies further needs at this time.

## 2024-03-14 NOTE — H&P
[facility]  [facility] Medicine  History & Physical    Patient Name: Vickie Rahman  MRN: [id number]  Patient Class: OP- Observation  Admission Date: 3/14/2024  Attending Physician: Simona Blanton MD   Primary Care Provider: Mau Dominique III, MD         Patient information was obtained from patient and ER records.     Subjective:     Principal Problem:<principal problem not specified>    Chief Complaint:   Chief Complaint   Patient presents with    Fall     PRESENTS TO ED PER POV WITH C/O FALL W/O HEAD INJURY 30M PTA. PATIENT STATES WAS TRIPPED BY DOGS AND LANDED ON LT. SIDE. C/O LT HIP PAIN        HPI: 80 yo F with h/o prior left hip fx June 2023, fell after getting tangled up with her large standard poodle pet and tripped landing on her left side and having severe acute onset pain in left hip.  She was seen in ER and found to have 2 pubic rami fractures.  Hip is intact.  She is c/o pain.  PMHX HTN and DM.    Past Medical History:   Diagnosis Date    Coronary artery disease     Diabetes mellitus     Hypercholesteremia     Hypertension        Past Surgical History:   Procedure Laterality Date    BLADDER SURGERY      cardiac stents      EXCISIONAL HEMORRHOIDECTOMY      INTRAMEDULLARY RODDING OF FEMUR Left 6/5/2023    Procedure: INSERTION, INTRAMEDULLARY JAMAL, FEMUR;  Surgeon: Ace Del Cid DO;  Location: [facility];  Service: Orthopedics;  Laterality: Left;  supine hana table synthes c arm    SHOULDER SURGERY         Review of patient's allergies indicates:  No Known Allergies    No current facility-administered medications on file prior to encounter.     Current Outpatient Medications on File Prior to Encounter   Medication Sig    amitriptyline (ELAVIL) 25 MG tablet Take 1 tablet (25 mg total) by mouth every evening.    aspirin (ECOTRIN) 81 MG EC tablet Take 81 mg by mouth once daily.    glimepiride (AMARYL) 2 MG tablet Take 2 mg by mouth 2 (two) times daily.    isosorbide mononitrate (IMDUR)  30 MG 24 hr tablet Take 1 tablet (30 mg total) by mouth once daily.    losartan-hydrochlorothiazide 100-12.5 mg (HYZAAR) 100-12.5 mg Tab Take 1 tablet by mouth.    metFORMIN (GLUCOPHAGE-XR) 500 MG ER 24hr tablet     metoprolol succinate (TOPROL-XL) 25 MG 24 hr tablet Take 1 tablet (25 mg total) by mouth once daily. (Patient taking differently: Take 50 mg by mouth once daily.)    pioglitazone (ACTOS) 15 MG tablet     pravastatin (PRAVACHOL) 40 MG tablet Take 1 tablet (40 mg total) by mouth once daily.    SYNTHROID 88 mcg tablet Take 88 mcg by mouth every morning.    [DISCONTINUED] latanoprost 0.005 % ophthalmic solution Place into both eyes.    [DISCONTINUED] nirmatrelvir-ritonavir (PAXLOVID) 300 mg (150 mg x 2)-100 mg copackaged tablets (EUA) Take 3 tablets by mouth 2 (two) times daily. Each dose contains 2 nirmatrelvir (pink tablets) and 1 ritonavir (white tablet). Take all 3 tablets together    [DISCONTINUED] rivaroxaban (XARELTO) 10 mg Tab Take 1 tablet (10 mg total) by mouth daily with dinner or evening meal. For dvt prevention post surgery     Family History    Family history is unknown by patient.       Tobacco Use    Smoking status: Never    Smokeless tobacco: Never   Substance and Sexual Activity    Alcohol use: Not Currently    Drug use: Not Currently    Sexual activity: Not on file     Review of Systems   Constitutional:  Negative for activity change, appetite change and fever.   Respiratory:  Negative for chest tightness, shortness of breath and wheezing.    Cardiovascular:  Negative for chest pain.   Gastrointestinal:  Negative for abdominal pain, constipation, diarrhea, nausea and vomiting.   Genitourinary:  Negative for dysuria.   Musculoskeletal:  Positive for arthralgias, gait problem, joint swelling and myalgias.   Skin:  Negative for rash and wound.   Neurological:  Positive for weakness. Negative for tremors and headaches.     Objective:     Vital Signs (Most Recent):  Temp: 97.8 °F (36.6 °C)  (03/14/24 1420)  Pulse: 103 (03/14/24 1420)  Resp: 16 (03/14/24 1519)  BP: (!) 166/92 (03/14/24 1420)  SpO2: 96 % (03/14/24 1420) Vital Signs (24h Range):  Temp:  [97.8 °F (36.6 °C)-98.2 °F (36.8 °C)] 97.8 °F (36.6 °C)  Pulse:  [] 103  Resp:  [16-20] 16  SpO2:  [96 %-99 %] 96 %  BP: (156-213)/() 166/92     Weight: 55.5 kg (122 lb 6.4 oz)  Body mass index is 20.37 kg/m².     Physical Exam  Constitutional:       General: She is not in acute distress.     Appearance: Normal appearance. She is normal weight. She is not ill-appearing.   HENT:      Head: Normocephalic and atraumatic.   Eyes:      General: No scleral icterus.     Extraocular Movements: Extraocular movements intact.   Cardiovascular:      Rate and Rhythm: Normal rate and regular rhythm.      Pulses: Normal pulses.      Heart sounds: Normal heart sounds.   Pulmonary:      Effort: Pulmonary effort is normal.      Breath sounds: Normal breath sounds.   Abdominal:      General: Abdomen is flat. Bowel sounds are normal.      Palpations: Abdomen is soft.   Musculoskeletal:         General: Tenderness present.      Right lower leg: No edema.      Left lower leg: No edema.      Comments: Left hip bruising and painful to touch hip and toward medially of pubic area   Skin:     General: Skin is warm and dry.      Capillary Refill: Capillary refill takes less than 2 seconds.      Findings: No erythema, lesion or rash.   Neurological:      General: No focal deficit present.      Mental Status: She is alert and oriented to person, place, and time.   Psychiatric:         Mood and Affect: Mood normal.         Behavior: Behavior normal.         Thought Content: Thought content normal.                Significant Labs: All pertinent labs within the past 24 hours have been reviewed.  CBC:   Recent Labs   Lab 03/14/24  1219   WBC 10.32   HGB 13.0   HCT 37.2        CMP:   Recent Labs   Lab 03/14/24  1219      K 3.4*   CO2 28   BUN 16.0   CREATININE 0.89    CALCIUM 9.1   ALBUMIN 4.3   BILITOT 0.5   ALKPHOS 60   AST 24   ALT 17       Significant Imaging: I have reviewed all pertinent imaging results/findings within the past 24 hours.  Assessment/Plan:     Other fracture of left femur, initial encounter for closed fracture  Pubic rami fractures  Admit for pain control, will consult PT.        VTE Risk Mitigation (From admission, onward)           Ordered     IP VTE HIGH RISK PATIENT  Once         03/14/24 1505     Place sequential compression device  Until discontinued         03/14/24 1505                       On 03/14/2024, patient should be placed in hospital observation services under my care.             Simona Blanton MD  Department of Hospital Medicine  [facility]

## 2024-03-15 LAB
POCT GLUCOSE: 121 MG/DL (ref 70–110)
POCT GLUCOSE: 159 MG/DL (ref 70–110)
POCT GLUCOSE: 218 MG/DL (ref 70–110)
POCT GLUCOSE: 218 MG/DL (ref 70–110)

## 2024-03-15 PROCEDURE — 25000003 PHARM REV CODE 250: Performed by: FAMILY MEDICINE

## 2024-03-15 PROCEDURE — 94761 N-INVAS EAR/PLS OXIMETRY MLT: CPT

## 2024-03-15 PROCEDURE — 11000001 HC ACUTE MED/SURG PRIVATE ROOM

## 2024-03-15 PROCEDURE — 97116 GAIT TRAINING THERAPY: CPT

## 2024-03-15 PROCEDURE — 97161 PT EVAL LOW COMPLEX 20 MIN: CPT

## 2024-03-15 RX ORDER — ALPRAZOLAM 0.25 MG/1
0.25 TABLET ORAL ONCE
Status: COMPLETED | OUTPATIENT
Start: 2024-03-16 | End: 2024-03-15

## 2024-03-15 RX ADMIN — ASPIRIN 81 MG: 81 TABLET, COATED ORAL at 09:03

## 2024-03-15 RX ADMIN — PRAVASTATIN SODIUM 40 MG: 20 TABLET ORAL at 09:03

## 2024-03-15 RX ADMIN — HYDROCODONE BITARTRATE AND ACETAMINOPHEN 1 TABLET: 5; 325 TABLET ORAL at 06:03

## 2024-03-15 RX ADMIN — INSULIN ASPART 1 UNITS: 100 INJECTION, SOLUTION INTRAVENOUS; SUBCUTANEOUS at 09:03

## 2024-03-15 RX ADMIN — LOSARTAN POTASSIUM 100 MG: 50 TABLET, FILM COATED ORAL at 08:03

## 2024-03-15 RX ADMIN — ALPRAZOLAM 0.25 MG: 0.25 TABLET ORAL at 11:03

## 2024-03-15 RX ADMIN — ISOSORBIDE MONONITRATE 30 MG: 30 TABLET, EXTENDED RELEASE ORAL at 08:03

## 2024-03-15 RX ADMIN — LEVOTHYROXINE SODIUM 88 MCG: 88 TABLET ORAL at 06:03

## 2024-03-15 RX ADMIN — HYDROCHLOROTHIAZIDE 12.5 MG: 12.5 TABLET ORAL at 08:03

## 2024-03-15 RX ADMIN — AMITRIPTYLINE HYDROCHLORIDE 25 MG: 25 TABLET, FILM COATED ORAL at 09:03

## 2024-03-15 RX ADMIN — INSULIN ASPART 2 UNITS: 100 INJECTION, SOLUTION INTRAVENOUS; SUBCUTANEOUS at 11:03

## 2024-03-15 RX ADMIN — METOPROLOL SUCCINATE 25 MG: 25 TABLET, EXTENDED RELEASE ORAL at 09:03

## 2024-03-15 NOTE — HOSPITAL COURSE
03/15/2024 pt admitted with pubic rami fracture, worked with PT, c/o pain and weak difficulty walking due to pain, weight bearing as tolerated.    03/16/2024 patient in for non operable pelvic fractures.  She continues to have some pain physical therapy is working with her and we are planning on sending her to [place] [facility].  Repeat some labs tomorrow.  03/17/2024 patient was admitted with non operable pelvic fractures.  She is tolerating physical therapy.  Plan is for her to go to rehab tomorrow.  Continue the physical therapy repeat some labs tomorrow.  03/18/2024 elderly patient admitted with non operable pelvic fractures.  Awaiting rehab placement.   has submitted that to insurance awaiting on their pool.  She is in good spirits everything looks fine.  Tolerating physical therapy.  03/19/2024 brief discharge summary 79-year-old female patient of Dr. Derian Dominique presented after fall found to have multiple non operable pelvic fractures.  Hospital course patient was admitted to the hospital we consulted physical therapy and gave her pain medication.  We recommended she go to [place] [facility] but her insurance denied it.  Patient has opted to go home with home health and home PT.  She has not had any problems doing well tolerating diet and tolerating physical therapy.  We will send her home with 15 doses of Norco 5 mg.  We will send her home with her usual home medications.  She will follow-up with the primary care provider in 1 week.

## 2024-03-15 NOTE — PLAN OF CARE
Physician ordered to consult [facility]. Patient aware of consult and agreed to eval. Referral made to [facility].

## 2024-03-15 NOTE — SUBJECTIVE & OBJECTIVE
Past Medical History:   Diagnosis Date    Coronary artery disease     Diabetes mellitus     Hypercholesteremia     Hypertension        Past Surgical History:   Procedure Laterality Date    BLADDER SURGERY      cardiac stents      EXCISIONAL HEMORRHOIDECTOMY      INTRAMEDULLARY RODDING OF FEMUR Left 6/5/2023    Procedure: INSERTION, INTRAMEDULLARY JAMAL, FEMUR;  Surgeon: Ace Del Cid DO;  Location: [facility];  Service: Orthopedics;  Laterality: Left;  supine hana table synthes c arm    SHOULDER SURGERY         Review of patient's allergies indicates:  No Known Allergies    No current facility-administered medications on file prior to encounter.     Current Outpatient Medications on File Prior to Encounter   Medication Sig    amitriptyline (ELAVIL) 25 MG tablet Take 1 tablet (25 mg total) by mouth every evening.    aspirin (ECOTRIN) 81 MG EC tablet Take 81 mg by mouth nightly.    glimepiride (AMARYL) 2 MG tablet Take 2 mg by mouth 2 (two) times daily.    isosorbide mononitrate (IMDUR) 30 MG 24 hr tablet Take 1 tablet (30 mg total) by mouth once daily.    losartan-hydrochlorothiazide 100-12.5 mg (HYZAAR) 100-12.5 mg Tab Take 1 tablet by mouth.    metFORMIN (GLUCOPHAGE-XR) 500 MG ER 24hr tablet 1,000 mg 2 (two) times a day.    metoprolol succinate (TOPROL-XL) 25 MG 24 hr tablet Take 1 tablet (25 mg total) by mouth once daily. (Patient taking differently: Take 50 mg by mouth nightly.)    pioglitazone (ACTOS) 15 MG tablet Take 15 mg by mouth nightly.    pravastatin (PRAVACHOL) 40 MG tablet Take 1 tablet (40 mg total) by mouth once daily. (Patient taking differently: Take 40 mg by mouth every evening.)    SYNTHROID 88 mcg tablet Take 88 mcg by mouth every morning.     Family History    Family history is unknown by patient.       Tobacco Use    Smoking status: Never    Smokeless tobacco: Never   Substance and Sexual Activity    Alcohol use: Not Currently    Drug use: Not Currently    Sexual activity: Not on file     Review of  Systems   Constitutional:  Negative for activity change, appetite change and fever.   Respiratory:  Negative for chest tightness, shortness of breath and wheezing.    Cardiovascular:  Negative for chest pain.   Gastrointestinal:  Negative for abdominal pain, constipation, diarrhea, nausea and vomiting.   Genitourinary:  Negative for dysuria.   Musculoskeletal:  Positive for arthralgias, gait problem, joint swelling and myalgias.   Skin:  Negative for rash and wound.   Neurological:  Positive for weakness. Negative for tremors and headaches.     Objective:     Vital Signs (Most Recent):  Temp: 98.1 °F (36.7 °C) (03/15/24 1059)  Pulse: 92 (03/15/24 1059)  Resp: 20 (03/15/24 1059)  BP: 108/66 (03/15/24 1059)  SpO2: 95 % (03/15/24 1059) Vital Signs (24h Range):  Temp:  [97.8 °F (36.6 °C)-98.5 °F (36.9 °C)] 98.1 °F (36.7 °C)  Pulse:  [] 92  Resp:  [16-20] 20  SpO2:  [95 %-99 %] 95 %  BP: (108-206)/(63-92) 108/66     Weight: 55.6 kg (122 lb 9.6 oz)  Body mass index is 20.4 kg/m².     Physical Exam  Constitutional:       General: She is not in acute distress.     Appearance: Normal appearance. She is normal weight. She is not ill-appearing.   HENT:      Head: Normocephalic and atraumatic.   Eyes:      General: No scleral icterus.     Extraocular Movements: Extraocular movements intact.   Cardiovascular:      Rate and Rhythm: Normal rate and regular rhythm.      Pulses: Normal pulses.      Heart sounds: Normal heart sounds.   Pulmonary:      Effort: Pulmonary effort is normal.      Breath sounds: Normal breath sounds.   Abdominal:      General: Abdomen is flat. Bowel sounds are normal.      Palpations: Abdomen is soft.   Musculoskeletal:         General: Tenderness present.      Right lower leg: No edema.      Left lower leg: No edema.      Comments: Left hip bruising and painful to touch hip and toward medially of pubic area   Skin:     General: Skin is warm and dry.      Capillary Refill: Capillary refill takes less  than 2 seconds.      Findings: No erythema, lesion or rash.   Neurological:      General: No focal deficit present.      Mental Status: She is alert and oriented to person, place, and time.   Psychiatric:         Mood and Affect: Mood normal.         Behavior: Behavior normal.         Thought Content: Thought content normal.                Significant Labs: All pertinent labs within the past 24 hours have been reviewed.  CBC:   Recent Labs   Lab 03/14/24  1219   WBC 10.32   HGB 13.0   HCT 37.2          CMP:   Recent Labs   Lab 03/14/24  1219      K 3.4*   CO2 28   BUN 16.0   CREATININE 0.89   CALCIUM 9.1   ALBUMIN 4.3   BILITOT 0.5   ALKPHOS 60   AST 24   ALT 17         Significant Imaging: I have reviewed all pertinent imaging results/findings within the past 24 hours.

## 2024-03-15 NOTE — PROGRESS NOTES
[facility]/Surg  [facility] Medicine  Progress Note    Patient Name: Vickie Rahman  MRN: [id number]  Patient Class: OP- Observation   Admission Date: 3/14/2024  Length of Stay: 0 days  Attending Physician: Simona Blanton MD  Primary Care Provider: Mau Dominique III, MD        Subjective:     Principal Problem:<principal problem not specified>        HPI:  80 yo F with h/o prior left hip fx June 2023, fell after getting tangled up with her large standard poodle pet and tripped landing on her left side and having severe acute onset pain in left hip.  She was seen in ER and found to have 2 pubic rami fractures.  Hip is intact.  She is c/o pain.  PMHX HTN and DM.    Overview/Hospital Course:  03/15/2024 pt admitted with pubic rami fracture, worked with PT, c/o pain and weak difficulty walking due to pain, weight bearing as tolerated.      Past Medical History:   Diagnosis Date    Coronary artery disease     Diabetes mellitus     Hypercholesteremia     Hypertension        Past Surgical History:   Procedure Laterality Date    BLADDER SURGERY      cardiac stents      EXCISIONAL HEMORRHOIDECTOMY      INTRAMEDULLARY RODDING OF FEMUR Left 6/5/2023    Procedure: INSERTION, INTRAMEDULLARY JAMAL, FEMUR;  Surgeon: Ace Del Cid DO;  Location: [facility];  Service: Orthopedics;  Laterality: Left;  supine hana table synthes c arm    SHOULDER SURGERY         Review of patient's allergies indicates:  No Known Allergies    No current facility-administered medications on file prior to encounter.     Current Outpatient Medications on File Prior to Encounter   Medication Sig    amitriptyline (ELAVIL) 25 MG tablet Take 1 tablet (25 mg total) by mouth every evening.    aspirin (ECOTRIN) 81 MG EC tablet Take 81 mg by mouth nightly.    glimepiride (AMARYL) 2 MG tablet Take 2 mg by mouth 2 (two) times daily.    isosorbide mononitrate (IMDUR) 30 MG 24 hr tablet Take 1 tablet (30 mg total) by mouth once daily.     losartan-hydrochlorothiazide 100-12.5 mg (HYZAAR) 100-12.5 mg Tab Take 1 tablet by mouth.    metFORMIN (GLUCOPHAGE-XR) 500 MG ER 24hr tablet 1,000 mg 2 (two) times a day.    metoprolol succinate (TOPROL-XL) 25 MG 24 hr tablet Take 1 tablet (25 mg total) by mouth once daily. (Patient taking differently: Take 50 mg by mouth nightly.)    pioglitazone (ACTOS) 15 MG tablet Take 15 mg by mouth nightly.    pravastatin (PRAVACHOL) 40 MG tablet Take 1 tablet (40 mg total) by mouth once daily. (Patient taking differently: Take 40 mg by mouth every evening.)    SYNTHROID 88 mcg tablet Take 88 mcg by mouth every morning.     Family History    Family history is unknown by patient.       Tobacco Use    Smoking status: Never    Smokeless tobacco: Never   Substance and Sexual Activity    Alcohol use: Not Currently    Drug use: Not Currently    Sexual activity: Not on file     Review of Systems   Constitutional:  Negative for activity change, appetite change and fever.   Respiratory:  Negative for chest tightness, shortness of breath and wheezing.    Cardiovascular:  Negative for chest pain.   Gastrointestinal:  Negative for abdominal pain, constipation, diarrhea, nausea and vomiting.   Genitourinary:  Negative for dysuria.   Musculoskeletal:  Positive for arthralgias, gait problem, joint swelling and myalgias.   Skin:  Negative for rash and wound.   Neurological:  Positive for weakness. Negative for tremors and headaches.     Objective:     Vital Signs (Most Recent):  Temp: 98.1 °F (36.7 °C) (03/15/24 1059)  Pulse: 92 (03/15/24 1059)  Resp: 20 (03/15/24 1059)  BP: 108/66 (03/15/24 1059)  SpO2: 95 % (03/15/24 1059) Vital Signs (24h Range):  Temp:  [97.8 °F (36.6 °C)-98.5 °F (36.9 °C)] 98.1 °F (36.7 °C)  Pulse:  [] 92  Resp:  [16-20] 20  SpO2:  [95 %-99 %] 95 %  BP: (108-206)/(63-92) 108/66     Weight: 55.6 kg (122 lb 9.6 oz)  Body mass index is 20.4 kg/m².     Physical Exam  Constitutional:       General: She is not in acute  distress.     Appearance: Normal appearance. She is normal weight. She is not ill-appearing.   HENT:      Head: Normocephalic and atraumatic.   Eyes:      General: No scleral icterus.     Extraocular Movements: Extraocular movements intact.   Cardiovascular:      Rate and Rhythm: Normal rate and regular rhythm.      Pulses: Normal pulses.      Heart sounds: Normal heart sounds.   Pulmonary:      Effort: Pulmonary effort is normal.      Breath sounds: Normal breath sounds.   Abdominal:      General: Abdomen is flat. Bowel sounds are normal.      Palpations: Abdomen is soft.   Musculoskeletal:         General: Tenderness present.      Right lower leg: No edema.      Left lower leg: No edema.      Comments: Left hip bruising and painful to touch hip and toward medially of pubic area   Skin:     General: Skin is warm and dry.      Capillary Refill: Capillary refill takes less than 2 seconds.      Findings: No erythema, lesion or rash.   Neurological:      General: No focal deficit present.      Mental Status: She is alert and oriented to person, place, and time.   Psychiatric:         Mood and Affect: Mood normal.         Behavior: Behavior normal.         Thought Content: Thought content normal.                Significant Labs: All pertinent labs within the past 24 hours have been reviewed.  CBC:   Recent Labs   Lab 03/14/24  1219   WBC 10.32   HGB 13.0   HCT 37.2          CMP:   Recent Labs   Lab 03/14/24  1219      K 3.4*   CO2 28   BUN 16.0   CREATININE 0.89   CALCIUM 9.1   ALBUMIN 4.3   BILITOT 0.5   ALKPHOS 60   AST 24   ALT 17         Significant Imaging: I have reviewed all pertinent imaging results/findings within the past 24 hours.    Assessment/Plan:      Other fracture of left femur, initial encounter for closed fracture  Pubic rami fractures  Admit for pain control, will consult PT.  Consult for rehab placement        VTE Risk Mitigation (From admission, onward)           Ordered     IP VTE  HIGH RISK PATIENT  Once         03/14/24 1505     Place sequential compression device  Until discontinued         03/14/24 1505                    Discharge Planning   JACKIE:      Code Status: Full Code   Is the patient medically ready for discharge?:     Reason for patient still in hospital (select all that apply): Patient trending condition and Treatment  Discharge Plan A: Rehab                  Simona Blanton MD  Department of Hospital Medicine   [facility]/Surg

## 2024-03-15 NOTE — PLAN OF CARE
03/15/24 1010   Discharge Assessment   Assessment Type Discharge Planning Assessment   Confirmed/corrected address, phone number and insurance Yes   Confirmed Demographics Correct on Facesheet   Source of Information patient;family   When was your last doctors appointment?   (less than a month ago with PCP)   Communicated JACKIE with patient/caregiver Yes   Reason For Admission Pubic rami fractures   People in Home child(nilay), adult   Facility Arrived From: Home   Do you expect to return to your current living situation? Other (see comments)  (Pending [facility])   Prior to hospitilization cognitive status: Alert/Oriented   Current cognitive status: Alert/Oriented   Walking or Climbing Stairs Difficulty no   Dressing/Bathing Difficulty no   Equipment Currently Used at Home walker, rolling   Readmission within 30 days? No   Patient currently being followed by outpatient case management? No   Do you currently have service(s) that help you manage your care at home? No   Do you take prescription medications? Yes   Do you have prescription coverage? Yes   Coverage [facility] Managed M[employer] Dual Complete   Do you have any problems affording any of your prescribed medications? No   Is the patient taking medications as prescribed? yes   Who is going to help you get home at discharge? Daughter   How do you get to doctors appointments? family or friend will provide;car, drives self   Are you on dialysis? No   Do you take coumadin? No   Discharge Plan A Rehab   Discharge Plan B Skilled Nursing Facility   DME Needed Upon Discharge  none   Discharge Plan discussed with: Patient   Transition of Care Barriers None   Physical Activity   On average, how many days per week do you engage in moderate to strenuous exercise (like a brisk walk)? 0 days   On average, how many minutes do you engage in exercise at this level? 0 min   Financial Resource Strain   How hard is it for you to pay for the very basics like food, housing,  medical care, and heating? Not hard   Housing Stability   In the last 12 months, was there a time when you were not able to pay the mortgage or rent on time? N   In the last 12 months, how many places have you lived? 1   In the last 12 months, was there a time when you did not have a steady place to sleep or slept in a shelter (including now)? N   Transportation Needs   In the past 12 months, has lack of transportation kept you from medical appointments or from getting medications? no   In the past 12 months, has lack of transportation kept you from meetings, work, or from getting things needed for daily living? No   Food Insecurity   Within the past 12 months, you worried that your food would run out before you got the money to buy more. Never true   Within the past 12 months, the food you bought just didn't last and you didn't have money to get more. Never true   Stress   Do you feel stress - tense, restless, nervous, or anxious, or unable to sleep at night because your mind is troubled all the time - these days? Not at all   Social Connections   In a typical week, how many times do you talk on the phone with family, friends, or neighbors? More than 3   How often do you get together with friends or relatives? More than 3   How often do you attend Druze or Evangelical services? Never   Do you belong to any clubs or organizations such as Druze groups, unions, fraternal or athletic groups, or school groups? No   How often do you attend meetings of the clubs or organizations you belong to? Never   Are you , , , , never , or living with a partner?    Alcohol Use   Q1: How often do you have a drink containing alcohol? Never   Q2: How many drinks containing alcohol do you have on a typical day when you are drinking? None   Q3: How often do you have six or more drinks on one occasion? Never     Inpatient rehab placement referral.  I discussed with patient and daughter at  bedside.  Patient would like to go to [facility] as she has been there before.  [facility]

## 2024-03-15 NOTE — PT/OT/SLP EVAL
Physical Therapy Evaluation    Patient Name:  Vickie Rahman   MRN:  [id number]    Recommendations:     Discharge Recommendations: High Intensity Therapy   Discharge Equipment Recommendations: none   Barriers to discharge:  current medical status    Assessment:     Vickie Rahman is a 79 y.o. female admitted with a medical diagnosis of <principal problem not specified>.  She presents with the following impairments/functional limitations: weakness, impaired endurance, impaired functional mobility, gait instability, impaired balance, decreased lower extremity function, decreased safety awareness, pain     Physical therapy evaluation completed. Patient agreeable to therapy today. Patient presents with inferior and superior pubic rami fractures. Fractures are nondisplaced, so patient will be WBAT on LLE per MD. Patient tolerated supine to sit with mod A to assist with legs off bed. Patient performed sit to stand with min A. Patient able to performed functional gait training x 30 feet with RW with WBAT on LLE. Patient required verbal cues for proper heel strike during stance phase to promote improve gait pattern. Patient returned to chair and tolerated LAQs and ankle pumps. Patient then performed another 15 feet to toilet and back with same assistance. Patient returned to supine per patient request.     Rehab Prognosis: Good; patient would benefit from acute skilled PT services to address these deficits and reach maximum level of function.    Recent Surgery: * No surgery found *      Plan:     During this hospitalization, patient to be seen 5 x/week (5-6x weekly/1-2x daily) to address the identified rehab impairments via therapeutic activities, gait training, therapeutic exercises and progress toward the following goals:    Plan of Care Expires:  04/15/24    Subjective     Chief Complaint: pain  Patient/Family Comments/goals: to get stronger  Pain/Comfort:       Patients cultural, spiritual, Shinto conflicts given the  current situation:      Living Environment:  Lives at home alone  Prior to admission, patients level of function was independent.  Equipment used at home: walker, rolling.  DME owned (not currently used): none.  Upon discharge, patient will have assistance from family.    Objective:     Communicated with nursing prior to session.  Patient found HOB elevated with peripheral IV, day catheter  upon PT entry to room.    General Precautions: Standard, fall  Orthopedic Precautions:LLE weight bearing as tolerated   Braces: N/A  Respiratory Status: Room air    Exams:  RUE Strength: WFL  LUE Strength: WFL  RLE Strength: WFL  LLE Strength: grossly 3-/5    Functional Mobility:  Bed Mobility:     Supine to Sit: minimum assistance and moderate assistance  Sit to Supine: minimum assistance and moderate assistance  Transfers:     Sit to Stand:  minimum assistance with rolling walker  Gait: 30 feet, 15 feet with RW with min A       AM-PAC 6 CLICK MOBILITY  Total Score:        Treatment & Education:  See above    Patient left HOB elevated with all lines intact, call button in reach, bed alarm on, and family present.    GOALS:   Multidisciplinary Problems       Physical Therapy Goals          Problem: Physical Therapy    Goal Priority Disciplines Outcome Goal Variances Interventions   Physical Therapy Goal     PT, PT/OT Ongoing, Progressing     Description: Goals to be met by: discharge     Patient will increase functional independence with mobility by performin. Supine to sit with Stand-by Assistance  2. Sit to stand transfer with Stand-by Assistance  3. Gait  x 75 feet with Stand-by Assistance using Rolling Walker.                          History:     Past Medical History:   Diagnosis Date    Coronary artery disease     Diabetes mellitus     Hypercholesteremia     Hypertension        Past Surgical History:   Procedure Laterality Date    BLADDER SURGERY      cardiac stents      EXCISIONAL HEMORRHOIDECTOMY       INTRAMEDULLARY RODDING OF FEMUR Left 6/5/2023    Procedure: INSERTION, INTRAMEDULLARY JAMAL, FEMUR;  Surgeon: Ace Del Cid DO;  Location: [facility];  Service: Orthopedics;  Laterality: Left;  supine hana table synthes c arm    SHOULDER SURGERY         Time Tracking:     PT Received On: 03/15/24  PT Start Time: 1040     PT Stop Time: 1105  PT Total Time (min): 25 min     Billable Minutes: Evaluation 15 and Gait Training 10      03/15/2024

## 2024-03-16 PROBLEM — I10 HTN (HYPERTENSION): Status: ACTIVE | Noted: 2024-03-16

## 2024-03-16 PROBLEM — E78.00 HYPERCHOLESTEROLEMIA: Status: ACTIVE | Noted: 2024-03-16

## 2024-03-16 PROBLEM — S32.82XA MULTIPLE PELVIC FRACTURES: Status: ACTIVE | Noted: 2024-03-16

## 2024-03-16 PROBLEM — E11.9 TYPE 2 DIABETES MELLITUS: Status: ACTIVE | Noted: 2024-03-16

## 2024-03-16 LAB
ANION GAP SERPL CALC-SCNC: 7 MEQ/L (ref 2–13)
BUN SERPL-MCNC: 15 MG/DL (ref 7–20)
CALCIUM SERPL-MCNC: 8.4 MG/DL (ref 8.4–10.2)
CHLORIDE SERPL-SCNC: 100 MMOL/L (ref 98–110)
CO2 SERPL-SCNC: 25 MMOL/L (ref 21–32)
CREAT SERPL-MCNC: 0.91 MG/DL (ref 0.66–1.25)
CREAT/UREA NIT SERPL: 16 (ref 12–20)
GFR SERPLBLD CREATININE-BSD FMLA CKD-EPI: 64 MLS/MIN/1.73/M2
GLUCOSE SERPL-MCNC: 173 MG/DL (ref 70–115)
POCT GLUCOSE: 165 MG/DL (ref 70–110)
POCT GLUCOSE: 186 MG/DL (ref 70–110)
POCT GLUCOSE: 229 MG/DL (ref 70–110)
POTASSIUM SERPL-SCNC: 3.7 MMOL/L (ref 3.5–5.1)
SODIUM SERPL-SCNC: 132 MMOL/L (ref 135–145)

## 2024-03-16 PROCEDURE — 80048 BASIC METABOLIC PNL TOTAL CA: CPT | Performed by: FAMILY MEDICINE

## 2024-03-16 PROCEDURE — 25000003 PHARM REV CODE 250: Performed by: FAMILY MEDICINE

## 2024-03-16 PROCEDURE — 25000003 PHARM REV CODE 250: Performed by: INTERNAL MEDICINE

## 2024-03-16 PROCEDURE — 94761 N-INVAS EAR/PLS OXIMETRY MLT: CPT

## 2024-03-16 PROCEDURE — 11000001 HC ACUTE MED/SURG PRIVATE ROOM

## 2024-03-16 PROCEDURE — 97116 GAIT TRAINING THERAPY: CPT

## 2024-03-16 PROCEDURE — 36415 COLL VENOUS BLD VENIPUNCTURE: CPT | Performed by: FAMILY MEDICINE

## 2024-03-16 RX ORDER — MUPIROCIN 20 MG/G
OINTMENT TOPICAL 2 TIMES DAILY
Status: DISCONTINUED | OUTPATIENT
Start: 2024-03-16 | End: 2024-03-19 | Stop reason: HOSPADM

## 2024-03-16 RX ADMIN — LOSARTAN POTASSIUM 100 MG: 50 TABLET, FILM COATED ORAL at 08:03

## 2024-03-16 RX ADMIN — LEVOTHYROXINE SODIUM 88 MCG: 88 TABLET ORAL at 06:03

## 2024-03-16 RX ADMIN — HYDROCHLOROTHIAZIDE 12.5 MG: 12.5 TABLET ORAL at 08:03

## 2024-03-16 RX ADMIN — METOPROLOL SUCCINATE 25 MG: 25 TABLET, EXTENDED RELEASE ORAL at 08:03

## 2024-03-16 RX ADMIN — PRAVASTATIN SODIUM 40 MG: 20 TABLET ORAL at 08:03

## 2024-03-16 RX ADMIN — INSULIN ASPART 2 UNITS: 100 INJECTION, SOLUTION INTRAVENOUS; SUBCUTANEOUS at 11:03

## 2024-03-16 RX ADMIN — AMITRIPTYLINE HYDROCHLORIDE 25 MG: 25 TABLET, FILM COATED ORAL at 08:03

## 2024-03-16 RX ADMIN — MUPIROCIN: 20 OINTMENT TOPICAL at 08:03

## 2024-03-16 RX ADMIN — ASPIRIN 81 MG: 81 TABLET, COATED ORAL at 08:03

## 2024-03-16 RX ADMIN — ISOSORBIDE MONONITRATE 30 MG: 30 TABLET, EXTENDED RELEASE ORAL at 08:03

## 2024-03-16 NOTE — PT/OT/SLP PROGRESS
Physical Therapy Treatment    Patient Name:  Vickie Rahman   MRN:  [id number]    Recommendations:     Discharge Recommendations: High Intensity Therapy  Discharge Equipment Recommendations: none  Barriers to discharge:  current medical status    Assessment:     Vickie Rahman is a 79 y.o. female admitted with a medical diagnosis of <principal problem not specified>.  She presents with the following impairments/functional limitations: weakness, impaired endurance, impaired functional mobility, gait instability, impaired balance, decreased lower extremity function, decreased safety awareness, pain     Patient found with HOB elevated. Patient agreeable to therapy today. Patient tolerated supine to sit with min A. Then tolerated sit to stand with min A. Patient tolerated 25 feet of gait training with RW with offweighting of left leg slightly due to pain. Patient returned to bed with min A. Patient repositioned for comfort    Rehab Prognosis: Good; patient would benefit from acute skilled PT services to address these deficits and reach maximum level of function.    Recent Surgery: * No surgery found *      Plan:     During this hospitalization, patient to be seen 5 x/week (5-6x weekly/1-2x daily) to address the identified rehab impairments via therapeutic activities, gait training, therapeutic exercises and progress toward the following goals:    Plan of Care Expires:  04/15/24    Subjective     Chief Complaint: weakness, pain  Patient/Family Comments/goals: to get stronger  Pain/Comfort:         Objective:     Communicated with nursing prior to session.  Patient found HOB elevated with peripheral IV, day catheter upon PT entry to room.     General Precautions: Standard, fall  Orthopedic Precautions: LLE weight bearing as tolerated  Braces: N/A  Respiratory Status: Room air     Functional Mobility:  Bed Mobility:     Supine to Sit: minimum assistance  Sit to Supine: minimum assistance  Transfers:     Sit to Stand:  minimum  assistance with rolling walker  Gait: 25 feet with min A       AM-PAC 6 CLICK MOBILITY          Treatment & Education:  See above    Patient left HOB elevated with all lines intact and call button in reach..    GOALS:   Multidisciplinary Problems       Physical Therapy Goals          Problem: Physical Therapy    Goal Priority Disciplines Outcome Goal Variances Interventions   Physical Therapy Goal     PT, PT/OT Ongoing, Progressing     Description: Goals to be met by: discharge     Patient will increase functional independence with mobility by performin. Supine to sit with Stand-by Assistance  2. Sit to stand transfer with Stand-by Assistance  3. Gait  x 75 feet with Stand-by Assistance using Rolling Walker.                          Time Tracking:     PT Received On: 24  PT Start Time: 1240     PT Stop Time: 1250  PT Total Time (min): 10 min     Billable Minutes: Gait Training 10    Treatment Type: Treatment  PT/PTA: PT           2024

## 2024-03-16 NOTE — PLAN OF CARE
Problem: Adult Inpatient Plan of Care  Goal: Plan of Care Review  Outcome: Ongoing, Progressing  Goal: Patient-Specific Goal (Individualized)  Outcome: Ongoing, Progressing  Goal: Absence of Hospital-Acquired Illness or Injury  Outcome: Ongoing, Progressing  Goal: Optimal Comfort and Wellbeing  Outcome: Ongoing, Progressing  Goal: Readiness for Transition of Care  Outcome: Ongoing, Progressing     Problem: Fall Injury Risk  Goal: Absence of Fall and Fall-Related Injury  Outcome: Ongoing, Progressing     Problem: Pain Acute  Goal: Acceptable Pain Control and Functional Ability  Outcome: Ongoing, Progressing     Problem: Skin Injury Risk Increased  Goal: Skin Health and Integrity  Outcome: Ongoing, Progressing     Problem: Diabetes Comorbidity  Goal: Blood Glucose Level Within Targeted Range  Outcome: Ongoing, Progressing

## 2024-03-16 NOTE — SUBJECTIVE & OBJECTIVE
Past Medical History:   Diagnosis Date    Coronary artery disease     Diabetes mellitus     Hypercholesteremia     Hypertension        Past Surgical History:   Procedure Laterality Date    BLADDER SURGERY      cardiac stents      EXCISIONAL HEMORRHOIDECTOMY      INTRAMEDULLARY RODDING OF FEMUR Left 6/5/2023    Procedure: INSERTION, INTRAMEDULLARY JAMAL, FEMUR;  Surgeon: Ace Del Cid DO;  Location: [facility];  Service: Orthopedics;  Laterality: Left;  supine hana table synthes c arm    SHOULDER SURGERY         Review of patient's allergies indicates:  No Known Allergies    No current facility-administered medications on file prior to encounter.     Current Outpatient Medications on File Prior to Encounter   Medication Sig    amitriptyline (ELAVIL) 25 MG tablet Take 1 tablet (25 mg total) by mouth every evening.    aspirin (ECOTRIN) 81 MG EC tablet Take 81 mg by mouth nightly.    glimepiride (AMARYL) 2 MG tablet Take 2 mg by mouth 2 (two) times daily.    isosorbide mononitrate (IMDUR) 30 MG 24 hr tablet Take 1 tablet (30 mg total) by mouth once daily.    losartan-hydrochlorothiazide 100-12.5 mg (HYZAAR) 100-12.5 mg Tab Take 1 tablet by mouth.    metFORMIN (GLUCOPHAGE-XR) 500 MG ER 24hr tablet 1,000 mg 2 (two) times a day.    metoprolol succinate (TOPROL-XL) 25 MG 24 hr tablet Take 1 tablet (25 mg total) by mouth once daily. (Patient taking differently: Take 50 mg by mouth nightly.)    pioglitazone (ACTOS) 15 MG tablet Take 15 mg by mouth nightly.    pravastatin (PRAVACHOL) 40 MG tablet Take 1 tablet (40 mg total) by mouth once daily. (Patient taking differently: Take 40 mg by mouth every evening.)    SYNTHROID 88 mcg tablet Take 88 mcg by mouth every morning.     Family History    Family history is unknown by patient.       Tobacco Use    Smoking status: Never    Smokeless tobacco: Never   Substance and Sexual Activity    Alcohol use: Not Currently    Drug use: Not Currently    Sexual activity: Not on file     Review of  Systems   Constitutional:  Negative for activity change, appetite change and fever.   Respiratory:  Negative for chest tightness, shortness of breath and wheezing.    Cardiovascular:  Negative for chest pain.   Gastrointestinal:  Negative for abdominal pain, constipation, diarrhea, nausea and vomiting.   Genitourinary:  Negative for dysuria.   Musculoskeletal:  Positive for arthralgias, gait problem, joint swelling and myalgias.   Skin:  Negative for rash and wound.   Neurological:  Positive for weakness. Negative for tremors and headaches.     Objective:     Vital Signs (Most Recent):  Temp: 97.9 °F (36.6 °C) (03/16/24 0725)  Pulse: 80 (03/16/24 0758)  Resp: 18 (03/16/24 0758)  BP: (!) 144/74 (03/16/24 0725)  SpO2: 95 % (03/16/24 0758) Vital Signs (24h Range):  Temp:  [97.2 °F (36.2 °C)-99.4 °F (37.4 °C)] 97.9 °F (36.6 °C)  Pulse:  [] 80  Resp:  [16-20] 18  SpO2:  [95 %-97 %] 95 %  BP: (106-144)/(64-89) 144/74     Weight: 55.3 kg (121 lb 14.6 oz)  Body mass index is 20.29 kg/m².     Physical Exam  Constitutional:       General: She is not in acute distress.     Appearance: Normal appearance. She is normal weight. She is not ill-appearing.   HENT:      Head: Normocephalic and atraumatic.   Eyes:      General: No scleral icterus.     Extraocular Movements: Extraocular movements intact.   Cardiovascular:      Rate and Rhythm: Normal rate and regular rhythm.      Pulses: Normal pulses.      Heart sounds: Normal heart sounds.   Pulmonary:      Effort: Pulmonary effort is normal.      Breath sounds: Normal breath sounds.   Abdominal:      General: Abdomen is flat. Bowel sounds are normal.      Palpations: Abdomen is soft.   Musculoskeletal:         General: Tenderness present.      Right lower leg: No edema.      Left lower leg: No edema.      Comments: Left hip bruising and painful to touch hip and toward medially of pubic area   Skin:     General: Skin is warm and dry.      Capillary Refill: Capillary refill  takes less than 2 seconds.      Findings: No erythema, lesion or rash.   Neurological:      General: No focal deficit present.      Mental Status: She is alert and oriented to person, place, and time.   Psychiatric:         Mood and Affect: Mood normal.         Behavior: Behavior normal.         Thought Content: Thought content normal.                Significant Labs: All pertinent labs within the past 24 hours have been reviewed.  CBC:   Recent Labs   Lab 03/14/24  1219   WBC 10.32   HGB 13.0   HCT 37.2          CMP:   Recent Labs   Lab 03/14/24  1219 03/16/24  0450    132*   K 3.4* 3.7   CO2 28 25   BUN 16.0 15.0   CREATININE 0.89 0.91   CALCIUM 9.1 8.4   ALBUMIN 4.3  --    BILITOT 0.5  --    ALKPHOS 60  --    AST 24  --    ALT 17  --          Significant Imaging: I have reviewed all pertinent imaging results/findings within the past 24 hours.

## 2024-03-16 NOTE — PROGRESS NOTES
[facility]/Surg  [place] [facility] Medicine  Progress Note    Patient Name: Vickie Rahman  MRN: [id number]  Patient Class: IP- Inpatient   Admission Date: 3/14/2024  Length of Stay: 1 days  Attending Physician: Simona Blanton MD  Primary Care Provider: Mau Dominique III, MD        Subjective:     Principal Problem:<principal problem not specified>        HPI:  80 yo F with h/o prior left hip fx June 2023, fell after getting tangled up with her large standard poodle pet and tripped landing on her left side and having severe acute onset pain in left hip.  She was seen in ER and found to have 2 pubic rami fractures.  Hip is intact.  She is c/o pain.  PMHX HTN and DM.    Overview/Hospital Course:  03/15/2024 pt admitted with pubic rami fracture, worked with PT, c/o pain and weak difficulty walking due to pain, weight bearing as tolerated.    03/16/2024 patient in for non operable pelvic fractures.  She continues to have some pain physical therapy is working with her and we are planning on sending her to [place] rehab.  Repeat some labs tomorrow.    Past Medical History:   Diagnosis Date    Coronary artery disease     Diabetes mellitus     Hypercholesteremia     Hypertension        Past Surgical History:   Procedure Laterality Date    BLADDER SURGERY      cardiac stents      EXCISIONAL HEMORRHOIDECTOMY      INTRAMEDULLARY RODDING OF FEMUR Left 6/5/2023    Procedure: INSERTION, INTRAMEDULLARY JAMAL, FEMUR;  Surgeon: Ace Del Cid DO;  Location: [facility];  Service: Orthopedics;  Laterality: Left;  supine hana table synthes c arm    SHOULDER SURGERY         Review of patient's allergies indicates:  No Known Allergies    No current facility-administered medications on file prior to encounter.     Current Outpatient Medications on File Prior to Encounter   Medication Sig    amitriptyline (ELAVIL) 25 MG tablet Take 1 tablet (25 mg total) by mouth every evening.    aspirin (ECOTRIN) 81 MG EC tablet Take 81 mg by  mouth nightly.    glimepiride (AMARYL) 2 MG tablet Take 2 mg by mouth 2 (two) times daily.    isosorbide mononitrate (IMDUR) 30 MG 24 hr tablet Take 1 tablet (30 mg total) by mouth once daily.    losartan-hydrochlorothiazide 100-12.5 mg (HYZAAR) 100-12.5 mg Tab Take 1 tablet by mouth.    metFORMIN (GLUCOPHAGE-XR) 500 MG ER 24hr tablet 1,000 mg 2 (two) times a day.    metoprolol succinate (TOPROL-XL) 25 MG 24 hr tablet Take 1 tablet (25 mg total) by mouth once daily. (Patient taking differently: Take 50 mg by mouth nightly.)    pioglitazone (ACTOS) 15 MG tablet Take 15 mg by mouth nightly.    pravastatin (PRAVACHOL) 40 MG tablet Take 1 tablet (40 mg total) by mouth once daily. (Patient taking differently: Take 40 mg by mouth every evening.)    SYNTHROID 88 mcg tablet Take 88 mcg by mouth every morning.     Family History    Family history is unknown by patient.       Tobacco Use    Smoking status: Never    Smokeless tobacco: Never   Substance and Sexual Activity    Alcohol use: Not Currently    Drug use: Not Currently    Sexual activity: Not on file     Review of Systems   Constitutional:  Negative for activity change, appetite change and fever.   Respiratory:  Negative for chest tightness, shortness of breath and wheezing.    Cardiovascular:  Negative for chest pain.   Gastrointestinal:  Negative for abdominal pain, constipation, diarrhea, nausea and vomiting.   Genitourinary:  Negative for dysuria.   Musculoskeletal:  Positive for arthralgias, gait problem, joint swelling and myalgias.   Skin:  Negative for rash and wound.   Neurological:  Positive for weakness. Negative for tremors and headaches.     Objective:     Vital Signs (Most Recent):  Temp: 97.9 °F (36.6 °C) (03/16/24 0725)  Pulse: 80 (03/16/24 0758)  Resp: 18 (03/16/24 0758)  BP: (!) 144/74 (03/16/24 0725)  SpO2: 95 % (03/16/24 0758) Vital Signs (24h Range):  Temp:  [97.2 °F (36.2 °C)-99.4 °F (37.4 °C)] 97.9 °F (36.6 °C)  Pulse:  [] 80  Resp:   [16-20] 18  SpO2:  [95 %-97 %] 95 %  BP: (106-144)/(64-89) 144/74     Weight: 55.3 kg (121 lb 14.6 oz)  Body mass index is 20.29 kg/m².     Physical Exam  Constitutional:       General: She is not in acute distress.     Appearance: Normal appearance. She is normal weight. She is not ill-appearing.   HENT:      Head: Normocephalic and atraumatic.   Eyes:      General: No scleral icterus.     Extraocular Movements: Extraocular movements intact.   Cardiovascular:      Rate and Rhythm: Normal rate and regular rhythm.      Pulses: Normal pulses.      Heart sounds: Normal heart sounds.   Pulmonary:      Effort: Pulmonary effort is normal.      Breath sounds: Normal breath sounds.   Abdominal:      General: Abdomen is flat. Bowel sounds are normal.      Palpations: Abdomen is soft.   Musculoskeletal:         General: Tenderness present.      Right lower leg: No edema.      Left lower leg: No edema.      Comments: Left hip bruising and painful to touch hip and toward medially of pubic area   Skin:     General: Skin is warm and dry.      Capillary Refill: Capillary refill takes less than 2 seconds.      Findings: No erythema, lesion or rash.   Neurological:      General: No focal deficit present.      Mental Status: She is alert and oriented to person, place, and time.   Psychiatric:         Mood and Affect: Mood normal.         Behavior: Behavior normal.         Thought Content: Thought content normal.                Significant Labs: All pertinent labs within the past 24 hours have been reviewed.  CBC:   Recent Labs   Lab 03/14/24  1219   WBC 10.32   HGB 13.0   HCT 37.2          CMP:   Recent Labs   Lab 03/14/24  1219 03/16/24  0450    132*   K 3.4* 3.7   CO2 28 25   BUN 16.0 15.0   CREATININE 0.89 0.91   CALCIUM 9.1 8.4   ALBUMIN 4.3  --    BILITOT 0.5  --    ALKPHOS 60  --    AST 24  --    ALT 17  --          Significant Imaging: I have reviewed all pertinent imaging results/findings within the past 24  hours.    Assessment/Plan:      Type 2 diabetes mellitus  Patient's FSGs are controlled on current medication regimen.  Last A1c reviewed-   Lab Results   Component Value Date    HGBA1C 6.5 (H) 06/09/2023     Most recent fingerstick glucose reviewed-   Recent Labs   Lab 03/15/24  1119 03/15/24  1609 03/15/24  2007 03/16/24  0722   POCTGLUCOSE 218* 159* 218* 165*     Current correctional scale  High  Maintain anti-hyperglycemic dose as follows-   Antihyperglycemics (From admission, onward)      Start     Stop Route Frequency Ordered    03/14/24 2138  insulin aspart U-100 pen 0-5 Units         -- SubQ Before meals & nightly PRN 03/14/24 2038          Hold Oral hypoglycemics while patient is in the hospital.    Hypercholesterolemia  Low-cholesterol diet.  Continue current medications.      HTN (hypertension)  Chronic, controlled. Latest blood pressure and vitals reviewed-     Temp:  [97.2 °F (36.2 °C)-99.4 °F (37.4 °C)]   Pulse:  []   Resp:  [16-20]   BP: (106-144)/(64-89)   SpO2:  [95 %-97 %] .   Home meds for hypertension were reviewed and noted below.   Hypertension Medications               isosorbide mononitrate (IMDUR) 30 MG 24 hr tablet Take 1 tablet (30 mg total) by mouth once daily.    losartan-hydrochlorothiazide 100-12.5 mg (HYZAAR) 100-12.5 mg Tab Take 1 tablet by mouth.    metoprolol succinate (TOPROL-XL) 25 MG 24 hr tablet Take 1 tablet (25 mg total) by mouth once daily.            While in the hospital, will manage blood pressure as follows; Continue home antihypertensive regimen    Will utilize p.r.n. blood pressure medication only if patient's blood pressure greater than 180/110 and she develops symptoms such as worsening chest pain or shortness of breath.    Multiple pelvic fractures  Physical therapy will work with her.  Partial weight-bearing.  Consult [facility].      Other fracture of left femur, initial encounter for closed fracture  Pubic rami fractures  Admit for pain control, will  consult PT.  Consult for rehab placement        VTE Risk Mitigation (From admission, onward)           Ordered     IP VTE HIGH RISK PATIENT  Once         03/14/24 1505     Place sequential compression device  Until discontinued         03/14/24 1505                    Discharge Planning   JACKIE:      Code Status: Full Code   Is the patient medically ready for discharge?:     Reason for patient still in hospital (select all that apply): Patient unstable  Discharge Plan A: Rehab                  Yann Flores MD  Department of Hospital Medicine   [facility]

## 2024-03-16 NOTE — ASSESSMENT & PLAN NOTE
Patient's FSGs are controlled on current medication regimen.  Last A1c reviewed-   Lab Results   Component Value Date    HGBA1C 6.5 (H) 06/09/2023     Most recent fingerstick glucose reviewed-   Recent Labs   Lab 03/15/24  1119 03/15/24  1609 03/15/24  2007 03/16/24  0722   POCTGLUCOSE 218* 159* 218* 165*     Current correctional scale  High  Maintain anti-hyperglycemic dose as follows-   Antihyperglycemics (From admission, onward)      Start     Stop Route Frequency Ordered    03/14/24 2138  insulin aspart U-100 pen 0-5 Units         -- SubQ Before meals & nightly PRN 03/14/24 2038          Hold Oral hypoglycemics while patient is in the hospital.

## 2024-03-16 NOTE — ASSESSMENT & PLAN NOTE
Chronic, controlled. Latest blood pressure and vitals reviewed-     Temp:  [97.2 °F (36.2 °C)-99.4 °F (37.4 °C)]   Pulse:  []   Resp:  [16-20]   BP: (106-144)/(64-89)   SpO2:  [95 %-97 %] .   Home meds for hypertension were reviewed and noted below.   Hypertension Medications               isosorbide mononitrate (IMDUR) 30 MG 24 hr tablet Take 1 tablet (30 mg total) by mouth once daily.    losartan-hydrochlorothiazide 100-12.5 mg (HYZAAR) 100-12.5 mg Tab Take 1 tablet by mouth.    metoprolol succinate (TOPROL-XL) 25 MG 24 hr tablet Take 1 tablet (25 mg total) by mouth once daily.            While in the hospital, will manage blood pressure as follows; Continue home antihypertensive regimen    Will utilize p.r.n. blood pressure medication only if patient's blood pressure greater than 180/110 and she develops symptoms such as worsening chest pain or shortness of breath.

## 2024-03-17 LAB
ALBUMIN SERPL-MCNC: 3.4 G/DL (ref 3.4–5)
ALBUMIN/GLOB SERPL: 1.6 RATIO
ALP SERPL-CCNC: 58 UNIT/L (ref 50–144)
ALT SERPL-CCNC: 14 UNIT/L (ref 1–45)
ANION GAP SERPL CALC-SCNC: 5 MEQ/L (ref 2–13)
AST SERPL-CCNC: 16 UNIT/L (ref 14–36)
BASOPHILS # BLD AUTO: 0.04 X10(3)/MCL (ref 0.01–0.08)
BASOPHILS NFR BLD AUTO: 0.5 % (ref 0.1–1.2)
BILIRUB SERPL-MCNC: 0.6 MG/DL (ref 0–1)
BUN SERPL-MCNC: 17 MG/DL (ref 7–20)
CALCIUM SERPL-MCNC: 8.6 MG/DL (ref 8.4–10.2)
CHLORIDE SERPL-SCNC: 103 MMOL/L (ref 98–110)
CO2 SERPL-SCNC: 25 MMOL/L (ref 21–32)
CREAT SERPL-MCNC: 0.97 MG/DL (ref 0.66–1.25)
CREAT/UREA NIT SERPL: 18 (ref 12–20)
EOSINOPHIL # BLD AUTO: 0.3 X10(3)/MCL (ref 0.04–0.36)
EOSINOPHIL NFR BLD AUTO: 4 % (ref 0.7–7)
ERYTHROCYTE [DISTWIDTH] IN BLOOD BY AUTOMATED COUNT: 13.2 % (ref 11–14.5)
GFR SERPLBLD CREATININE-BSD FMLA CKD-EPI: 60 MLS/MIN/1.73/M2
GLOBULIN SER-MCNC: 2.1 GM/DL (ref 2–3.9)
GLUCOSE SERPL-MCNC: 190 MG/DL (ref 70–115)
HCT VFR BLD AUTO: 38.2 % (ref 36–48)
HGB BLD-MCNC: 13.3 G/DL (ref 11.8–16)
IMM GRANULOCYTES # BLD AUTO: 0.03 X10(3)/MCL (ref 0–0.03)
IMM GRANULOCYTES NFR BLD AUTO: 0.4 % (ref 0–0.5)
LYMPHOCYTES # BLD AUTO: 2.4 X10(3)/MCL (ref 1.16–3.74)
LYMPHOCYTES NFR BLD AUTO: 32.3 % (ref 20–55)
MAGNESIUM SERPL-MCNC: 1.8 MG/DL (ref 1.8–2.4)
MCH RBC QN AUTO: 30.6 PG (ref 27–34)
MCHC RBC AUTO-ENTMCNC: 34.8 G/DL (ref 31–37)
MCV RBC AUTO: 88 FL (ref 79–99)
MONOCYTES # BLD AUTO: 0.74 X10(3)/MCL (ref 0.24–0.36)
MONOCYTES NFR BLD AUTO: 10 % (ref 4.7–12.5)
NEUTROPHILS # BLD AUTO: 3.92 X10(3)/MCL (ref 1.56–6.13)
NEUTROPHILS NFR BLD AUTO: 52.8 % (ref 37–73)
NRBC BLD AUTO-RTO: 0 %
PLATELET # BLD AUTO: 288 X10(3)/MCL (ref 140–371)
PMV BLD AUTO: 8.8 FL (ref 9.4–12.4)
POCT GLUCOSE: 142 MG/DL (ref 70–110)
POCT GLUCOSE: 173 MG/DL (ref 70–110)
POCT GLUCOSE: 182 MG/DL (ref 70–110)
POCT GLUCOSE: 205 MG/DL (ref 70–110)
POTASSIUM SERPL-SCNC: 3.6 MMOL/L (ref 3.5–5.1)
PROT SERPL-MCNC: 5.5 GM/DL (ref 6.3–8.2)
RBC # BLD AUTO: 4.34 X10(6)/MCL (ref 4–5.1)
SODIUM SERPL-SCNC: 133 MMOL/L (ref 135–145)
WBC # SPEC AUTO: 7.43 X10(3)/MCL (ref 4–11.5)

## 2024-03-17 PROCEDURE — 94761 N-INVAS EAR/PLS OXIMETRY MLT: CPT

## 2024-03-17 PROCEDURE — 83735 ASSAY OF MAGNESIUM: CPT | Performed by: INTERNAL MEDICINE

## 2024-03-17 PROCEDURE — 25000003 PHARM REV CODE 250: Performed by: FAMILY MEDICINE

## 2024-03-17 PROCEDURE — 80053 COMPREHEN METABOLIC PANEL: CPT | Performed by: INTERNAL MEDICINE

## 2024-03-17 PROCEDURE — 36415 COLL VENOUS BLD VENIPUNCTURE: CPT | Performed by: INTERNAL MEDICINE

## 2024-03-17 PROCEDURE — 25000003 PHARM REV CODE 250: Performed by: INTERNAL MEDICINE

## 2024-03-17 PROCEDURE — 11000001 HC ACUTE MED/SURG PRIVATE ROOM

## 2024-03-17 PROCEDURE — 85025 COMPLETE CBC W/AUTO DIFF WBC: CPT | Performed by: INTERNAL MEDICINE

## 2024-03-17 RX ADMIN — PRAVASTATIN SODIUM 40 MG: 20 TABLET ORAL at 09:03

## 2024-03-17 RX ADMIN — LOSARTAN POTASSIUM 100 MG: 50 TABLET, FILM COATED ORAL at 08:03

## 2024-03-17 RX ADMIN — LEVOTHYROXINE SODIUM 88 MCG: 88 TABLET ORAL at 06:03

## 2024-03-17 RX ADMIN — MUPIROCIN: 20 OINTMENT TOPICAL at 08:03

## 2024-03-17 RX ADMIN — METOPROLOL SUCCINATE 25 MG: 25 TABLET, EXTENDED RELEASE ORAL at 09:03

## 2024-03-17 RX ADMIN — HYDROCHLOROTHIAZIDE 12.5 MG: 12.5 TABLET ORAL at 08:03

## 2024-03-17 RX ADMIN — ASPIRIN 81 MG: 81 TABLET, COATED ORAL at 09:03

## 2024-03-17 RX ADMIN — INSULIN ASPART 2 UNITS: 100 INJECTION, SOLUTION INTRAVENOUS; SUBCUTANEOUS at 11:03

## 2024-03-17 RX ADMIN — TRAZODONE HYDROCHLORIDE 25 MG: 50 TABLET ORAL at 09:03

## 2024-03-17 RX ADMIN — AMITRIPTYLINE HYDROCHLORIDE 25 MG: 25 TABLET, FILM COATED ORAL at 09:03

## 2024-03-17 RX ADMIN — ISOSORBIDE MONONITRATE 30 MG: 30 TABLET, EXTENDED RELEASE ORAL at 08:03

## 2024-03-17 NOTE — ASSESSMENT & PLAN NOTE
Patient's FSGs are controlled on current medication regimen.  Last A1c reviewed-   Lab Results   Component Value Date    HGBA1C 6.5 (H) 06/09/2023     Most recent fingerstick glucose reviewed-   Recent Labs   Lab 03/16/24  1104 03/16/24  1620 03/17/24  0727   POCTGLUCOSE 229* 186* 142*       Current correctional scale  High  Maintain anti-hyperglycemic dose as follows-   Antihyperglycemics (From admission, onward)    Start     Stop Route Frequency Ordered    03/14/24 2138  insulin aspart U-100 pen 0-5 Units         -- SubQ Before meals & nightly PRN 03/14/24 2038        Hold Oral hypoglycemics while patient is in the hospital.

## 2024-03-17 NOTE — PROGRESS NOTES
[facility]/Surg  [facility] Medicine  Progress Note    Patient Name: Vickie Rahman  MRN: [id number]  Patient Class: IP- Inpatient   Admission Date: 3/14/2024  Length of Stay: 2 days  Attending Physician: Simona Blanton MD  Primary Care Provider: Mau Dominique III, MD        Subjective:     Principal Problem:<principal problem not specified>        HPI:  78 yo F with h/o prior left hip fx June 2023, fell after getting tangled up with her large standard poodle pet and tripped landing on her left side and having severe acute onset pain in left hip.  She was seen in ER and found to have 2 pubic rami fractures.  Hip is intact.  She is c/o pain.  PMHX HTN and DM.    Overview/Hospital Course:  03/15/2024 pt admitted with pubic rami fracture, worked with PT, c/o pain and weak difficulty walking due to pain, weight bearing as tolerated.    03/16/2024 patient in for non operable pelvic fractures.  She continues to have some pain physical therapy is working with her and we are planning on sending her to [facility] rehab.  Repeat some labs tomorrow.  03/17/2024 patient was admitted with non operable pelvic fractures.  She is tolerating physical therapy.  Plan is for her to go to rehab tomorrow.  Continue the physical therapy repeat some labs tomorrow.    Past Medical History:   Diagnosis Date    Coronary artery disease     Diabetes mellitus     HTN (hypertension) 03/16/2024    Hypercholesteremia     Hypercholesterolemia 03/16/2024    Hypertension     Multiple pelvic fractures 03/16/2024    Type 2 diabetes mellitus 03/16/2024       Past Surgical History:   Procedure Laterality Date    BLADDER SURGERY      cardiac stents      EXCISIONAL HEMORRHOIDECTOMY      INTRAMEDULLARY RODDING OF FEMUR Left 6/5/2023    Procedure: INSERTION, INTRAMEDULLARY JAMAL, FEMUR;  Surgeon: Ace Del Cid DO;  Location: [facility] OR;  Service: Orthopedics;  Laterality: Left;  supine hana table synthes c arm    SHOULDER SURGERY         Review  of patient's allergies indicates:  No Known Allergies    No current facility-administered medications on file prior to encounter.     Current Outpatient Medications on File Prior to Encounter   Medication Sig    amitriptyline (ELAVIL) 25 MG tablet Take 1 tablet (25 mg total) by mouth every evening.    aspirin (ECOTRIN) 81 MG EC tablet Take 81 mg by mouth nightly.    glimepiride (AMARYL) 2 MG tablet Take 2 mg by mouth 2 (two) times daily.    isosorbide mononitrate (IMDUR) 30 MG 24 hr tablet Take 1 tablet (30 mg total) by mouth once daily.    losartan-hydrochlorothiazide 100-12.5 mg (HYZAAR) 100-12.5 mg Tab Take 1 tablet by mouth.    metFORMIN (GLUCOPHAGE-XR) 500 MG ER 24hr tablet 1,000 mg 2 (two) times a day.    metoprolol succinate (TOPROL-XL) 25 MG 24 hr tablet Take 1 tablet (25 mg total) by mouth once daily. (Patient taking differently: Take 50 mg by mouth nightly.)    pioglitazone (ACTOS) 15 MG tablet Take 15 mg by mouth nightly.    pravastatin (PRAVACHOL) 40 MG tablet Take 1 tablet (40 mg total) by mouth once daily. (Patient taking differently: Take 40 mg by mouth every evening.)    SYNTHROID 88 mcg tablet Take 88 mcg by mouth every morning.     Family History    Family history is unknown by patient.       Tobacco Use    Smoking status: Never    Smokeless tobacco: Never   Substance and Sexual Activity    Alcohol use: Not Currently    Drug use: Not Currently    Sexual activity: Not on file     Review of Systems   Constitutional:  Negative for activity change, appetite change and fever.   Respiratory:  Negative for chest tightness, shortness of breath and wheezing.    Cardiovascular:  Negative for chest pain.   Gastrointestinal:  Negative for abdominal pain, constipation, diarrhea, nausea and vomiting.   Genitourinary:  Negative for dysuria.   Musculoskeletal:  Positive for arthralgias, gait problem, joint swelling and myalgias.   Skin:  Negative for rash and wound.   Neurological:  Positive for weakness. Negative  for tremors and headaches.     Objective:     Vital Signs (Most Recent):  Temp: 97.5 °F (36.4 °C) (03/17/24 0753)  Pulse: 70 (03/17/24 0753)  Resp: 18 (03/17/24 0753)  BP: (!) 143/75 (03/17/24 0753)  SpO2: 95 % (03/17/24 0753) Vital Signs (24h Range):  Temp:  [97.5 °F (36.4 °C)-98.3 °F (36.8 °C)] 97.5 °F (36.4 °C)  Pulse:  [70-97] 70  Resp:  [16-18] 18  SpO2:  [93 %-96 %] 95 %  BP: (115-143)/(62-75) 143/75     Weight: 55.5 kg (122 lb 4.8 oz)  Body mass index is 20.35 kg/m².     Physical Exam  Constitutional:       General: She is not in acute distress.     Appearance: Normal appearance. She is normal weight. She is not ill-appearing.   HENT:      Head: Normocephalic and atraumatic.   Eyes:      General: No scleral icterus.     Extraocular Movements: Extraocular movements intact.   Cardiovascular:      Rate and Rhythm: Normal rate and regular rhythm.      Pulses: Normal pulses.      Heart sounds: Normal heart sounds.   Pulmonary:      Effort: Pulmonary effort is normal.      Breath sounds: Normal breath sounds.   Abdominal:      General: Abdomen is flat. Bowel sounds are normal.      Palpations: Abdomen is soft.   Musculoskeletal:         General: Tenderness present.      Right lower leg: No edema.      Left lower leg: No edema.      Comments: Left hip bruising and painful to touch hip and toward medially of pubic area   Skin:     General: Skin is warm and dry.      Capillary Refill: Capillary refill takes less than 2 seconds.      Findings: No erythema, lesion or rash.   Neurological:      General: No focal deficit present.      Mental Status: She is alert and oriented to person, place, and time.   Psychiatric:         Mood and Affect: Mood normal.         Behavior: Behavior normal.         Thought Content: Thought content normal.                Significant Labs: All pertinent labs within the past 24 hours have been reviewed.  CBC:   Recent Labs   Lab 03/17/24  0423   WBC 7.43   HGB 13.3   HCT 38.2           CMP:   Recent Labs   Lab 03/16/24  0450 03/17/24  0423   * 133*   K 3.7 3.6   CO2 25 25   BUN 15.0 17.0   CREATININE 0.91 0.97   CALCIUM 8.4 8.6   ALBUMIN  --  3.4   BILITOT  --  0.6   ALKPHOS  --  58   AST  --  16   ALT  --  14         Significant Imaging: I have reviewed all pertinent imaging results/findings within the past 24 hours.    Assessment/Plan:      Type 2 diabetes mellitus  Patient's FSGs are controlled on current medication regimen.  Last A1c reviewed-   Lab Results   Component Value Date    HGBA1C 6.5 (H) 06/09/2023     Most recent fingerstick glucose reviewed-   Recent Labs   Lab 03/16/24  1104 03/16/24  1620 03/17/24  0727   POCTGLUCOSE 229* 186* 142*       Current correctional scale  High  Maintain anti-hyperglycemic dose as follows-   Antihyperglycemics (From admission, onward)      Start     Stop Route Frequency Ordered    03/14/24 2138  insulin aspart U-100 pen 0-5 Units         -- SubQ Before meals & nightly PRN 03/14/24 2038          Hold Oral hypoglycemics while patient is in the hospital.    Hypercholesterolemia  Low-cholesterol diet.  Continue current medications.      HTN (hypertension)  Chronic, controlled. Latest blood pressure and vitals reviewed-     Temp:  [97.5 °F (36.4 °C)-98.3 °F (36.8 °C)]   Pulse:  [70-97]   Resp:  [16-18]   BP: (115-143)/(62-75)   SpO2:  [93 %-96 %] .   Home meds for hypertension were reviewed and noted below.   Hypertension Medications               isosorbide mononitrate (IMDUR) 30 MG 24 hr tablet Take 1 tablet (30 mg total) by mouth once daily.    losartan-hydrochlorothiazide 100-12.5 mg (HYZAAR) 100-12.5 mg Tab Take 1 tablet by mouth.    metoprolol succinate (TOPROL-XL) 25 MG 24 hr tablet Take 1 tablet (25 mg total) by mouth once daily.            While in the hospital, will manage blood pressure as follows; Continue home antihypertensive regimen    Will utilize p.r.n. blood pressure medication only if patient's blood pressure greater than  180/110 and she develops symptoms such as worsening chest pain or shortness of breath.    Multiple pelvic fractures  Physical therapy will work with her.  Partial weight-bearing.  Consult [place] rehab.      Other fracture of left femur, initial encounter for closed fracture  Pubic rami fractures  Admit for pain control, will consult PT.  Consult for rehab placement        VTE Risk Mitigation (From admission, onward)           Ordered     IP VTE HIGH RISK PATIENT  Once         03/14/24 1505     Place sequential compression device  Until discontinued         03/14/24 1505                    Discharge Planning   JACKIE:      Code Status: Full Code   Is the patient medically ready for discharge?:     Reason for patient still in hospital (select all that apply): Patient unstable  Discharge Plan A: Rehab                  Yann Flores MD  Department of Hospital Medicine   [facility]/Surg

## 2024-03-17 NOTE — SUBJECTIVE & OBJECTIVE
Past Medical History:   Diagnosis Date    Coronary artery disease     Diabetes mellitus     HTN (hypertension) 03/16/2024    Hypercholesteremia     Hypercholesterolemia 03/16/2024    Hypertension     Multiple pelvic fractures 03/16/2024    Type 2 diabetes mellitus 03/16/2024       Past Surgical History:   Procedure Laterality Date    BLADDER SURGERY      cardiac stents      EXCISIONAL HEMORRHOIDECTOMY      INTRAMEDULLARY RODDING OF FEMUR Left 6/5/2023    Procedure: INSERTION, INTRAMEDULLARY JAMAL, FEMUR;  Surgeon: Ace Del Cid DO;  Location: [facility];  Service: Orthopedics;  Laterality: Left;  supine hana table synthes c arm    SHOULDER SURGERY         Review of patient's allergies indicates:  No Known Allergies    No current facility-administered medications on file prior to encounter.     Current Outpatient Medications on File Prior to Encounter   Medication Sig    amitriptyline (ELAVIL) 25 MG tablet Take 1 tablet (25 mg total) by mouth every evening.    aspirin (ECOTRIN) 81 MG EC tablet Take 81 mg by mouth nightly.    glimepiride (AMARYL) 2 MG tablet Take 2 mg by mouth 2 (two) times daily.    isosorbide mononitrate (IMDUR) 30 MG 24 hr tablet Take 1 tablet (30 mg total) by mouth once daily.    losartan-hydrochlorothiazide 100-12.5 mg (HYZAAR) 100-12.5 mg Tab Take 1 tablet by mouth.    metFORMIN (GLUCOPHAGE-XR) 500 MG ER 24hr tablet 1,000 mg 2 (two) times a day.    metoprolol succinate (TOPROL-XL) 25 MG 24 hr tablet Take 1 tablet (25 mg total) by mouth once daily. (Patient taking differently: Take 50 mg by mouth nightly.)    pioglitazone (ACTOS) 15 MG tablet Take 15 mg by mouth nightly.    pravastatin (PRAVACHOL) 40 MG tablet Take 1 tablet (40 mg total) by mouth once daily. (Patient taking differently: Take 40 mg by mouth every evening.)    SYNTHROID 88 mcg tablet Take 88 mcg by mouth every morning.     Family History    Family history is unknown by patient.       Tobacco Use    Smoking status: Never    Smokeless  tobacco: Never   Substance and Sexual Activity    Alcohol use: Not Currently    Drug use: Not Currently    Sexual activity: Not on file     Review of Systems   Constitutional:  Negative for activity change, appetite change and fever.   Respiratory:  Negative for chest tightness, shortness of breath and wheezing.    Cardiovascular:  Negative for chest pain.   Gastrointestinal:  Negative for abdominal pain, constipation, diarrhea, nausea and vomiting.   Genitourinary:  Negative for dysuria.   Musculoskeletal:  Positive for arthralgias, gait problem, joint swelling and myalgias.   Skin:  Negative for rash and wound.   Neurological:  Positive for weakness. Negative for tremors and headaches.     Objective:     Vital Signs (Most Recent):  Temp: 97.5 °F (36.4 °C) (03/17/24 0753)  Pulse: 70 (03/17/24 0753)  Resp: 18 (03/17/24 0753)  BP: (!) 143/75 (03/17/24 0753)  SpO2: 95 % (03/17/24 0753) Vital Signs (24h Range):  Temp:  [97.5 °F (36.4 °C)-98.3 °F (36.8 °C)] 97.5 °F (36.4 °C)  Pulse:  [70-97] 70  Resp:  [16-18] 18  SpO2:  [93 %-96 %] 95 %  BP: (115-143)/(62-75) 143/75     Weight: 55.5 kg (122 lb 4.8 oz)  Body mass index is 20.35 kg/m².     Physical Exam  Constitutional:       General: She is not in acute distress.     Appearance: Normal appearance. She is normal weight. She is not ill-appearing.   HENT:      Head: Normocephalic and atraumatic.   Eyes:      General: No scleral icterus.     Extraocular Movements: Extraocular movements intact.   Cardiovascular:      Rate and Rhythm: Normal rate and regular rhythm.      Pulses: Normal pulses.      Heart sounds: Normal heart sounds.   Pulmonary:      Effort: Pulmonary effort is normal.      Breath sounds: Normal breath sounds.   Abdominal:      General: Abdomen is flat. Bowel sounds are normal.      Palpations: Abdomen is soft.   Musculoskeletal:         General: Tenderness present.      Right lower leg: No edema.      Left lower leg: No edema.      Comments: Left hip  bruising and painful to touch hip and toward medially of pubic area   Skin:     General: Skin is warm and dry.      Capillary Refill: Capillary refill takes less than 2 seconds.      Findings: No erythema, lesion or rash.   Neurological:      General: No focal deficit present.      Mental Status: She is alert and oriented to person, place, and time.   Psychiatric:         Mood and Affect: Mood normal.         Behavior: Behavior normal.         Thought Content: Thought content normal.                Significant Labs: All pertinent labs within the past 24 hours have been reviewed.  CBC:   Recent Labs   Lab 03/17/24  0423   WBC 7.43   HGB 13.3   HCT 38.2          CMP:   Recent Labs   Lab 03/16/24  0450 03/17/24  0423   * 133*   K 3.7 3.6   CO2 25 25   BUN 15.0 17.0   CREATININE 0.91 0.97   CALCIUM 8.4 8.6   ALBUMIN  --  3.4   BILITOT  --  0.6   ALKPHOS  --  58   AST  --  16   ALT  --  14         Significant Imaging: I have reviewed all pertinent imaging results/findings within the past 24 hours.

## 2024-03-17 NOTE — ASSESSMENT & PLAN NOTE
Chronic, controlled. Latest blood pressure and vitals reviewed-     Temp:  [97.5 °F (36.4 °C)-98.3 °F (36.8 °C)]   Pulse:  [70-97]   Resp:  [16-18]   BP: (115-143)/(62-75)   SpO2:  [93 %-96 %] .   Home meds for hypertension were reviewed and noted below.   Hypertension Medications               isosorbide mononitrate (IMDUR) 30 MG 24 hr tablet Take 1 tablet (30 mg total) by mouth once daily.    losartan-hydrochlorothiazide 100-12.5 mg (HYZAAR) 100-12.5 mg Tab Take 1 tablet by mouth.    metoprolol succinate (TOPROL-XL) 25 MG 24 hr tablet Take 1 tablet (25 mg total) by mouth once daily.            While in the hospital, will manage blood pressure as follows; Continue home antihypertensive regimen    Will utilize p.r.n. blood pressure medication only if patient's blood pressure greater than 180/110 and she develops symptoms such as worsening chest pain or shortness of breath.

## 2024-03-18 LAB
ALBUMIN SERPL-MCNC: 3.4 G/DL (ref 3.4–5)
ALBUMIN/GLOB SERPL: 1.5 RATIO
ALP SERPL-CCNC: 68 UNIT/L (ref 50–144)
ALT SERPL-CCNC: 17 UNIT/L (ref 1–45)
ANION GAP SERPL CALC-SCNC: 7 MEQ/L (ref 2–13)
AST SERPL-CCNC: 20 UNIT/L (ref 14–36)
BASOPHILS # BLD AUTO: 0.06 X10(3)/MCL (ref 0.01–0.08)
BASOPHILS NFR BLD AUTO: 0.7 % (ref 0.1–1.2)
BILIRUB SERPL-MCNC: 0.8 MG/DL (ref 0–1)
BUN SERPL-MCNC: 17 MG/DL (ref 7–20)
CALCIUM SERPL-MCNC: 8.6 MG/DL (ref 8.4–10.2)
CHLORIDE SERPL-SCNC: 101 MMOL/L (ref 98–110)
CO2 SERPL-SCNC: 24 MMOL/L (ref 21–32)
CREAT SERPL-MCNC: 0.87 MG/DL (ref 0.66–1.25)
CREAT/UREA NIT SERPL: 20 (ref 12–20)
EOSINOPHIL # BLD AUTO: 0.33 X10(3)/MCL (ref 0.04–0.36)
EOSINOPHIL NFR BLD AUTO: 3.7 % (ref 0.7–7)
ERYTHROCYTE [DISTWIDTH] IN BLOOD BY AUTOMATED COUNT: 13 % (ref 11–14.5)
GFR SERPLBLD CREATININE-BSD FMLA CKD-EPI: 68 MLS/MIN/1.73/M2
GLOBULIN SER-MCNC: 2.3 GM/DL (ref 2–3.9)
GLUCOSE SERPL-MCNC: 197 MG/DL (ref 70–115)
HCT VFR BLD AUTO: 39.5 % (ref 36–48)
HGB BLD-MCNC: 13.7 G/DL (ref 11.8–16)
IMM GRANULOCYTES # BLD AUTO: 0.04 X10(3)/MCL (ref 0–0.03)
IMM GRANULOCYTES NFR BLD AUTO: 0.5 % (ref 0–0.5)
LYMPHOCYTES # BLD AUTO: 2.42 X10(3)/MCL (ref 1.16–3.74)
LYMPHOCYTES NFR BLD AUTO: 27.3 % (ref 20–55)
MAGNESIUM SERPL-MCNC: 2 MG/DL (ref 1.8–2.4)
MCH RBC QN AUTO: 30.4 PG (ref 27–34)
MCHC RBC AUTO-ENTMCNC: 34.7 G/DL (ref 31–37)
MCV RBC AUTO: 87.8 FL (ref 79–99)
MONOCYTES # BLD AUTO: 0.89 X10(3)/MCL (ref 0.24–0.36)
MONOCYTES NFR BLD AUTO: 10 % (ref 4.7–12.5)
NEUTROPHILS # BLD AUTO: 5.13 X10(3)/MCL (ref 1.56–6.13)
NEUTROPHILS NFR BLD AUTO: 57.8 % (ref 37–73)
NRBC BLD AUTO-RTO: 0 %
PLATELET # BLD AUTO: 283 X10(3)/MCL (ref 140–371)
PMV BLD AUTO: 8.7 FL (ref 9.4–12.4)
POCT GLUCOSE: 177 MG/DL (ref 70–110)
POCT GLUCOSE: 178 MG/DL (ref 70–110)
POCT GLUCOSE: 242 MG/DL (ref 70–110)
POCT GLUCOSE: 273 MG/DL (ref 70–110)
POTASSIUM SERPL-SCNC: 3.8 MMOL/L (ref 3.5–5.1)
PROT SERPL-MCNC: 5.7 GM/DL (ref 6.3–8.2)
RBC # BLD AUTO: 4.5 X10(6)/MCL (ref 4–5.1)
SODIUM SERPL-SCNC: 132 MMOL/L (ref 135–145)
WBC # SPEC AUTO: 8.87 X10(3)/MCL (ref 4–11.5)

## 2024-03-18 PROCEDURE — 97116 GAIT TRAINING THERAPY: CPT

## 2024-03-18 PROCEDURE — 94761 N-INVAS EAR/PLS OXIMETRY MLT: CPT

## 2024-03-18 PROCEDURE — 25000003 PHARM REV CODE 250: Performed by: FAMILY MEDICINE

## 2024-03-18 PROCEDURE — 97530 THERAPEUTIC ACTIVITIES: CPT

## 2024-03-18 PROCEDURE — 83735 ASSAY OF MAGNESIUM: CPT | Performed by: INTERNAL MEDICINE

## 2024-03-18 PROCEDURE — 25000003 PHARM REV CODE 250: Performed by: INTERNAL MEDICINE

## 2024-03-18 PROCEDURE — 11000001 HC ACUTE MED/SURG PRIVATE ROOM

## 2024-03-18 PROCEDURE — 36415 COLL VENOUS BLD VENIPUNCTURE: CPT | Performed by: INTERNAL MEDICINE

## 2024-03-18 PROCEDURE — 85025 COMPLETE CBC W/AUTO DIFF WBC: CPT | Performed by: INTERNAL MEDICINE

## 2024-03-18 PROCEDURE — 80053 COMPREHEN METABOLIC PANEL: CPT | Performed by: INTERNAL MEDICINE

## 2024-03-18 RX ORDER — METFORMIN HYDROCHLORIDE 500 MG/1
500 TABLET, EXTENDED RELEASE ORAL 2 TIMES DAILY WITH MEALS
Status: DISCONTINUED | OUTPATIENT
Start: 2024-03-18 | End: 2024-03-19

## 2024-03-18 RX ADMIN — LOSARTAN POTASSIUM 100 MG: 50 TABLET, FILM COATED ORAL at 08:03

## 2024-03-18 RX ADMIN — AMITRIPTYLINE HYDROCHLORIDE 25 MG: 25 TABLET, FILM COATED ORAL at 08:03

## 2024-03-18 RX ADMIN — INSULIN ASPART 3 UNITS: 100 INJECTION, SOLUTION INTRAVENOUS; SUBCUTANEOUS at 11:03

## 2024-03-18 RX ADMIN — LEVOTHYROXINE SODIUM 88 MCG: 88 TABLET ORAL at 06:03

## 2024-03-18 RX ADMIN — ISOSORBIDE MONONITRATE 30 MG: 30 TABLET, EXTENDED RELEASE ORAL at 08:03

## 2024-03-18 RX ADMIN — METFORMIN HYDROCHLORIDE 500 MG: 500 TABLET, EXTENDED RELEASE ORAL at 04:03

## 2024-03-18 RX ADMIN — PRAVASTATIN SODIUM 40 MG: 20 TABLET ORAL at 08:03

## 2024-03-18 RX ADMIN — HYDROCODONE BITARTRATE AND ACETAMINOPHEN 1 TABLET: 5; 325 TABLET ORAL at 06:03

## 2024-03-18 RX ADMIN — MUPIROCIN: 20 OINTMENT TOPICAL at 08:03

## 2024-03-18 RX ADMIN — METOPROLOL SUCCINATE 25 MG: 25 TABLET, EXTENDED RELEASE ORAL at 08:03

## 2024-03-18 RX ADMIN — INSULIN ASPART 1 UNITS: 100 INJECTION, SOLUTION INTRAVENOUS; SUBCUTANEOUS at 08:03

## 2024-03-18 RX ADMIN — ASPIRIN 81 MG: 81 TABLET, COATED ORAL at 08:03

## 2024-03-18 RX ADMIN — TRAZODONE HYDROCHLORIDE 25 MG: 50 TABLET ORAL at 08:03

## 2024-03-18 RX ADMIN — HYDROCHLOROTHIAZIDE 12.5 MG: 12.5 TABLET ORAL at 08:03

## 2024-03-18 NOTE — SUBJECTIVE & OBJECTIVE
Past Medical History:   Diagnosis Date    Coronary artery disease     Diabetes mellitus     HTN (hypertension) 03/16/2024    Hypercholesteremia     Hypercholesterolemia 03/16/2024    Hypertension     Multiple pelvic fractures 03/16/2024    Type 2 diabetes mellitus 03/16/2024       Past Surgical History:   Procedure Laterality Date    BLADDER SURGERY      cardiac stents      EXCISIONAL HEMORRHOIDECTOMY      INTRAMEDULLARY RODDING OF FEMUR Left 6/5/2023    Procedure: INSERTION, INTRAMEDULLARY JAMAL, FEMUR;  Surgeon: Ace Del Cid DO;  [facility];  Service: Orthopedics;  Laterality: Left;  supine hana table synthes c arm    SHOULDER SURGERY         Review of patient's allergies indicates:  No Known Allergies    No current facility-administered medications on file prior to encounter.     Current Outpatient Medications on File Prior to Encounter   Medication Sig    amitriptyline (ELAVIL) 25 MG tablet Take 1 tablet (25 mg total) by mouth every evening.    aspirin (ECOTRIN) 81 MG EC tablet Take 81 mg by mouth nightly.    glimepiride (AMARYL) 2 MG tablet Take 2 mg by mouth 2 (two) times daily.    isosorbide mononitrate (IMDUR) 30 MG 24 hr tablet Take 1 tablet (30 mg total) by mouth once daily.    losartan-hydrochlorothiazide 100-12.5 mg (HYZAAR) 100-12.5 mg Tab Take 1 tablet by mouth.    metFORMIN (GLUCOPHAGE-XR) 500 MG ER 24hr tablet 1,000 mg 2 (two) times a day.    metoprolol succinate (TOPROL-XL) 25 MG 24 hr tablet Take 1 tablet (25 mg total) by mouth once daily. (Patient taking differently: Take 50 mg by mouth nightly.)    pioglitazone (ACTOS) 15 MG tablet Take 15 mg by mouth nightly.    pravastatin (PRAVACHOL) 40 MG tablet Take 1 tablet (40 mg total) by mouth once daily. (Patient taking differently: Take 40 mg by mouth every evening.)    SYNTHROID 88 mcg tablet Take 88 mcg by mouth every morning.     Family History    Family history is unknown by patient.       Tobacco Use    Smoking status: Never    Smokeless  tobacco: Never   Substance and Sexual Activity    Alcohol use: Not Currently    Drug use: Not Currently    Sexual activity: Not on file     Review of Systems   Constitutional:  Negative for activity change, appetite change and fever.   Respiratory:  Negative for chest tightness, shortness of breath and wheezing.    Cardiovascular:  Negative for chest pain.   Gastrointestinal:  Negative for abdominal pain, constipation, diarrhea, nausea and vomiting.   Genitourinary:  Negative for dysuria.   Musculoskeletal:  Positive for arthralgias, gait problem, joint swelling and myalgias.   Skin:  Negative for rash and wound.   Neurological:  Positive for weakness. Negative for tremors and headaches.     Objective:     Vital Signs (Most Recent):  Temp: 97.4 °F (36.3 °C) (03/18/24 1501)  Pulse: 90 (03/18/24 1501)  Resp: 20 (03/18/24 1501)  BP: (!) 109/58 (03/18/24 1501)  SpO2: (!) 92 % (03/18/24 1501) Vital Signs (24h Range):  Temp:  [97.1 °F (36.2 °C)-98.7 °F (37.1 °C)] 97.4 °F (36.3 °C)  Pulse:  [] 90  Resp:  [18-20] 20  SpO2:  [92 %-97 %] 92 %  BP: ()/(39-89) 109/58     Weight: 55.7 kg (122 lb 12.7 oz)  Body mass index is 20.43 kg/m².     Physical Exam  Constitutional:       General: She is not in acute distress.     Appearance: Normal appearance. She is normal weight. She is not ill-appearing.   HENT:      Head: Normocephalic and atraumatic.   Eyes:      General: No scleral icterus.     Extraocular Movements: Extraocular movements intact.   Cardiovascular:      Rate and Rhythm: Normal rate and regular rhythm.      Pulses: Normal pulses.      Heart sounds: Normal heart sounds.   Pulmonary:      Effort: Pulmonary effort is normal.      Breath sounds: Normal breath sounds.   Abdominal:      General: Abdomen is flat. Bowel sounds are normal.      Palpations: Abdomen is soft.   Musculoskeletal:         General: Tenderness present.      Right lower leg: No edema.      Left lower leg: No edema.      Comments: Left hip  bruising and painful to touch hip and toward medially of pubic area   Skin:     General: Skin is warm and dry.      Capillary Refill: Capillary refill takes less than 2 seconds.      Findings: No erythema, lesion or rash.   Neurological:      General: No focal deficit present.      Mental Status: She is alert and oriented to person, place, and time.   Psychiatric:         Mood and Affect: Mood normal.         Behavior: Behavior normal.         Thought Content: Thought content normal.                Significant Labs: All pertinent labs within the past 24 hours have been reviewed.  CBC:   Recent Labs   Lab 03/17/24  0423 03/18/24  0512   WBC 7.43 8.87   HGB 13.3 13.7   HCT 38.2 39.5    283       CMP:   Recent Labs   Lab 03/17/24  0423 03/18/24  0512   * 132*   K 3.6 3.8   CO2 25 24   BUN 17.0 17.0   CREATININE 0.97 0.87   CALCIUM 8.6 8.6   ALBUMIN 3.4 3.4   BILITOT 0.6 0.8   ALKPHOS 58 68   AST 16 20   ALT 14 17         Significant Imaging: I have reviewed all pertinent imaging results/findings within the past 24 hours.

## 2024-03-18 NOTE — NURSING
8500-Bladder training initiated. Educated on process. Voiced understanding.       7723-day removed per protocol

## 2024-03-18 NOTE — PT/OT/SLP PROGRESS
Physical Therapy Treatment    Patient Name:  Vickie Rahman   MRN:  [id number]    Recommendations:     Discharge Recommendations: High Intensity Therapy  Discharge Equipment Recommendations: none  Barriers to discharge:  current medical status    Assessment:     Vickie Rahman is a 79 y.o. female admitted with a medical diagnosis of <principal problem not specified>.  She presents with the following impairments/functional limitations: weakness, impaired endurance, impaired functional mobility, gait instability, impaired balance, decreased lower extremity function, decreased safety awareness, pain     Patient found with HOB elevated. Patient agreeable to therapy today. Patient tolerated supine to sit with min A. While sitting EOB, patient performed BLE therex, then returned to supine with min A. Patient repositioned for comfort.    Rehab Prognosis: Good; patient would benefit from acute skilled PT services to address these deficits and reach maximum level of function.    Recent Surgery: * No surgery found *      Plan:     During this hospitalization, patient to be seen 5 x/week (5-6x weekly/1-2x daily) to address the identified rehab impairments via therapeutic activities, gait training, therapeutic exercises and progress toward the following goals:    Plan of Care Expires:  04/15/24    Subjective     Chief Complaint: weakness, pain  Patient/Family Comments/goals: to get stronger  Pain/Comfort:         Objective:     Communicated with nursing prior to session.  Patient found HOB elevated with peripheral IV, day catheter upon PT entry to room.     General Precautions: Standard, fall  Orthopedic Precautions: LLE weight bearing as tolerated  Braces: N/A  Respiratory Status: Room air     Functional Mobility:  Bed Mobility:     Supine to Sit: minimum assistance  Sit to Supine: minimum assistance  Transfers:     Sit to Stand:  minimum assistance with rolling walker      AM-PAC 6 CLICK MOBILITY          Treatment &  Education:  See above    Patient left HOB elevated with all lines intact and call button in reach..    GOALS:   Multidisciplinary Problems       Physical Therapy Goals          Problem: Physical Therapy    Goal Priority Disciplines Outcome Goal Variances Interventions   Physical Therapy Goal     PT, PT/OT Ongoing, Progressing     Description: Goals to be met by: discharge     Patient will increase functional independence with mobility by performin. Supine to sit with Stand-by Assistance  2. Sit to stand transfer with Stand-by Assistance  3. Gait  x 75 feet with Stand-by Assistance using Rolling Walker.                          Time Tracking:     PT Received On: 24  PT Start Time: 1435     PT Stop Time: 1450  PT Total Time (min): 15 min     Billable Minutes: Therapeutic Activity 15    Treatment Type: Treatment  PT/PTA: PT           2024

## 2024-03-18 NOTE — ASSESSMENT & PLAN NOTE
Chronic, controlled. Latest blood pressure and vitals reviewed-     Temp:  [97.1 °F (36.2 °C)-98.7 °F (37.1 °C)]   Pulse:  []   Resp:  [18-20]   BP: ()/(39-89)   SpO2:  [92 %-97 %] .   Home meds for hypertension were reviewed and noted below.   Hypertension Medications               isosorbide mononitrate (IMDUR) 30 MG 24 hr tablet Take 1 tablet (30 mg total) by mouth once daily.    losartan-hydrochlorothiazide 100-12.5 mg (HYZAAR) 100-12.5 mg Tab Take 1 tablet by mouth.    metoprolol succinate (TOPROL-XL) 25 MG 24 hr tablet Take 1 tablet (25 mg total) by mouth once daily.            While in the hospital, will manage blood pressure as follows; Continue home antihypertensive regimen    Will utilize p.r.n. blood pressure medication only if patient's blood pressure greater than 180/110 and she develops symptoms such as worsening chest pain or shortness of breath.

## 2024-03-18 NOTE — PROGRESS NOTES
[facility]/Surg  [place] [facility] Medicine  Progress Note    Patient Name: Vickie Rahman  MRN: [id number]  Patient Class: IP- Inpatient   Admission Date: 3/14/2024  Length of Stay: 3 days  Attending Physician: Simona Blanton MD  Primary Care Provider: Mau Dominique III, MD        Subjective:     Principal Problem:<principal problem not specified>        HPI:  80 yo F with h/o prior left hip fx June 2023, fell after getting tangled up with her large standard poodle pet and tripped landing on her left side and having severe acute onset pain in left hip.  She was seen in ER and found to have 2 pubic rami fractures.  Hip is intact.  She is c/o pain.  PMHX HTN and DM.    Overview/Hospital Course:  03/15/2024 pt admitted with pubic rami fracture, worked with PT, c/o pain and weak difficulty walking due to pain, weight bearing as tolerated.    03/16/2024 patient in for non operable pelvic fractures.  She continues to have some pain physical therapy is working with her and we are planning on sending her to [place] [facility].  Repeat some labs tomorrow.  03/17/2024 patient was admitted with non operable pelvic fractures.  She is tolerating physical therapy.  Plan is for her to go to rehab tomorrow.  Continue the physical therapy repeat some labs tomorrow.  03/18/2024 elderly patient admitted with non operable pelvic fractures.  Awaiting rehab placement.   has submitted that to insurance awaiting on their pool.  She is in good spirits everything looks fine.  Tolerating physical therapy.    Past Medical History:   Diagnosis Date    Coronary artery disease     Diabetes mellitus     HTN (hypertension) 03/16/2024    Hypercholesteremia     Hypercholesterolemia 03/16/2024    Hypertension     Multiple pelvic fractures 03/16/2024    Type 2 diabetes mellitus 03/16/2024       Past Surgical History:   Procedure Laterality Date    BLADDER SURGERY      cardiac stents      EXCISIONAL HEMORRHOIDECTOMY       INTRAMEDULLARY RODDING OF FEMUR Left 6/5/2023    Procedure: INSERTION, INTRAMEDULLARY JAMAL, FEMUR;  Surgeon: Ace Del Cid DO;  Location: [facility];  Service: Orthopedics;  Laterality: Left;  supine hana table synthes c arm    SHOULDER SURGERY         Review of patient's allergies indicates:  No Known Allergies    No current facility-administered medications on file prior to encounter.     Current Outpatient Medications on File Prior to Encounter   Medication Sig    amitriptyline (ELAVIL) 25 MG tablet Take 1 tablet (25 mg total) by mouth every evening.    aspirin (ECOTRIN) 81 MG EC tablet Take 81 mg by mouth nightly.    glimepiride (AMARYL) 2 MG tablet Take 2 mg by mouth 2 (two) times daily.    isosorbide mononitrate (IMDUR) 30 MG 24 hr tablet Take 1 tablet (30 mg total) by mouth once daily.    losartan-hydrochlorothiazide 100-12.5 mg (HYZAAR) 100-12.5 mg Tab Take 1 tablet by mouth.    metFORMIN (GLUCOPHAGE-XR) 500 MG ER 24hr tablet 1,000 mg 2 (two) times a day.    metoprolol succinate (TOPROL-XL) 25 MG 24 hr tablet Take 1 tablet (25 mg total) by mouth once daily. (Patient taking differently: Take 50 mg by mouth nightly.)    pioglitazone (ACTOS) 15 MG tablet Take 15 mg by mouth nightly.    pravastatin (PRAVACHOL) 40 MG tablet Take 1 tablet (40 mg total) by mouth once daily. (Patient taking differently: Take 40 mg by mouth every evening.)    SYNTHROID 88 mcg tablet Take 88 mcg by mouth every morning.     Family History    Family history is unknown by patient.       Tobacco Use    Smoking status: Never    Smokeless tobacco: Never   Substance and Sexual Activity    Alcohol use: Not Currently    Drug use: Not Currently    Sexual activity: Not on file     Review of Systems   Constitutional:  Negative for activity change, appetite change and fever.   Respiratory:  Negative for chest tightness, shortness of breath and wheezing.    Cardiovascular:  Negative for chest pain.   Gastrointestinal:  Negative for abdominal pain,  constipation, diarrhea, nausea and vomiting.   Genitourinary:  Negative for dysuria.   Musculoskeletal:  Positive for arthralgias, gait problem, joint swelling and myalgias.   Skin:  Negative for rash and wound.   Neurological:  Positive for weakness. Negative for tremors and headaches.     Objective:     Vital Signs (Most Recent):  Temp: 97.4 °F (36.3 °C) (03/18/24 1501)  Pulse: 90 (03/18/24 1501)  Resp: 20 (03/18/24 1501)  BP: (!) 109/58 (03/18/24 1501)  SpO2: (!) 92 % (03/18/24 1501) Vital Signs (24h Range):  Temp:  [97.1 °F (36.2 °C)-98.7 °F (37.1 °C)] 97.4 °F (36.3 °C)  Pulse:  [] 90  Resp:  [18-20] 20  SpO2:  [92 %-97 %] 92 %  BP: ()/(39-89) 109/58     Weight: 55.7 kg (122 lb 12.7 oz)  Body mass index is 20.43 kg/m².     Physical Exam  Constitutional:       General: She is not in acute distress.     Appearance: Normal appearance. She is normal weight. She is not ill-appearing.   HENT:      Head: Normocephalic and atraumatic.   Eyes:      General: No scleral icterus.     Extraocular Movements: Extraocular movements intact.   Cardiovascular:      Rate and Rhythm: Normal rate and regular rhythm.      Pulses: Normal pulses.      Heart sounds: Normal heart sounds.   Pulmonary:      Effort: Pulmonary effort is normal.      Breath sounds: Normal breath sounds.   Abdominal:      General: Abdomen is flat. Bowel sounds are normal.      Palpations: Abdomen is soft.   Musculoskeletal:         General: Tenderness present.      Right lower leg: No edema.      Left lower leg: No edema.      Comments: Left hip bruising and painful to touch hip and toward medially of pubic area   Skin:     General: Skin is warm and dry.      Capillary Refill: Capillary refill takes less than 2 seconds.      Findings: No erythema, lesion or rash.   Neurological:      General: No focal deficit present.      Mental Status: She is alert and oriented to person, place, and time.   Psychiatric:         Mood and Affect: Mood normal.          Behavior: Behavior normal.         Thought Content: Thought content normal.                Significant Labs: All pertinent labs within the past 24 hours have been reviewed.  CBC:   Recent Labs   Lab 03/17/24  0423 03/18/24  0512   WBC 7.43 8.87   HGB 13.3 13.7   HCT 38.2 39.5    283       CMP:   Recent Labs   Lab 03/17/24  0423 03/18/24  0512   * 132*   K 3.6 3.8   CO2 25 24   BUN 17.0 17.0   CREATININE 0.97 0.87   CALCIUM 8.6 8.6   ALBUMIN 3.4 3.4   BILITOT 0.6 0.8   ALKPHOS 58 68   AST 16 20   ALT 14 17         Significant Imaging: I have reviewed all pertinent imaging results/findings within the past 24 hours.    Assessment/Plan:      Type 2 diabetes mellitus  Patient's FSGs are controlled on current medication regimen.  Last A1c reviewed-   Lab Results   Component Value Date    HGBA1C 6.5 (H) 06/09/2023     Most recent fingerstick glucose reviewed-   Recent Labs   Lab 03/17/24  1605 03/17/24  2106 03/18/24  0750 03/18/24  1054   POCTGLUCOSE 182* 173* 178* 273*       Current correctional scale  High  Maintain anti-hyperglycemic dose as follows-   Antihyperglycemics (From admission, onward)      Start     Stop Route Frequency Ordered    03/18/24 1715  metFORMIN ER 24hr tablet 500 mg         -- Oral 2 times daily with meals 03/18/24 1402    03/14/24 2138  insulin aspart U-100 pen 0-5 Units         -- SubQ Before meals & nightly PRN 03/14/24 2038          Hold Oral hypoglycemics while patient is in the hospital.    Hypercholesterolemia  Low-cholesterol diet.  Continue current medications.      HTN (hypertension)  Chronic, controlled. Latest blood pressure and vitals reviewed-     Temp:  [97.1 °F (36.2 °C)-98.7 °F (37.1 °C)]   Pulse:  []   Resp:  [18-20]   BP: ()/(39-89)   SpO2:  [92 %-97 %] .   Home meds for hypertension were reviewed and noted below.   Hypertension Medications               isosorbide mononitrate (IMDUR) 30 MG 24 hr tablet Take 1 tablet (30 mg total) by mouth once daily.     losartan-hydrochlorothiazide 100-12.5 mg (HYZAAR) 100-12.5 mg Tab Take 1 tablet by mouth.    metoprolol succinate (TOPROL-XL) 25 MG 24 hr tablet Take 1 tablet (25 mg total) by mouth once daily.            While in the hospital, will manage blood pressure as follows; Continue home antihypertensive regimen    Will utilize p.r.n. blood pressure medication only if patient's blood pressure greater than 180/110 and she develops symptoms such as worsening chest pain or shortness of breath.    Multiple pelvic fractures  Physical therapy will work with her.  Partial weight-bearing.  Consult [facility].      Other fracture of left femur, initial encounter for closed fracture  Pubic rami fractures  Admit for pain control, will consult PT.  Consult for rehab placement        VTE Risk Mitigation (From admission, onward)           Ordered     IP VTE HIGH RISK PATIENT  Once         03/14/24 1505     Place sequential compression device  Until discontinued         03/14/24 1505                    Discharge Planning   JACKIE: 3/20/2024     Code Status: Full Code   Is the patient medically ready for discharge?:     Reason for patient still in hospital (select all that apply): Patient unstable  Discharge Plan A: Rehab                  Yann Flores MD  Department of Hospital Medicine   [facility]/Surg

## 2024-03-18 NOTE — PLAN OF CARE
03/18/24 1628   Discharge Reassessment   Assessment Type Discharge Planning Reassessment   Did the patient's condition or plan change since previous assessment? No   Discharge Plan discussed with: Patient   Communicated JACKIE with patient/caregiver Yes   Discharge Plan A Rehab   DME Needed Upon Discharge  none   Transition of Care Barriers None   Why the patient remains in the hospital Insurance issues;Placement issues   Post-Acute Status   Post-Acute Authorization Placement   Post-Acute Placement Status Pending payor review/awaiting authorization (if required)   Coverage [facility] Dual Complete   Discharge Delays (!) Post-Acute Set-up

## 2024-03-18 NOTE — PT/OT/SLP PROGRESS
Physical Therapy Treatment    Patient Name:  Mandy Rahman   MRN:  [id number]    Recommendations:     Discharge Recommendations: High Intensity Therapy  Discharge Equipment Recommendations: none  Barriers to discharge:  current medical status    Assessment:     Mandy Rahman is a 79 y.o. female admitted with a medical diagnosis of <principal problem not specified>.  She presents with the following impairments/functional limitations: weakness, impaired endurance, impaired functional mobility, gait instability, impaired balance, decreased lower extremity function, decreased safety awareness, pain     Patient found with HOB elevated. Patient agreeable to therapy today. Patient tolerated supine to sit with min A. Then tolerated sit to stand with min A. Patient tolerated 60 feet of gait training with RW with offweighting of left leg slightly due to pain. While patient was returning to chair, patient became very dizzy. Patient able make it back to chair and sat. BP taken - 80s/50s. Patient given water and educated on deep breathing. Patient's BP remained low when taken again, still 80s/50s. Nurse, Adriana, notified. Therapist and nurse got patient back to bed, while standing, BP taken again and still remained low. Upon returning to supine, BP mandy to 119/58. Patient left in supine with HOB slightly elevated.    Rehab Prognosis: Good; patient would benefit from acute skilled PT services to address these deficits and reach maximum level of function.    Recent Surgery: * No surgery found *      Plan:     During this hospitalization, patient to be seen 5 x/week (5-6x weekly/1-2x daily) to address the identified rehab impairments via therapeutic activities, gait training, therapeutic exercises and progress toward the following goals:    Plan of Care Expires:  04/15/24    Subjective     Chief Complaint: weakness, pain  Patient/Family Comments/goals: to get stronger  Pain/Comfort:         Objective:     Communicated with nursing prior  to session.  Patient found HOB elevated with peripheral IV, day catheter upon PT entry to room.     General Precautions: Standard, fall  Orthopedic Precautions: LLE weight bearing as tolerated  Braces: N/A  Respiratory Status: Room air     Functional Mobility:  Bed Mobility:     Supine to Sit: minimum assistance  Sit to Supine: minimum assistance  Transfers:     Sit to Stand:  minimum assistance with rolling walker  Gait: 60 feet with min A       AM-PAC 6 CLICK MOBILITY          Treatment & Education:  See above    Patient left HOB elevated with all lines intact and call button in reach..    GOALS:   Multidisciplinary Problems       Physical Therapy Goals          Problem: Physical Therapy    Goal Priority Disciplines Outcome Goal Variances Interventions   Physical Therapy Goal     PT, PT/OT Ongoing, Progressing     Description: Goals to be met by: discharge     Patient will increase functional independence with mobility by performin. Supine to sit with Stand-by Assistance  2. Sit to stand transfer with Stand-by Assistance  3. Gait  x 75 feet with Stand-by Assistance using Rolling Walker.                          Time Tracking:     PT Received On: 24  PT Start Time: 930     PT Stop Time: 55  PT Total Time (min): 25 min     Billable Minutes: Gait Training 15 and Therapeutic Activity 10    Treatment Type: Treatment  PT/PTA: PT           2024

## 2024-03-18 NOTE — PLAN OF CARE
[facility] contacted to check status of approval, spoke with Billie who stated they are still awaiting insurance ( [employer] ) authorization to admit patient.

## 2024-03-18 NOTE — ASSESSMENT & PLAN NOTE
Patient's FSGs are controlled on current medication regimen.  Last A1c reviewed-   Lab Results   Component Value Date    HGBA1C 6.5 (H) 06/09/2023     Most recent fingerstick glucose reviewed-   Recent Labs   Lab 03/17/24  1605 03/17/24  2106 03/18/24  0750 03/18/24  1054   POCTGLUCOSE 182* 173* 178* 273*       Current correctional scale  High  Maintain anti-hyperglycemic dose as follows-   Antihyperglycemics (From admission, onward)    Start     Stop Route Frequency Ordered    03/18/24 1715  metFORMIN ER 24hr tablet 500 mg         -- Oral 2 times daily with meals 03/18/24 1402    03/14/24 2138  insulin aspart U-100 pen 0-5 Units         -- SubQ Before meals & nightly PRN 03/14/24 2038        Hold Oral hypoglycemics while patient is in the hospital.

## 2024-03-19 VITALS
OXYGEN SATURATION: 92 % | TEMPERATURE: 97 F | RESPIRATION RATE: 20 BRPM | WEIGHT: 122.81 LBS | HEIGHT: 65 IN | SYSTOLIC BLOOD PRESSURE: 99 MMHG | BODY MASS INDEX: 20.46 KG/M2 | HEART RATE: 83 BPM | DIASTOLIC BLOOD PRESSURE: 52 MMHG

## 2024-03-19 LAB
ALBUMIN SERPL-MCNC: 3.2 G/DL (ref 3.4–5)
ALBUMIN/GLOB SERPL: 1.3 RATIO
ALP SERPL-CCNC: 72 UNIT/L (ref 50–144)
ALT SERPL-CCNC: 22 UNIT/L (ref 1–45)
ANION GAP SERPL CALC-SCNC: 6 MEQ/L (ref 2–13)
AST SERPL-CCNC: 21 UNIT/L (ref 14–36)
BASOPHILS # BLD AUTO: 0.04 X10(3)/MCL (ref 0.01–0.08)
BASOPHILS NFR BLD AUTO: 0.5 % (ref 0.1–1.2)
BILIRUB SERPL-MCNC: 0.7 MG/DL (ref 0–1)
BUN SERPL-MCNC: 21 MG/DL (ref 7–20)
CALCIUM SERPL-MCNC: 8.4 MG/DL (ref 8.4–10.2)
CHLORIDE SERPL-SCNC: 101 MMOL/L (ref 98–110)
CO2 SERPL-SCNC: 24 MMOL/L (ref 21–32)
CREAT SERPL-MCNC: 0.94 MG/DL (ref 0.66–1.25)
CREAT/UREA NIT SERPL: 22 (ref 12–20)
EOSINOPHIL # BLD AUTO: 0.3 X10(3)/MCL (ref 0.04–0.36)
EOSINOPHIL NFR BLD AUTO: 3.8 % (ref 0.7–7)
ERYTHROCYTE [DISTWIDTH] IN BLOOD BY AUTOMATED COUNT: 13.1 % (ref 11–14.5)
GFR SERPLBLD CREATININE-BSD FMLA CKD-EPI: 62 MLS/MIN/1.73/M2
GLOBULIN SER-MCNC: 2.4 GM/DL (ref 2–3.9)
GLUCOSE SERPL-MCNC: 206 MG/DL (ref 70–115)
HCT VFR BLD AUTO: 36.6 % (ref 36–48)
HGB BLD-MCNC: 12.6 G/DL (ref 11.8–16)
IMM GRANULOCYTES # BLD AUTO: 0.04 X10(3)/MCL (ref 0–0.03)
IMM GRANULOCYTES NFR BLD AUTO: 0.5 % (ref 0–0.5)
LYMPHOCYTES # BLD AUTO: 2.74 X10(3)/MCL (ref 1.16–3.74)
LYMPHOCYTES NFR BLD AUTO: 34.4 % (ref 20–55)
MAGNESIUM SERPL-MCNC: 2 MG/DL (ref 1.8–2.4)
MCH RBC QN AUTO: 30.4 PG (ref 27–34)
MCHC RBC AUTO-ENTMCNC: 34.4 G/DL (ref 31–37)
MCV RBC AUTO: 88.4 FL (ref 79–99)
MONOCYTES # BLD AUTO: 0.72 X10(3)/MCL (ref 0.24–0.36)
MONOCYTES NFR BLD AUTO: 9 % (ref 4.7–12.5)
NEUTROPHILS # BLD AUTO: 4.12 X10(3)/MCL (ref 1.56–6.13)
NEUTROPHILS NFR BLD AUTO: 51.8 % (ref 37–73)
NRBC BLD AUTO-RTO: 0 %
PLATELET # BLD AUTO: 284 X10(3)/MCL (ref 140–371)
PMV BLD AUTO: 8.5 FL (ref 9.4–12.4)
POCT GLUCOSE: 191 MG/DL (ref 70–110)
POCT GLUCOSE: 222 MG/DL (ref 70–110)
POTASSIUM SERPL-SCNC: 4.1 MMOL/L (ref 3.5–5.1)
PROT SERPL-MCNC: 5.6 GM/DL (ref 6.3–8.2)
RBC # BLD AUTO: 4.14 X10(6)/MCL (ref 4–5.1)
SODIUM SERPL-SCNC: 131 MMOL/L (ref 135–145)
WBC # SPEC AUTO: 7.96 X10(3)/MCL (ref 4–11.5)

## 2024-03-19 PROCEDURE — 94761 N-INVAS EAR/PLS OXIMETRY MLT: CPT

## 2024-03-19 PROCEDURE — 25000003 PHARM REV CODE 250: Performed by: INTERNAL MEDICINE

## 2024-03-19 PROCEDURE — 97116 GAIT TRAINING THERAPY: CPT

## 2024-03-19 PROCEDURE — 83735 ASSAY OF MAGNESIUM: CPT | Performed by: INTERNAL MEDICINE

## 2024-03-19 PROCEDURE — 80053 COMPREHEN METABOLIC PANEL: CPT | Performed by: INTERNAL MEDICINE

## 2024-03-19 PROCEDURE — 85025 COMPLETE CBC W/AUTO DIFF WBC: CPT | Performed by: INTERNAL MEDICINE

## 2024-03-19 PROCEDURE — 36415 COLL VENOUS BLD VENIPUNCTURE: CPT | Performed by: INTERNAL MEDICINE

## 2024-03-19 PROCEDURE — 97110 THERAPEUTIC EXERCISES: CPT

## 2024-03-19 PROCEDURE — 25000003 PHARM REV CODE 250: Performed by: FAMILY MEDICINE

## 2024-03-19 RX ORDER — HYDROCODONE BITARTRATE AND ACETAMINOPHEN 5; 325 MG/1; MG/1
1 TABLET ORAL EVERY 6 HOURS PRN
Qty: 15 TABLET | Refills: 0 | Status: SHIPPED | OUTPATIENT
Start: 2024-03-19

## 2024-03-19 RX ORDER — METFORMIN HYDROCHLORIDE 500 MG/1
1000 TABLET, EXTENDED RELEASE ORAL 2 TIMES DAILY WITH MEALS
Status: DISCONTINUED | OUTPATIENT
Start: 2024-03-19 | End: 2024-03-19 | Stop reason: HOSPADM

## 2024-03-19 RX ADMIN — METFORMIN HYDROCHLORIDE 1000 MG: 500 TABLET, EXTENDED RELEASE ORAL at 08:03

## 2024-03-19 RX ADMIN — HYDROCHLOROTHIAZIDE 12.5 MG: 12.5 TABLET ORAL at 08:03

## 2024-03-19 RX ADMIN — ISOSORBIDE MONONITRATE 30 MG: 30 TABLET, EXTENDED RELEASE ORAL at 08:03

## 2024-03-19 RX ADMIN — LEVOTHYROXINE SODIUM 88 MCG: 88 TABLET ORAL at 06:03

## 2024-03-19 RX ADMIN — LOSARTAN POTASSIUM 100 MG: 50 TABLET, FILM COATED ORAL at 08:03

## 2024-03-19 RX ADMIN — MUPIROCIN: 20 OINTMENT TOPICAL at 08:03

## 2024-03-19 RX ADMIN — INSULIN ASPART 2 UNITS: 100 INJECTION, SOLUTION INTRAVENOUS; SUBCUTANEOUS at 11:03

## 2024-03-19 NOTE — PLAN OF CARE
Physician ordered to discharge patient home with home health and physical therapy. Referral made to [facility] per phone/fax, spoke with Dayana. [facility] nurse to admit patient tomorrow.

## 2024-03-19 NOTE — PLAN OF CARE
Problem: Physical Therapy  Goal: Physical Therapy Goal  Description: Goals to be met by: discharge     Patient will increase functional independence with mobility by performin. Supine to sit with Stand-by Assistance  2. Sit to stand transfer with Stand-by Assistance  3. Gait  x 75 feet with Stand-by Assistance using Rolling Walker.     Outcome: Adequate for Care Transition

## 2024-03-19 NOTE — PLAN OF CARE
Patient and daughter at bedside notified of [facility] denial by her insurance.  Both verbalized understanding.  We discussed the option of SNF placement.  Daughter states they would rather bring patient home with HHC/PT and patient is in agreement.  We will discuss discharge plan with  on rounds.

## 2024-03-19 NOTE — PT/OT/SLP DISCHARGE
Physical Therapy Discharge Summary    Name: Vickie Rahman  MRN: [id number]   Principal Problem: Multiple pelvic fractures     Patient Discharged from acute Physical Therapy on 3/19/24.  Please refer to prior PT noted date on 3/19/24 for functional status.     Assessment:     Goals partially met. Patient appropriate for care in another setting.    Objective:     GOALS:   Multidisciplinary Problems       Physical Therapy Goals          Problem: Physical Therapy    Goal Priority Disciplines Outcome Goal Variances Interventions   Physical Therapy Goal     PT, PT/OT Adequate for Care Transition     Description: Goals to be met by: discharge     Patient will increase functional independence with mobility by performin. Supine to sit with Stand-by Assistance  2. Sit to stand transfer with Stand-by Assistance  3. Gait  x 75 feet with Stand-by Assistance using Rolling Walker.                          Reasons for Discontinuation of Therapy Services  Transfer to alternate level of care.      Plan:     Patient Discharged to: Home with Home Health Service.      3/19/2024

## 2024-03-19 NOTE — PLAN OF CARE
03/19/24 1142   Final Note   Assessment Type Final Discharge Note   Anticipated Discharge Disposition Home-Health  ([facility])   What phone number can be called within the next 1-3 days to see how you are doing after discharge? [phone]   Hospital Resources/Appts/Education Provided Post-Acute resouces added to AVS   Post-Acute Status   Post-Acute Authorization Home Health   Post-Acute Placement Status Discharge Plan Changed   Home Health Status Set-up Complete/Auth obtained   Coverage [facility] Managed [facility] Dual Complete   Patient choice form signed by patient/caregiver List with quality metrics by geographic area provided;List from CMS Compare;List from System Post-Acute Care   Discharge Delays None known at this time

## 2024-03-19 NOTE — DISCHARGE SUMMARY
[facility]/Surg  Hospital Medicine  Discharge Summary      Patient Name: Vickie Rahman  MRN: [id number]  HAIDER: [id number]  Patient Class: IP- Inpatient  Admission Date: 3/14/2024  Hospital Length of Stay: 4 days  Discharge Date and Time:  03/19/2024 12:03 PM  Attending Physician: Simona Blanton MD   Discharging Provider: Yann Flores MD  Primary Care Provider: Mau Dominique III, MD    Primary Care Team: Networked reference to record PCT     HPI:   78 yo F with h/o prior left hip fx June 2023, fell after getting tangled up with her large standard poodle pet and tripped landing on her left side and having severe acute onset pain in left hip.  She was seen in ER and found to have 2 pubic rami fractures.  Hip is intact.  She is c/o pain.  PMHX HTN and DM.    * No surgery found *      Hospital Course:   03/15/2024 pt admitted with pubic rami fracture, worked with PT, c/o pain and weak difficulty walking due to pain, weight bearing as tolerated.    03/16/2024 patient in for non operable pelvic fractures.  She continues to have some pain physical therapy is working with her and we are planning on sending her to [place] [facility].  Repeat some labs tomorrow.  03/17/2024 patient was admitted with non operable pelvic fractures.  She is tolerating physical therapy.  Plan is for her to go to rehab tomorrow.  Continue the physical therapy repeat some labs tomorrow.  03/18/2024 elderly patient admitted with non operable pelvic fractures.  Awaiting rehab placement.   has submitted that to insurance awaiting on their pool.  She is in good spirits everything looks fine.  Tolerating physical therapy.  03/19/2024 brief discharge summary 79-year-old female patient of Dr. Derian Dominique presented after fall found to have multiple non operable pelvic fractures.  Hospital course patient was admitted to the hospital we consulted physical therapy and gave her pain medication.  We recommended she go to  [facility] but her insurance denied it.  Patient has opted to go home with home health and home PT.  She has not had any problems doing well tolerating diet and tolerating physical therapy.  We will send her home with 15 doses of Norco 5 mg.  We will send her home with her usual home medications.  She will follow-up with the primary care provider in 1 week.     Goals of Care Treatment Preferences:  Code Status: Full Code      Consults:   Consults (From admission, onward)          Status Ordering Provider     Inpatient consult to Social Work/Case Management  Once        Provider:  (Not yet assigned)    Acknowledged PREM LATHAM            No new Assessment & Plan notes have been filed under this hospital service since the last note was generated.  Service: Hospital Medicine    Final Active Diagnoses:    Diagnosis Date Noted POA    PRINCIPAL PROBLEM:  Multiple pelvic fractures [S32.82XA] 03/16/2024 Unknown    HTN (hypertension) [I10] 03/16/2024 Unknown    Hypercholesterolemia [E78.00] 03/16/2024 Unknown    Type 2 diabetes mellitus [E11.9] 03/16/2024 Unknown    Other fracture of left femur, initial encounter for closed fracture [S72.8X2A] 06/05/2023 Yes      Problems Resolved During this Admission:       Discharged Condition: good    Disposition: Home-Health Care [facility]    Follow Up:   Follow-up Information       [facility] Follow up.    Specialties: Home Health Services, Home Therapy Services, Home Living Aide Services  Contact information:  [address] [place] [address], [facility]  [facility] [address]  [phone]             Mau Dominique III, MD Follow up in 1 week(s).    Specialty: Family Medicine  Contact information:  [address] [place] [address]  [phone]                           Patient Instructions:   No discharge procedures on file.    Significant Diagnostic Studies:     Pending Diagnostic Studies:       None           Medications:  Reconciled Home Medications:      Medication  List        START taking these medications      HYDROcodone-acetaminophen 5-325 mg per tablet  Commonly known as: NORCO  Take 1 tablet by mouth every 6 (six) hours as needed for Pain.            CHANGE how you take these medications      metoprolol succinate 25 MG 24 hr tablet  Commonly known as: TOPROL-XL  Take 1 tablet (25 mg total) by mouth once daily.  What changed:   how much to take  when to take this     pravastatin 40 MG tablet  Commonly known as: PRAVACHOL  Take 1 tablet (40 mg total) by mouth once daily.  What changed: when to take this            CONTINUE taking these medications      amitriptyline 25 MG tablet  Commonly known as: ELAVIL  Take 1 tablet (25 mg total) by mouth every evening.     aspirin 81 MG EC tablet  Commonly known as: ECOTRIN  Take 81 mg by mouth nightly.     glimepiride 2 MG tablet  Commonly known as: AMARYL  Take 2 mg by mouth 2 (two) times daily.     isosorbide mononitrate 30 MG 24 hr tablet  Commonly known as: IMDUR  Take 1 tablet (30 mg total) by mouth once daily.     losartan-hydrochlorothiazide 100-12.5 mg 100-12.5 mg Tab  Commonly known as: HYZAAR  Take 1 tablet by mouth.     metFORMIN 500 MG ER 24hr tablet  Commonly known as: GLUCOPHAGE-XR  1,000 mg 2 (two) times a day.     pioglitazone 15 MG tablet  Commonly known as: ACTOS  Take 15 mg by mouth nightly.     SYNTHROID 88 MCG tablet  Generic drug: levothyroxine  Take 88 mcg by mouth every morning.              Indwelling Lines/Drains at time of discharge:   Lines/Drains/Airways       None                   Time spent on the discharge of patient: 36 minutes         Yann Flores MD  Department of Hospital Medicine  [facility]

## 2024-03-19 NOTE — PLAN OF CARE
discussed discharge plan with patient.  Discharge plan to return home with HHC/PT.  Patient chose [facility] and signed CCP.

## 2024-03-19 NOTE — PT/OT/SLP PROGRESS
Physical Therapy Treatment    Patient Name:  Vickie Rahman   MRN:  [id number]    Recommendations:     Discharge Recommendations: High Intensity Therapy  Discharge Equipment Recommendations: none  Barriers to discharge:  current medical status    Assessment:     Vickie Rahman is a 79 y.o. female admitted with a medical diagnosis of <principal problem not specified>.  She presents with the following impairments/functional limitations: weakness, impaired endurance, impaired functional mobility, gait instability, impaired balance, decreased lower extremity function, decreased safety awareness, pain     Patient found with HOB elevated. Patient agreeable to therapy today. Patient tolerated supine to sit with min A, then tolerated sit to stand with min A/CGA. Patient tolerated 25 feet of gait training with RW with offweighting of left leg slightly due to pain. Patient educated on keeping foot flat and proper heel strike and toe-off. Patient returned to sit up in recliner. While sitting, patient performed BLE therex.     Rehab Prognosis: Good; patient would benefit from acute skilled PT services to address these deficits and reach maximum level of function.    Recent Surgery: * No surgery found *      Plan:     During this hospitalization, patient to be seen 5 x/week (5-6x weekly/1-2x daily) to address the identified rehab impairments via therapeutic activities, gait training, therapeutic exercises and progress toward the following goals:    Plan of Care Expires:  04/15/24    Subjective     Chief Complaint: weakness, pain  Patient/Family Comments/goals: to get stronger  Pain/Comfort:         Objective:     Communicated with nursing prior to session.  Patient found HOB elevated with peripheral IV, day catheter upon PT entry to room.     General Precautions: Standard, fall  Orthopedic Precautions: LLE weight bearing as tolerated  Braces: N/A  Respiratory Status: Room air     Functional Mobility:  Bed Mobility:     Supine to  Sit: minimum assistance  Transfers:     Sit to Stand:  contact guard assistance and minimum assistance with rolling walker  Gait: 25 feet with min A       AM-PAC 6 CLICK MOBILITY          Treatment & Education:  See above    Patient left up in chair with all lines intact and call button in reach..    GOALS:   Multidisciplinary Problems       Physical Therapy Goals          Problem: Physical Therapy    Goal Priority Disciplines Outcome Goal Variances Interventions   Physical Therapy Goal     PT, PT/OT Ongoing, Progressing     Description: Goals to be met by: discharge     Patient will increase functional independence with mobility by performin. Supine to sit with Stand-by Assistance  2. Sit to stand transfer with Stand-by Assistance  3. Gait  x 75 feet with Stand-by Assistance using Rolling Walker.                          Time Tracking:     PT Received On: 24  PT Start Time: 0850     PT Stop Time: 0915  PT Total Time (min): 25 min     Billable Minutes: Gait Training 15 and Therapeutic Exercise 10    Treatment Type: Treatment  PT/PTA: PT           2024

## 2024-03-19 NOTE — PLAN OF CARE
Rec'd call from Billie of [facility] stating the insurance is requesting a P2P by noon today. The number to call is [phone] option 5, nursing informed and will notify MD.

## 2024-03-19 NOTE — DISCHARGE INSTRUCTIONS
-Take medications as prescribed  -Keep follow-up appointment.  -Limit activity as tolerated. Change positions slowly.   -Inform your PCP if you experience:   Signs of infection. These include a fever of 100.4°F (38°C) or higher, chills, and wound that will not heal.  Signs of wound infection. These include swelling, redness, warmth around the wound; too much pain when touched; yellowish, greenish, or bloody discharge; foul smell coming from the cut site; cut site opens up.  Pain, numbness, tingling, swelling, or decreased sensation  Pain unrelieved by medications   Unable to tolerate basic activities of daily living.  -Diet- As tolerated

## 2024-03-20 ENCOUNTER — PATIENT MESSAGE (OUTPATIENT)
Dept: ADMINISTRATIVE | Facility: CLINIC | Age: 79
End: 2024-03-20
Payer: MEDICARE

## 2024-03-20 ENCOUNTER — PATIENT OUTREACH (OUTPATIENT)
Dept: ADMINISTRATIVE | Facility: CLINIC | Age: 79
End: 2024-03-20
Payer: MEDICARE

## 2024-03-20 NOTE — PROGRESS NOTES
C3 nurse attempted to contact Vickie Rahman for a TCC post hospital discharge follow up call. No answer. No voicemail available. The patient has a scheduled HOSFU appointment with Mau Dominique III, MD on 3/26/24 @ 9:15.

## 2024-03-21 NOTE — PROGRESS NOTES
C3 nurse spoke with Vickie Rahman for a TCC post hospital discharge follow up call. The patient has a scheduled HOSFU appointment with Mau Dominique III, MD on 3/26/24 @ 9:15.

## 2024-05-21 ENCOUNTER — OFFICE VISIT (OUTPATIENT)
Dept: ORTHOPEDICS | Facility: CLINIC | Age: 79
End: 2024-05-21
Payer: MEDICARE

## 2024-05-21 ENCOUNTER — HOSPITAL ENCOUNTER (OUTPATIENT)
Dept: RADIOLOGY | Facility: CLINIC | Age: 79
Discharge: HOME | End: 2024-05-21
Attending: ORTHOPAEDIC SURGERY
Payer: MEDICARE

## 2024-05-21 VITALS
DIASTOLIC BLOOD PRESSURE: 74 MMHG | BODY MASS INDEX: 20.46 KG/M2 | WEIGHT: 122.81 LBS | SYSTOLIC BLOOD PRESSURE: 181 MMHG | HEIGHT: 65 IN | HEART RATE: 73 BPM

## 2024-05-21 DIAGNOSIS — S72.8X2D OTHER CLOSED FRACTURE OF LEFT FEMUR WITH ROUTINE HEALING, UNSPECIFIED PORTION OF FEMUR, SUBSEQUENT ENCOUNTER: ICD-10-CM

## 2024-05-21 DIAGNOSIS — S72.8X2D OTHER CLOSED FRACTURE OF LEFT FEMUR WITH ROUTINE HEALING, UNSPECIFIED PORTION OF FEMUR, SUBSEQUENT ENCOUNTER: Primary | ICD-10-CM

## 2024-05-21 PROCEDURE — 73552 X-RAY EXAM OF FEMUR 2/>: CPT | Mod: LT,,, | Performed by: ORTHOPAEDIC SURGERY

## 2024-05-21 PROCEDURE — 1159F MED LIST DOCD IN RCRD: CPT | Mod: CPTII,,, | Performed by: PHYSICIAN ASSISTANT

## 2024-05-21 PROCEDURE — 1101F PT FALLS ASSESS-DOCD LE1/YR: CPT | Mod: CPTII,,, | Performed by: PHYSICIAN ASSISTANT

## 2024-05-21 PROCEDURE — 99213 OFFICE O/P EST LOW 20 MIN: CPT | Mod: ,,, | Performed by: PHYSICIAN ASSISTANT

## 2024-05-21 PROCEDURE — 3077F SYST BP >= 140 MM HG: CPT | Mod: CPTII,,, | Performed by: PHYSICIAN ASSISTANT

## 2024-05-21 PROCEDURE — 3078F DIAST BP <80 MM HG: CPT | Mod: CPTII,,, | Performed by: PHYSICIAN ASSISTANT

## 2024-05-21 PROCEDURE — 3288F FALL RISK ASSESSMENT DOCD: CPT | Mod: CPTII,,, | Performed by: PHYSICIAN ASSISTANT

## 2024-05-21 NOTE — PROGRESS NOTES
Subjective:       Patient ID: Vickie Rahman is a 79 y.o. female.  Chief Complaint   Patient presents with    Follow-up     11.5 mos, IMN left IT femur FX sx 6/5/23.lt leg pain, had a fall back in march when dog tripped her, went to [place] ER, pt states pain has decreased some, wbat, ambulating without assistance, has no other complaints at this time,         HPI    Patient presents for 11.5 mo follow up IMN left IT fracture. She presents today due to some concerns in the lateral side of the hip. Feeling a pop when she extends her hip. Otherwise she is doing well with minimal pain. She is ambulatory without assistance today. No numbness or tingling.     ROS:  Constitutional: Denies fever chills  Eyes: No change in vision  ENT: No ringing or current infections  CV: No chest pain  Resp: No labored breathing  MSK: Pain evident at site of injury located in HPI,   Integ: No signs of abrasions or lacerations  Neuro: No numbness or tingling  Lymphatic: No swelling outside the area of injury     Current Outpatient Medications on File Prior to Visit   Medication Sig Dispense Refill    amitriptyline (ELAVIL) 25 MG tablet Take 1 tablet (25 mg total) by mouth every evening.      aspirin (ECOTRIN) 81 MG EC tablet Take 81 mg by mouth nightly.      glimepiride (AMARYL) 2 MG tablet Take 2 mg by mouth 2 (two) times daily.      HYDROcodone-acetaminophen (NORCO) 5-325 mg per tablet Take 1 tablet by mouth every 6 (six) hours as needed for Pain. 15 tablet 0    isosorbide mononitrate (IMDUR) 30 MG 24 hr tablet Take 1 tablet (30 mg total) by mouth once daily.      losartan-hydrochlorothiazide 100-12.5 mg (HYZAAR) 100-12.5 mg Tab Take 1 tablet by mouth.      metFORMIN (GLUCOPHAGE-XR) 500 MG ER 24hr tablet 1,000 mg 2 (two) times a day.      metoprolol succinate (TOPROL-XL) 25 MG 24 hr tablet Take 1 tablet (25 mg total) by mouth once daily.      pioglitazone (ACTOS) 15 MG tablet Take 15 mg by mouth nightly.      pravastatin (PRAVACHOL) 40 MG  "tablet Take 1 tablet (40 mg total) by mouth once daily. (Patient taking differently: Take 40 mg by mouth every evening.)      SYNTHROID 88 mcg tablet Take 88 mcg by mouth every morning.       No current facility-administered medications on file prior to visit.          Objective:      BP (!) 181/74   Pulse 73   Ht 5' 5" (1.651 m)   Wt 55.7 kg (122 lb 12.7 oz)   LMP  (LMP Unknown)   BMI 20.43 kg/m²   Physical Exam  General the patient is alert and oriented x3 no acute distress nontoxic-appearing appropriate affect.    Constitutional: Vital signs are examined and stable.  Resp: No signs of labored breathing               LLE: -Skin:  No signs of new abrasions or lacerations, no scars           -MSK: Hip and Knee F/E, EHL/FHL, Gastroc/Tib anterior Strength 5/5; palpable slide of IT band over the hardware laterally           -Neuro:  Sensation intact to light touch L3-S1 dermatomes           -Lymphatic: No signs of lymphadenopathy           -CV: Capillary refill is less than 2 seconds. DP/PT pulses 2/4. Compartments soft and compressible                              Body mass index is 20.43 kg/m².  Ideal body weight: 57 kg (125 lb 10.6 oz)  Hemoglobin A1c   Date Value Ref Range Status   06/09/2023 6.5 (H) 4.0 - 6.0 % Final     Hgb   Date Value Ref Range Status   03/19/2024 12.6 11.8 - 16.0 g/dL Final   03/18/2024 13.7 11.8 - 16.0 g/dL Final     Hct   Date Value Ref Range Status   03/19/2024 36.6 36.0 - 48.0 % Final   03/18/2024 39.5 36.0 - 48.0 % Final     No results found for: "IRON"  No components found for: "FROLATE"  No results found for: "XAXDKBMB02HT"  WBC   Date Value Ref Range Status   03/19/2024 7.96 4.00 - 11.50 x10(3)/mcL Final   03/18/2024 8.87 4.00 - 11.50 x10(3)/mcL Final       Radiology: 3 view x ray left femur: hardware intact without loosening or failure. Continued consolidation consistent with full union. Helical blade is not very prominent        Assessment:         1. Other closed fracture of " left femur with routine healing, unspecified portion of femur, subsequent encounter  X-Ray Femur 2 View Left              Plan:         Follow up if symptoms worsen or fail to improve.    Vickie was seen today for follow-up.    Diagnoses and all orders for this visit:    Other closed fracture of left femur with routine healing, unspecified portion of femur, subsequent encounter  -     X-Ray Femur 2 View Left; Future        -continue activity as tolerated without restriction. Believe her IT band is popping over her helical blade as she is very slim and is feeling a palpable click at this area. Overall it causes her minimal pain, relieved with over the counter medications.   - we will see her back as needed should she have any complaints in the future. We would try to avoid hardware removal at all costs. She is not eager to proceed with a surgery at this time.  -ED precautions given    The above findings, diagnostics, and treatment plan were discussed with Dr. Del Cid who is in agreement with the plan of care except as stated in additional documentation.       Che Butterfield PA-C          No future appointments.

## 2024-06-28 ENCOUNTER — HOSPITAL ENCOUNTER (EMERGENCY)
Facility: HOSPITAL | Age: 79
Discharge: HOME | End: 2024-06-28
Attending: STUDENT IN AN ORGANIZED HEALTH CARE EDUCATION/TRAINING PROGRAM
Payer: MEDICARE

## 2024-06-28 VITALS
HEART RATE: 99 BPM | DIASTOLIC BLOOD PRESSURE: 91 MMHG | RESPIRATION RATE: 20 BRPM | BODY MASS INDEX: 20.66 KG/M2 | TEMPERATURE: 99 F | SYSTOLIC BLOOD PRESSURE: 161 MMHG | OXYGEN SATURATION: 97 % | HEIGHT: 65 IN | WEIGHT: 124 LBS

## 2024-06-28 DIAGNOSIS — I95.9 HYPOTENSIVE EPISODE: ICD-10-CM

## 2024-06-28 DIAGNOSIS — R00.0 TACHYCARDIA: ICD-10-CM

## 2024-06-28 DIAGNOSIS — R00.2 PALPITATIONS: Primary | ICD-10-CM

## 2024-06-28 LAB
ALBUMIN SERPL-MCNC: 4.2 G/DL (ref 3.4–4.8)
ALBUMIN/GLOB SERPL: 1.2 RATIO (ref 1.1–2)
ALP SERPL-CCNC: 77 UNIT/L (ref 40–150)
ALT SERPL-CCNC: 9 UNIT/L (ref 0–55)
ANION GAP SERPL CALC-SCNC: 11 MEQ/L
AST SERPL-CCNC: 12 UNIT/L (ref 5–34)
BASOPHILS # BLD AUTO: 0.06 X10(3)/MCL
BASOPHILS NFR BLD AUTO: 0.7 %
BILIRUB SERPL-MCNC: 0.5 MG/DL
BUN SERPL-MCNC: 14 MG/DL (ref 9.8–20.1)
CALCIUM SERPL-MCNC: 10 MG/DL (ref 8.4–10.2)
CHLORIDE SERPL-SCNC: 102 MMOL/L (ref 98–107)
CO2 SERPL-SCNC: 24 MMOL/L (ref 23–31)
CREAT SERPL-MCNC: 1.28 MG/DL (ref 0.55–1.02)
CREAT/UREA NIT SERPL: 11
EOSINOPHIL # BLD AUTO: 0.14 X10(3)/MCL (ref 0–0.9)
EOSINOPHIL NFR BLD AUTO: 1.6 %
ERYTHROCYTE [DISTWIDTH] IN BLOOD BY AUTOMATED COUNT: 13.2 % (ref 11.5–17)
GFR SERPLBLD CREATININE-BSD FMLA CKD-EPI: 43 ML/MIN/1.73/M2
GLOBULIN SER-MCNC: 3.4 GM/DL (ref 2.4–3.5)
GLUCOSE SERPL-MCNC: 196 MG/DL (ref 82–115)
HCT VFR BLD AUTO: 45.4 % (ref 37–47)
HGB BLD-MCNC: 14.8 G/DL (ref 12–16)
IMM GRANULOCYTES # BLD AUTO: 0.02 X10(3)/MCL (ref 0–0.04)
IMM GRANULOCYTES NFR BLD AUTO: 0.2 %
LACTATE SERPL-SCNC: 1.5 MMOL/L (ref 0.5–2.2)
LYMPHOCYTES # BLD AUTO: 2.98 X10(3)/MCL (ref 0.6–4.6)
LYMPHOCYTES NFR BLD AUTO: 34.2 %
MCH RBC QN AUTO: 29.7 PG (ref 27–31)
MCHC RBC AUTO-ENTMCNC: 32.6 G/DL (ref 33–36)
MCV RBC AUTO: 91.2 FL (ref 80–94)
MONOCYTES # BLD AUTO: 1.12 X10(3)/MCL (ref 0.1–1.3)
MONOCYTES NFR BLD AUTO: 12.8 %
NEUTROPHILS # BLD AUTO: 4.4 X10(3)/MCL (ref 2.1–9.2)
NEUTROPHILS NFR BLD AUTO: 50.5 %
PLATELET # BLD AUTO: 291 X10(3)/MCL (ref 130–400)
PMV BLD AUTO: 8.6 FL (ref 7.4–10.4)
POTASSIUM SERPL-SCNC: 3.8 MMOL/L (ref 3.5–5.1)
PROT SERPL-MCNC: 7.6 GM/DL (ref 5.8–7.6)
RBC # BLD AUTO: 4.98 X10(6)/MCL (ref 4.2–5.4)
SODIUM SERPL-SCNC: 137 MMOL/L (ref 136–145)
TROPONIN I SERPL-MCNC: <0.01 NG/ML (ref 0–0.04)
TSH SERPL-ACNC: 1.26 UIU/ML (ref 0.35–4.94)
WBC # BLD AUTO: 8.72 X10(3)/MCL (ref 4.5–11.5)

## 2024-06-28 PROCEDURE — 84443 ASSAY THYROID STIM HORMONE: CPT | Performed by: STUDENT IN AN ORGANIZED HEALTH CARE EDUCATION/TRAINING PROGRAM

## 2024-06-28 PROCEDURE — 83605 ASSAY OF LACTIC ACID: CPT | Performed by: STUDENT IN AN ORGANIZED HEALTH CARE EDUCATION/TRAINING PROGRAM

## 2024-06-28 PROCEDURE — 93005 ELECTROCARDIOGRAM TRACING: CPT

## 2024-06-28 PROCEDURE — 99284 EMERGENCY DEPT VISIT MOD MDM: CPT | Mod: 25

## 2024-06-28 PROCEDURE — 84484 ASSAY OF TROPONIN QUANT: CPT | Performed by: STUDENT IN AN ORGANIZED HEALTH CARE EDUCATION/TRAINING PROGRAM

## 2024-06-28 PROCEDURE — 85025 COMPLETE CBC W/AUTO DIFF WBC: CPT | Performed by: STUDENT IN AN ORGANIZED HEALTH CARE EDUCATION/TRAINING PROGRAM

## 2024-06-28 PROCEDURE — 25000003 PHARM REV CODE 250: Performed by: STUDENT IN AN ORGANIZED HEALTH CARE EDUCATION/TRAINING PROGRAM

## 2024-06-28 PROCEDURE — 80053 COMPREHEN METABOLIC PANEL: CPT | Performed by: STUDENT IN AN ORGANIZED HEALTH CARE EDUCATION/TRAINING PROGRAM

## 2024-06-28 PROCEDURE — 36415 COLL VENOUS BLD VENIPUNCTURE: CPT | Performed by: STUDENT IN AN ORGANIZED HEALTH CARE EDUCATION/TRAINING PROGRAM

## 2024-06-28 PROCEDURE — 93010 ELECTROCARDIOGRAM REPORT: CPT | Mod: ,,, | Performed by: INTERNAL MEDICINE

## 2024-06-28 RX ORDER — LOSARTAN POTASSIUM 25 MG/1
100 TABLET ORAL DAILY
Status: DISCONTINUED | OUTPATIENT
Start: 2024-06-28 | End: 2024-06-28 | Stop reason: HOSPADM

## 2024-06-28 RX ORDER — AMLODIPINE BESYLATE 5 MG/1
5 TABLET ORAL
Status: COMPLETED | OUTPATIENT
Start: 2024-06-28 | End: 2024-06-28

## 2024-06-28 RX ORDER — HYDROCHLOROTHIAZIDE 12.5 MG/1
12.5 TABLET ORAL
Status: COMPLETED | OUTPATIENT
Start: 2024-06-28 | End: 2024-06-28

## 2024-06-28 RX ADMIN — HYDROCHLOROTHIAZIDE 12.5 MG: 12.5 TABLET ORAL at 10:06

## 2024-06-28 RX ADMIN — AMLODIPINE BESYLATE 5 MG: 5 TABLET ORAL at 09:06

## 2024-06-28 RX ADMIN — LOSARTAN POTASSIUM 100 MG: 25 TABLET, FILM COATED ORAL at 09:06

## 2024-06-28 NOTE — ED PROVIDER NOTES
Encounter Date: 6/28/2024       History     Chief Complaint   Patient presents with    Hypotension    Tachycardia     States she woke up and her blood pressure was low and her heart rate was high, states she did not take any of her meds this am     HPI    79-year-old female with a past medical history of CAD with stents, hypertension, type 2 diabetes who presents emergency department for elevated heart rate low blood pressure this morning.  Patient states he felt funny when she woke up this morning and took her blood pressure.  She noted her heart rates when the 120s range in her blood pressure was low in the 80s or 90s systolic.  She had a new blood pressure cuff so went to her son's house to check her blood pressure to make sure that it was still accurate.  She states it was so presents emergency department.  She states that when she got here she felt better.  She did not take her blood pressure medication this morning because of the low blood pressure.  Denies any abdominal pain nausea vomiting or diarrhea.  States she feels fine other than the weakness she had when her blood pressure was low.    Review of patient's allergies indicates:  No Known Allergies  Past Medical History:   Diagnosis Date    Coronary artery disease     Diabetes mellitus     HTN (hypertension) 03/16/2024    Hypercholesteremia     Hypercholesterolemia 03/16/2024    Hypertension     Multiple pelvic fractures 03/16/2024    Type 2 diabetes mellitus 03/16/2024     Past Surgical History:   Procedure Laterality Date    BLADDER SURGERY      cardiac stents      EXCISIONAL HEMORRHOIDECTOMY      INTRAMEDULLARY RODDING OF FEMUR Left 6/5/2023    Procedure: INSERTION, INTRAMEDULLARY JAMAL, FEMUR;  Surgeon: Ace Del Cid DO;  [facility]: [facility];  Service: Orthopedics;  Laterality: Left;  supine hana table synthes c arm    SHOULDER SURGERY       Family History   Family history unknown: Yes     Social History     Tobacco Use    Smoking status: Never     Smokeless tobacco: Never   Substance Use Topics    Alcohol use: Not Currently    Drug use: Not Currently     Review of Systems   Constitutional:  Positive for fatigue. Negative for fever.   Respiratory:  Negative for cough.    Cardiovascular:  Positive for palpitations. Negative for chest pain.   Gastrointestinal:  Negative for abdominal pain, constipation, diarrhea, nausea and vomiting.   Neurological:  Negative for headaches.   All other systems reviewed and are negative.      Physical Exam     Initial Vitals [06/28/24 0904]   BP Pulse Resp Temp SpO2   (!) 197/106 110 16 98.4 °F (36.9 °C) 99 %      MAP       --         Physical Exam    Nursing note and vitals reviewed.  Constitutional: She appears well-developed and well-nourished. No distress.   Cardiovascular:  Regular rhythm.           Mildly tachycardic in the low 105 range   Pulmonary/Chest: Breath sounds normal. No respiratory distress. She has no wheezes. She has no rhonchi. She has no rales.   Abdominal: Abdomen is soft. There is no abdominal tenderness. There is no rebound and no guarding.   Musculoskeletal:         General: No tenderness. Normal range of motion.     Neurological: She is alert and oriented to person, place, and time. She has normal strength.   Skin: Skin is warm. Capillary refill takes less than 2 seconds.         ED Course   Procedures  Labs Reviewed   COMPREHENSIVE METABOLIC PANEL - Abnormal; Notable for the following components:       Result Value    Glucose 196 (*)     Creatinine 1.28 (*)     All other components within normal limits   CBC WITH DIFFERENTIAL - Abnormal; Notable for the following components:    MCHC 32.6 (*)     All other components within normal limits   TSH - Normal   TROPONIN I - Normal   LACTIC ACID, PLASMA - Normal   CBC W/ AUTO DIFFERENTIAL    Narrative:     The following orders were created for panel order CBC auto differential.  Procedure                               Abnormality         Status                      ---------                               -----------         ------                     CBC with Differential[[id number]]       Abnormal            Final result                 Please view results for these tests on the individual orders.   URINALYSIS, REFLEX TO URINE CULTURE     EKG Readings: (Independently Interpreted)   Initial Reading: No STEMI. Rhythm: Sinus Tachycardia. Heart Rate: 104. Ectopy: No Ectopy. Conduction: RBBB. ST Segments: Normal ST Segments. T Waves: Normal. Clinical Impression: Sinus Tachycardia with RBBB     ECG Results              EKG 12-lead (In process)        Collection Time Result Time QRS Duration OHS QTC Calculation    06/28/24 09:11:39 06/28/24 10:20:20 124 518                     In process by Interface, Lab In [facility] (06/28/24 10:20:27)                   Narrative:    Test Reason : R00.0,    Vent. Rate : 104 BPM     Atrial Rate : 104 BPM     P-R Int : 226 ms          QRS Dur : 124 ms      QT Int : 394 ms       P-R-T Axes : 052 109 -20 degrees     QTc Int : 518 ms    Sinus tachycardia with 1st degree A-V block  Right bundle branch block  T wave abnormality, consider inferior ischemia  Abnormal ECG  When compared with ECG of 30-DEC-2023 17:18,  Sinus rhythm has replaced Junctional rhythm  T wave inversion more evident in Inferior leads    Referred By: [email or web address]   SELF           Confirmed By:                                   Imaging Results    None          Medications   losartan tablet 100 mg (100 mg Oral Given 6/28/24 0929)   amLODIPine tablet 5 mg (5 mg Oral Given 6/28/24 0929)   hydroCHLOROthiazide tablet 12.5 mg (12.5 mg Oral Given 6/28/24 1022)     Medical Decision Making  Initial Assessment:   Low blood pressure/tachycardia    Differential Diagnosis:   Judging by the patient's chief complaint and pertinent history, the patient has the following possible differential diagnoses, including but not limited to the following.  Some of these are deemed to be lower likelihood and some  more likely based on my physical exam and history combined with possible lab work and/or imaging studies.   Please see the pertinent studies, and refer to the HPI.  Some of these diagnoses will take further evaluation to fully rule out, perhaps as an outpatient and the patient was encouraged to follow up when discharged for more comprehensive evaluation.  Dysrhythmia, electrolyte abnormality, dehydration, fatigue, UTI, sepsis,  as well as multiple other possible etiologies      Problems Addressed:  Hypotensive episode: undiagnosed new problem with uncertain prognosis  Palpitations: undiagnosed new problem with uncertain prognosis    Amount and/or Complexity of Data Reviewed  Labs: ordered. Decision-making details documented in ED Course.  ECG/medicine tests: ordered and independent interpretation performed.    Risk  Prescription drug management.  Decision regarding hospitalization.               ED Course as of 06/28/24 1122 Fri Jun 28, 2024 0927 Patient's blood pressure is extremely accelerated.  Will give home dose blood pressure medications. [BS]   0931 WBC: 8.72 [BS]   0931 Hemoglobin: 14.8 [BS]   0931 Hematocrit: 45.4 [BS]   0931 Platelet Count: 291 [BS]   0936 WBC: 8.72 [BS]   0936 Hemoglobin: 14.8 [BS]   0936 Hematocrit: 45.4 [BS]   0936 Platelet Count: 291 [BS]   0949 Sodium: 137 [BS]   0949 Potassium: 3.8 [BS]   0949 Chloride: 102 [BS]   0949 Glucose(!): 196 [BS]   0949 BUN: 14.0 [BS]   0949 Creatinine(!): 1.28 [BS]   0949 Calcium: 10.0 [BS]   0949 Troponin I: <0.010 [BS]   1008 TSH: 1.257 [BS]   1120 Patient has been monitored for over 2 hours emergency department with a heart rate staying in the 90s.  Her blood pressure is completely stable.  I did offer them admission secondary to episode of tachycardia with hypotension.  They state they rather go home.  States that they will follow up with her cardiologist and try to get a Holter monitor.  I informed them that if it happens again she needs to come  back in for admission.  They agree.  I wants it again offered and explained the possibility of admission because of this.  They rather go home.  They understand the risks.  ER precautions given. [BS]      ED Course User Index  [BS] Juan Jose Vanegas MD                             Clinical Impression:  Final diagnoses:  [R00.0] Tachycardia  [R00.2] Palpitations (Primary)  [I95.9] Hypotensive episode - Pre-hospital          ED Disposition Condition    Discharge Stable          ED Prescriptions    None       Follow-up Information       Follow up With Specialties Details Why Contact Info    Mau Dominique III, MD Family Medicine Schedule an appointment as soon as possible for a visit   [address] [facility] [facility] [address]  [place] [address]  [phone]      [facility] - Emergency Dept Emergency Medicine Go to  If symptoms worsen [address] [facility] [id number] [phone]    Call cardiologist  Call                Juan Jose Vanegas MD  06/28/24 1122       Juan Jose Vanegas MD  06/28/24 3058

## 2024-06-29 LAB
OHS QRS DURATION: 124 MS
OHS QTC CALCULATION: 518 MS

## 2024-08-06 DIAGNOSIS — R06.02 SOB (SHORTNESS OF BREATH): Primary | ICD-10-CM

## 2024-08-08 ENCOUNTER — PROCEDURE VISIT (OUTPATIENT)
Dept: RESPIRATORY THERAPY | Facility: HOSPITAL | Age: 79
End: 2024-08-08
Attending: INTERNAL MEDICINE
Payer: MEDICARE

## 2024-08-08 DIAGNOSIS — R06.02 SOB (SHORTNESS OF BREATH): ICD-10-CM

## 2024-08-08 LAB
FEF 25 75 LLN: 0.9
FEF 25 75 PRE REF: 66.8 %
FEF 25 75 REF: 2.3
FEF2575CHANGE: 28.9 %
FEF2575POSTPRED: 86.1 %
FET100CHANGE: -31 %
FEV1 FVC LLN: 62
FEV1 FVC PRE REF: 97.2 %
FEV1 FVC REF: 77
FEV1 LLN: 1.41
FEV1 PRE REF: 99.6 %
FEV1 REF: 2.01
FEV1CHANGE: -4.1 %
FEV1FVCCHANGE: -2.7 %
FEV1FVCPOSTPRED: 94.6 %
FEV1POSTPRED: 95.6 %
FVC LLN: 1.86
FVC PRE REF: 101.2 %
FVC REF: 2.65
FVCCHANGE: -1.4 %
FVCPOSTPRED: 99.7 %
PEF LLN: 3.28
PEF PRE REF: 92.4 %
PEF REF: 5.04
PEFCHANGE: -39.7 %
PEFPOSTPRED: 55.8 %
POST FEF 25 75: 1.98 L/S (ref 0.9–3.7)
POST FET 100: 8.2 SEC
POST FEV1 FVC: 72.73 % (ref 62.35–89.55)
POST FEV1: 1.92 L (ref 1.41–2.58)
POST FVC: 2.64 L (ref 1.86–3.48)
POST PEF: 2.81 L/S (ref 3.28–6.81)
PRE FEF 25 75: 1.53 L/S (ref 0.9–3.7)
PRE FET 100: 11.88 SEC
PRE FEV1 FVC: 74.75 % (ref 62.35–89.55)
PRE FEV1: 2 L (ref 1.41–2.58)
PRE FVC: 2.68 L (ref 1.86–3.48)
PRE PEF: 4.66 L/S (ref 3.28–6.81)

## 2024-08-08 PROCEDURE — 94010 BREATHING CAPACITY TEST: CPT

## 2024-08-08 PROCEDURE — 94640 AIRWAY INHALATION TREATMENT: CPT | Mod: XB

## 2024-08-08 PROCEDURE — 94727 GAS DIL/WSHOT DETER LNG VOL: CPT

## 2024-08-08 PROCEDURE — 94729 DIFFUSING CAPACITY: CPT

## 2024-08-08 RX ORDER — ALBUTEROL SULFATE 0.83 MG/ML
2.5 SOLUTION RESPIRATORY (INHALATION)
Status: COMPLETED | OUTPATIENT
Start: 2024-08-08 | End: 2024-08-08

## 2024-08-08 RX ADMIN — ALBUTEROL SULFATE 2.5 MG: 0.83 SOLUTION RESPIRATORY (INHALATION) at 01:08

## 2024-08-12 ENCOUNTER — LAB VISIT (OUTPATIENT)
Dept: LAB | Facility: HOSPITAL | Age: 79
End: 2024-08-12
Attending: INTERNAL MEDICINE
Payer: MEDICARE

## 2024-08-12 DIAGNOSIS — I25.10 CORONARY ARTERY DISEASE, UNSPECIFIED VESSEL OR LESION TYPE, UNSPECIFIED WHETHER ANGINA PRESENT, UNSPECIFIED WHETHER NATIVE OR TRANSPLANTED HEART: Primary | ICD-10-CM

## 2024-08-12 DIAGNOSIS — I10 ELEVATED BLOOD PRESSURE READING IN OFFICE WITH WHITE COAT SYNDROME, WITH DIAGNOSIS OF HYPERTENSION: ICD-10-CM

## 2024-08-12 DIAGNOSIS — I10 HYPERTENSION, UNSPECIFIED TYPE: ICD-10-CM

## 2024-08-12 LAB
BASOPHILS # BLD AUTO: 0.04 X10(3)/MCL (ref 0.01–0.08)
BASOPHILS NFR BLD AUTO: 0.5 % (ref 0.1–1.2)
EOSINOPHIL # BLD AUTO: 0.21 X10(3)/MCL (ref 0.04–0.36)
EOSINOPHIL NFR BLD AUTO: 2.8 % (ref 0.7–7)
ERYTHROCYTE [DISTWIDTH] IN BLOOD BY AUTOMATED COUNT: 12.8 % (ref 11–14.5)
HCT VFR BLD AUTO: 36.8 % (ref 36–48)
HGB BLD-MCNC: 12.4 G/DL (ref 11.8–16)
IMM GRANULOCYTES # BLD AUTO: 0.03 X10(3)/MCL (ref 0–0.03)
IMM GRANULOCYTES NFR BLD AUTO: 0.4 % (ref 0–0.5)
LYMPHOCYTES # BLD AUTO: 3.13 X10(3)/MCL (ref 1.16–3.74)
LYMPHOCYTES NFR BLD AUTO: 41.2 % (ref 20–55)
MCH RBC QN AUTO: 30 PG (ref 27–34)
MCHC RBC AUTO-ENTMCNC: 33.7 G/DL (ref 31–37)
MCV RBC AUTO: 89.1 FL (ref 79–99)
MONOCYTES # BLD AUTO: 0.74 X10(3)/MCL (ref 0.24–0.36)
MONOCYTES NFR BLD AUTO: 9.7 % (ref 4.7–12.5)
NEUTROPHILS # BLD AUTO: 3.45 X10(3)/MCL (ref 1.56–6.13)
NEUTROPHILS NFR BLD AUTO: 45.4 % (ref 37–73)
NRBC BLD AUTO-RTO: 0 %
PLATELET # BLD AUTO: 322 X10(3)/MCL (ref 140–371)
PMV BLD AUTO: 9 FL (ref 9.4–12.4)
RBC # BLD AUTO: 4.13 X10(6)/MCL (ref 4–5.1)
WBC # BLD AUTO: 7.6 X10(3)/MCL (ref 4–11.5)

## 2024-08-12 PROCEDURE — 85025 COMPLETE CBC W/AUTO DIFF WBC: CPT

## 2024-08-12 PROCEDURE — 36415 COLL VENOUS BLD VENIPUNCTURE: CPT

## 2024-08-29 NOTE — DISCHARGE SUMMARY
[facility] MEDICINE   DISCHARGE SUMMARY    Patient Name: Vickie Rahman  MRN: [id number]  Admission Date: 6/4/2023  Hospital Length of Stay: 3 days  Discharge Date and Time: 06/08/2023  Discharge Provider: Nash Carrillo  Primary Care Provider: Mau Dominique Iii, MD      HOSPITAL COURSE   78-year-old female with significant history of HTN, HLD, chronic kidney disease stage IIIA, CAD status post PCI, type 2 diabetes mellitus, hypothyroidism presented to the ED complaining of left hip pain after sustaining a ground level fall.  Patient tripped and fell while walking up the stairs in her house landing on her left hip on cement.  At baseline patient is able to ambulate independently and is independent of all activities of daily living paid patient was afebrile, hemodynamically stable in the ED.  Imaging revealed left proximal femoral fracture.  Orthopedics consulted and they planned to take her to OR on 6/5.  Patient was taken to OR on 06/05 and she underwent intramedullary nailing.  Postop recommendations per Orthopedics, on multimodal pain control.  Closely monitoring labs.  Postop anemia noted, but more than 9 on 06/06.  Eventually did require 2 units of blood transfusion June 7th.  At this time patient is medically stable for discharge to [facility], she has been accepted plan for discharge today.        PHYSICAL EXAM     Most Recent Vital Signs:  Temp: 98.3 °F (36.8 °C) (06/08/23 1039)  Pulse: 90 (06/08/23 1039)  Resp: 20 (06/08/23 1155)  BP: 122/70 (06/08/23 1039)  SpO2: 98 % (06/08/23 1039)   GENERAL: In no acute distress, afebrile  HEENT:  CHEST: Clear to auscultation bilaterally  HEART: S1, S2, no appreciable murmur  ABDOMEN: Soft, nontender, BS +  MSK: Warm, no lower extremity edema, no clubbing or cyanosis  NEUROLOGIC: Alert and oriented x4, moving all extremities with good strength   INTEGUMENTARY:  PSYCHIATRY:          DISCHARGE DIAGNOSIS   Closed left  Renal Consult Note    Patient :  Tejas Machado; 60 y.o. MRN# [id number]  Location:  [facility] 04/04-1  Attending:  Pito Beltran MD  Admit Date:  8/28/2024   Hospital Day: 0    Reason for Consult:     Asked by Pito Butts MD to see for GUZMAN/Elevated Creatinine.    History Obtained From:     Patient, electronic medical record    History of Present Illness:     Tejas Machado; 60 y.o. male with past medical history of CKD 3b-4 likely due to nephrosclerosis baseline creatinine of 2.1-2.6 mg/dl follows up with Dianna Beyer from [facility], hypertension, tree of A-fib, nonischemic cardiomyopathy with low EF, history of Little's esophagus with esophageal stricture, history of alcohol abuse presented to the hospital with the chief complaint of cough, nausea and vomiting since last 3 days.  Patient mentioned that he has been feeling overall fatigued and tired and had some shortness of breath along with some diarrhea as well.  Patient was found to have COVID-19 infection.  Patient also had a drop in hemoglobin with a value of 6.4.  BMP results showed sodium 138, potassium 4.0, chloride 104, bicarb 21, calcium 8.4, BUN 24, creatinine 2.6 mg/DL.  Chest x-ray 8/28/2024 showed stable enlargement of cardiac silhouette without pulmonary process. Home medications include Aldactone 50 mg daily, losartan 100 mg daily.  Nephrology is consulted due to underlying renal dysfunction.    No history of recent contrast exposure, No h/o prolonged NSAIDs use in the past, No h/o nephrolithiasis, No recent skin rashes or arthralgias, No hematuria or pyuria noticed in the recent past. Doesn't report any reduction in the urine output recently. Non report of any obstructive urinary symptoms (urgency, frequency, weak stream, straining while urination). No h/o recurrent UTIs in the past.    Past History/Allergies?Social History:     Past Medical History:   Diagnosis Date    Atrial fibrillation (HCC)     Benign  intertrochanteric femur fracture-status post intramedullary nailing June 5   Mechanical fall   Postoperative anemia requiring blood transfusion   Mild acute kidney injury-resolved     History of:  CAD PCI stent, HTN, HLD, dm 2, CKD 3 a    _____________________________________________________________________________      DISCHARGE MED REC     Current Discharge Medication List        START taking these medications    Details   methocarbamoL (ROBAXIN) 500 MG Tab Take 1 tablet (500 mg total) by mouth 3 (three) times daily. for 10 days  Qty: 30 tablet, Refills: 0      rivaroxaban (XARELTO) 10 mg Tab Take 1 tablet (10 mg total) by mouth daily with dinner or evening meal. For dvt prevention post surgery           CONTINUE these medications which have CHANGED    Details   amitriptyline (ELAVIL) 25 MG tablet Take 1 tablet (25 mg total) by mouth every evening.      isosorbide mononitrate (IMDUR) 30 MG 24 hr tablet Take 1 tablet (30 mg total) by mouth once daily.      metoprolol succinate (TOPROL-XL) 25 MG 24 hr tablet Take 1 tablet (25 mg total) by mouth once daily.    Comments: .      pravastatin (PRAVACHOL) 40 MG tablet Take 1 tablet (40 mg total) by mouth once daily.           CONTINUE these medications which have NOT CHANGED    Details   glimepiride (AMARYL) 2 MG tablet Take 2 mg by mouth 2 (two) times daily.      losartan-hydrochlorothiazide 100-12.5 mg (HYZAAR) 100-12.5 mg Tab Take 1 tablet by mouth.      metFORMIN (GLUCOPHAGE-XR) 500 MG ER 24hr tablet       pioglitazone (ACTOS) 15 MG tablet       SYNTHROID 88 mcg tablet Take 88 mcg by mouth every morning.                CONSULTS     Consults (From admission, onward)          Status Ordering Provider     Inpatient consult to Social Work/Case Management  Once        Provider:  (Not yet assigned)    Completed DANELLE PASCUAL     Inpatient consult to Social Work/Case Management  Once        Provider:  (Not yet assigned)    ANTONIO Walton     Inpatient  consult to Orthopedic Surgery  Once        Provider:  Jonathan Vogt MD    Completed JOELLE MAHONEY              FOLLOW UP      Follow-up Information       Ace Del Cid, DO Follow up in 3 week(s).    Specialty: Orthopedic Surgery  Contact information:  [address] [place] [address]  [address]  [place] [address]  [phone]                                 DISCHARGE INSTRUCTIONS     Explained in detail to the patient about the discharge plan, medications, and follow-up visits. Pt understands and agrees with the treatment plan.  Discharged Condition: stable  Diet as tolerated  Activities as tolerated  Discharge to:  [facility]     TIME SPENT ON DISCHARGE   35 minutes        Nash Carrillo MD  Internal Medicine  Department of [facility] Medicine  [facility]      This document was created using electronic dictation services.  Please excuse any errors that may have been made.  Contact me if any questions regarding documentation to clarify verbiage.

## 2024-12-21 NOTE — PLAN OF CARE
Problem: Physical Therapy  Goal: Physical Therapy Goal  Description: Goals to be met by: discharge     Patient will increase functional independence with mobility by performin. Supine to sit with Stand-by Assistance  2. Sit to stand transfer with Stand-by Assistance  3. Gait  x 75 feet with Stand-by Assistance using Rolling Walker.     Outcome: Ongoing, Progressing      [Negative] : Heme/Lymph

## 2025-04-29 ENCOUNTER — HOSPITAL ENCOUNTER (EMERGENCY)
Facility: HOSPITAL | Age: 80
Discharge: HOME | End: 2025-04-29
Payer: COMMERCIAL

## 2025-04-29 VITALS
DIASTOLIC BLOOD PRESSURE: 79 MMHG | SYSTOLIC BLOOD PRESSURE: 152 MMHG | HEIGHT: 65 IN | TEMPERATURE: 99 F | BODY MASS INDEX: 19.99 KG/M2 | WEIGHT: 120 LBS | OXYGEN SATURATION: 99 % | RESPIRATION RATE: 18 BRPM | HEART RATE: 99 BPM

## 2025-04-29 DIAGNOSIS — M54.50 ACUTE BILATERAL LOW BACK PAIN WITHOUT SCIATICA: ICD-10-CM
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
## 2025-04-29 PROCEDURE — 99284 EMERGENCY DEPT VISIT MOD MDM: CPT | Mod: 25
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
## 2025-06-20 ENCOUNTER — HOSPITAL ENCOUNTER (OUTPATIENT)
Dept: RADIOLOGY | Facility: HOSPITAL | Age: 80
Discharge: HOME | End: 2025-06-20
Attending: FAMILY MEDICINE
Payer: MEDICARE

## 2025-06-20 DIAGNOSIS — M54.6 PAIN IN THORACIC SPINE: ICD-10-CM

## 2025-06-20 PROCEDURE — 72128 CT CHEST SPINE W/O DYE: CPT | Mod: TC

## 2025-07-10 DIAGNOSIS — M54.2 CERVICALGIA: Primary | ICD-10-CM

## 2025-07-16 ENCOUNTER — HOSPITAL ENCOUNTER (OUTPATIENT)
Dept: RADIOLOGY | Facility: HOSPITAL | Age: 80
Discharge: HOME | End: 2025-07-16
Attending: FAMILY MEDICINE
Payer: MEDICARE

## 2025-07-16 DIAGNOSIS — M54.2 CERVICALGIA: ICD-10-CM

## 2025-07-16 PROCEDURE — 72141 MRI NECK SPINE W/O DYE: CPT | Mod: TC

## 2025-08-13 ENCOUNTER — HOSPITAL ENCOUNTER (OUTPATIENT)
Dept: RADIOLOGY | Facility: HOSPITAL | Age: 80
Discharge: HOME | End: 2025-08-13
Attending: FAMILY MEDICINE
Payer: MEDICARE

## 2025-08-13 DIAGNOSIS — M79.604 RIGHT LEG PAIN: ICD-10-CM

## 2025-08-13 DIAGNOSIS — R06.02 SHORTNESS OF BREATH: ICD-10-CM

## 2025-08-13 DIAGNOSIS — M79.605 LEFT LEG PAIN: ICD-10-CM

## 2025-08-13 PROCEDURE — 93970 EXTREMITY STUDY: CPT | Mod: TC

## 2025-08-13 PROCEDURE — 71046 X-RAY EXAM CHEST 2 VIEWS: CPT | Mod: TC

## 2025-08-15 ENCOUNTER — LAB VISIT (OUTPATIENT)
Dept: LAB | Facility: HOSPITAL | Age: 80
End: 2025-08-15
Attending: INTERNAL MEDICINE
Payer: MEDICARE

## 2025-08-15 DIAGNOSIS — R06.09 DYSPNEA ON EXERTION: ICD-10-CM

## 2025-08-15 DIAGNOSIS — I25.10 CORONARY ATHEROSCLEROSIS OF NATIVE CORONARY ARTERY: Primary | ICD-10-CM

## 2025-08-15 LAB
ANION GAP SERPL CALC-SCNC: 11 MEQ/L
BUN SERPL-MCNC: 21 MG/DL (ref 9.8–20.1)
CALCIUM SERPL-MCNC: 9.4 MG/DL (ref 8.4–10.2)
CHLORIDE SERPL-SCNC: 108 MMOL/L (ref 98–107)
CO2 SERPL-SCNC: 23 MMOL/L (ref 23–31)
CREAT SERPL-MCNC: 1.24 MG/DL (ref 0.55–1.02)
CREAT/UREA NIT SERPL: 17
GFR SERPLBLD CREATININE-BSD FMLA CKD-EPI: 44 ML/MIN/1.73/M2
GLUCOSE SERPL-MCNC: 148 MG/DL (ref 82–115)
POTASSIUM SERPL-SCNC: 4 MMOL/L (ref 3.5–5.1)
SODIUM SERPL-SCNC: 142 MMOL/L (ref 136–145)

## 2025-08-15 PROCEDURE — 36415 COLL VENOUS BLD VENIPUNCTURE: CPT

## 2025-08-15 PROCEDURE — 80048 BASIC METABOLIC PNL TOTAL CA: CPT

## (undated) DEVICE — COVER TABLE HVY DTY 60X90IN

## (undated) DEVICE — BIT DRILL 4.2MM 3 FLUTD 145MM

## (undated) DEVICE — SUT VICRYL BR 1 GEN 27 CT-1

## (undated) DEVICE — STAPLER SKIN PROXIMATE WIDE

## (undated) DEVICE — COVER FULLGUARD SHOE HIGH-TOP

## (undated) DEVICE — GLOVE PROTEXIS BLUE LATEX 9

## (undated) DEVICE — GAUZE SPONGE 4X4 12PLY

## (undated) DEVICE — REAMING ROD

## (undated) DEVICE — WIRE GUIDE 3.2MM 400MM
Type: IMPLANTABLE DEVICE | Site: HIP | Status: NON-FUNCTIONAL
Removed: 2023-06-05

## (undated) DEVICE — GLOVE PROTEXIS HYDROGEL SZ9

## (undated) DEVICE — DRESSING ANTIMIC ISLAND 4X10IN

## (undated) DEVICE — Device

## (undated) DEVICE — DRAPE C-ARMOR EQUIPMENT COVER

## (undated) DEVICE — DRESSING XEROFORM 5X9IN

## (undated) DEVICE — DRESSING TELFA + BARR 4X6IN

## (undated) DEVICE — GOWN SMARTGOWN 3XL XLONG

## (undated) DEVICE — BLADE SURG CARBON STEEL #10

## (undated) DEVICE — TAPE SILK 3IN

## (undated) DEVICE — APPLICATOR CHLORAPREP ORN 26ML

## (undated) DEVICE — DRAPE IOBAN 2 STERI

## (undated) DEVICE — DRAPE C-ARM COVER EZ 36X28IN

## (undated) DEVICE — GLOVE PROTEXIS HYDROGEL SZ6

## (undated) DEVICE — DRESSING TEGADERM 2 3/8 X 2.75

## (undated) DEVICE — GLOVE PROTEXIS NEU-THERA SZ6

## (undated) DEVICE — ELECTRODE REM POLYHESIVE II